# Patient Record
Sex: FEMALE | Race: WHITE | Employment: OTHER | URBAN - METROPOLITAN AREA
[De-identification: names, ages, dates, MRNs, and addresses within clinical notes are randomized per-mention and may not be internally consistent; named-entity substitution may affect disease eponyms.]

---

## 2018-10-04 ENCOUNTER — OFFICE VISIT (OUTPATIENT)
Dept: ORTHOPEDIC SURGERY | Age: 73
End: 2018-10-04
Payer: MEDICARE

## 2018-10-04 VITALS
SYSTOLIC BLOOD PRESSURE: 138 MMHG | HEART RATE: 88 BPM | WEIGHT: 118 LBS | HEIGHT: 61 IN | DIASTOLIC BLOOD PRESSURE: 80 MMHG | BODY MASS INDEX: 22.28 KG/M2

## 2018-10-04 DIAGNOSIS — M25.561 ACUTE PAIN OF RIGHT KNEE: Primary | ICD-10-CM

## 2018-10-04 DIAGNOSIS — M16.11 ARTHRITIS OF RIGHT HIP: ICD-10-CM

## 2018-10-04 PROCEDURE — 1123F ACP DISCUSS/DSCN MKR DOCD: CPT | Performed by: ORTHOPAEDIC SURGERY

## 2018-10-04 PROCEDURE — G8427 DOCREV CUR MEDS BY ELIG CLIN: HCPCS | Performed by: ORTHOPAEDIC SURGERY

## 2018-10-04 PROCEDURE — 4040F PNEUMOC VAC/ADMIN/RCVD: CPT | Performed by: ORTHOPAEDIC SURGERY

## 2018-10-04 PROCEDURE — 3017F COLORECTAL CA SCREEN DOC REV: CPT | Performed by: ORTHOPAEDIC SURGERY

## 2018-10-04 PROCEDURE — 1090F PRES/ABSN URINE INCON ASSESS: CPT | Performed by: ORTHOPAEDIC SURGERY

## 2018-10-04 PROCEDURE — 1036F TOBACCO NON-USER: CPT | Performed by: ORTHOPAEDIC SURGERY

## 2018-10-04 PROCEDURE — G8400 PT W/DXA NO RESULTS DOC: HCPCS | Performed by: ORTHOPAEDIC SURGERY

## 2018-10-04 PROCEDURE — G8420 CALC BMI NORM PARAMETERS: HCPCS | Performed by: ORTHOPAEDIC SURGERY

## 2018-10-04 PROCEDURE — G8484 FLU IMMUNIZE NO ADMIN: HCPCS | Performed by: ORTHOPAEDIC SURGERY

## 2018-10-04 PROCEDURE — 1101F PT FALLS ASSESS-DOCD LE1/YR: CPT | Performed by: ORTHOPAEDIC SURGERY

## 2018-10-04 PROCEDURE — 99203 OFFICE O/P NEW LOW 30 MIN: CPT | Performed by: ORTHOPAEDIC SURGERY

## 2018-10-04 RX ORDER — IBUPROFEN 200 MG
200 TABLET ORAL
COMMUNITY
End: 2018-12-19 | Stop reason: ALTCHOICE

## 2018-10-04 RX ORDER — SIMVASTATIN 20 MG
20 TABLET ORAL NIGHTLY
COMMUNITY
Start: 2018-07-03

## 2018-10-04 RX ORDER — LORATADINE 10 MG/1
10 TABLET ORAL DAILY
Status: ON HOLD | COMMUNITY
End: 2019-01-07

## 2018-10-04 NOTE — PROGRESS NOTES
with walking right shoulder and pelvis or lower limb left    Palpation:  No pain    Gait: Characterized by an antalgic gait with decreased stride length and decreased pelvic rotation with gait    Range of Motion:  diminished range with pain, range of motion of left hip is normal, range of motion right and left knees 0-135 without pain    Kena Test: diminished range with pain    Hip Provacative Maneuvers: Painful with pain felt in the proximal thigh and groin    Allis Test: Unremarkable    Strength:  Normal     Special Tests:  Negative    Neurologic Exam: Normal with intact Sensory and Motor Function     Vascular Exam: Normal with no Venous Stasis, and Intact Pulses Distally      Skin: There are no rashes, ulcerations or lesions. Additional Examinations:         Left Lower Extremity: Examination of the left lower extremity does not show any tenderness, deformity or injury. Range of motion is unremarkable. There is no gross instability. There are no rashes, ulcerations or lesions. Strength and tone are normal.    Radiology:     X-rays obtained and reviewed in office:  Views and AP pelvis x-ray taken elsewhere shows a bone-on-bone right hip anatomy looks fairly normal, x-rays of the knee consisted of a an AP and lateral skive the show no significant arthritic changes on the right knee           Impression: Arthritis right hip otherwise healthy patient, patient had cortisone injection 9/18/2018 it was explained to the patient that surgery will not be performed for at least 3 months after cortisone injection because of an increased risk of infection with subsequent surgery by close to the injection. No diagnosis found. Office Procedures:  No orders of the defined types were placed in this encounter.       Treatment Plan:  We'll plan on a right total hip replacement in January 2019 potassium the patient to come back to see us from 1 December 2018 at that time further plans can be made for getting surgery

## 2018-12-05 ENCOUNTER — OFFICE VISIT (OUTPATIENT)
Dept: ORTHOPEDIC SURGERY | Age: 73
End: 2018-12-05
Payer: MEDICARE

## 2018-12-05 VITALS — HEIGHT: 61 IN | WEIGHT: 117.95 LBS | BODY MASS INDEX: 22.27 KG/M2

## 2018-12-05 DIAGNOSIS — M16.11 ARTHRITIS OF RIGHT HIP: Primary | ICD-10-CM

## 2018-12-05 PROCEDURE — 1090F PRES/ABSN URINE INCON ASSESS: CPT | Performed by: ORTHOPAEDIC SURGERY

## 2018-12-05 PROCEDURE — 99214 OFFICE O/P EST MOD 30 MIN: CPT | Performed by: ORTHOPAEDIC SURGERY

## 2018-12-05 PROCEDURE — 3017F COLORECTAL CA SCREEN DOC REV: CPT | Performed by: ORTHOPAEDIC SURGERY

## 2018-12-05 PROCEDURE — G8400 PT W/DXA NO RESULTS DOC: HCPCS | Performed by: ORTHOPAEDIC SURGERY

## 2018-12-05 PROCEDURE — G8427 DOCREV CUR MEDS BY ELIG CLIN: HCPCS | Performed by: ORTHOPAEDIC SURGERY

## 2018-12-05 PROCEDURE — 1101F PT FALLS ASSESS-DOCD LE1/YR: CPT | Performed by: ORTHOPAEDIC SURGERY

## 2018-12-05 PROCEDURE — 1036F TOBACCO NON-USER: CPT | Performed by: ORTHOPAEDIC SURGERY

## 2018-12-05 PROCEDURE — 1123F ACP DISCUSS/DSCN MKR DOCD: CPT | Performed by: ORTHOPAEDIC SURGERY

## 2018-12-05 PROCEDURE — G8484 FLU IMMUNIZE NO ADMIN: HCPCS | Performed by: ORTHOPAEDIC SURGERY

## 2018-12-05 PROCEDURE — 4040F PNEUMOC VAC/ADMIN/RCVD: CPT | Performed by: ORTHOPAEDIC SURGERY

## 2018-12-05 PROCEDURE — G8420 CALC BMI NORM PARAMETERS: HCPCS | Performed by: ORTHOPAEDIC SURGERY

## 2018-12-05 NOTE — LETTER
[facility]  Surgery Precert & Billing Form:    DEMOGRAPHICS:                                                                                                       Patient Name:  Jessi Tarango  Patient :     Patient SS#:      Patient Phone:  [phone] (home)  Alt.  Patient Phone:    Patient Address:  [address] [place] [id number]    PCP:  [address]: Medicare    DIAGNOSIS & PROCEDURE:                                                                                      Diagnosis: Arthritis of right hip m16.11  Operation: right    SURGERY  INFORMATION  Date of Surgery:   2018  Location:  [address] [place] [address]   Type:    Inpatient  23 hour hold:  No  Surgeon:            Femi Rg MD.       18     BILLING INFORMATION:                                                                                                Physician Procedure                                            CPT Codes                      PA, or Fellow Procedure                                      CPT Codes                      Precert information:

## 2018-12-05 NOTE — LETTER
December 5, 2018      Batool Jacobs,    Breast cancer is the second most common cancer among women in the [place] and results in nearly 40,000 women deaths each year. Early detection, including mammograms, is the key to survival and [facility] would like to remind you to put your health first.     Did you know that breast cancer can hide early on, so by the time you feel a lump during a self-exam, it can be harder to treat? The best thing you can do is get a screening mammogram every 24 months. We are contacting you because our records show that you may need a mammogram.    Most women say the procedure is much easier than they expected, and are glad to have gotten it done. Mammograms are covered by most health insurance programs with little or no cost to you. To schedule an appointment for a mammogram, please click [email or web address] or call your primary care doctors office. If you have already had your mammogram in the past 24 months, great job! Please click [email or web address] to send a [employer] message or call your primary care physicians office with when (month and year) and where your mammogram was completed. This will allow us to obtain a copy of your results and update your medical records. Thanks! Please click <a href=\" [email or web address]">here</a> and contact your doctors office to set up an appointment to discuss your options.       Sincerely,      Your Healthcare Team

## 2018-12-13 DIAGNOSIS — Z01.818 PRE-OP EXAM: Primary | ICD-10-CM

## 2018-12-19 ENCOUNTER — HOSPITAL ENCOUNTER (OUTPATIENT)
Dept: PREADMISSION TESTING | Age: 73
Discharge: HOME | End: 2018-12-23
Payer: MEDICARE

## 2018-12-19 ENCOUNTER — HOSPITAL ENCOUNTER (OUTPATIENT)
Dept: GENERAL RADIOLOGY | Age: 73
Discharge: HOME | End: 2018-12-19
Payer: MEDICARE

## 2018-12-19 VITALS
DIASTOLIC BLOOD PRESSURE: 80 MMHG | HEART RATE: 74 BPM | RESPIRATION RATE: 16 BRPM | TEMPERATURE: 97 F | OXYGEN SATURATION: 100 % | SYSTOLIC BLOOD PRESSURE: 139 MMHG | BODY MASS INDEX: 22.28 KG/M2 | HEIGHT: 61 IN | WEIGHT: 118 LBS

## 2018-12-19 DIAGNOSIS — Z01.818 PREOP TESTING: ICD-10-CM

## 2018-12-19 DIAGNOSIS — Z01.818 PRE-OP EXAM: ICD-10-CM

## 2018-12-19 LAB
BILIRUBIN URINE: NEGATIVE
BLOOD, URINE: NEGATIVE
CLARITY: CLEAR
COLOR: YELLOW
GLUCOSE URINE: NEGATIVE MG/DL
KETONES, URINE: NEGATIVE MG/DL
LEUKOCYTE ESTERASE, URINE: NEGATIVE
MICROSCOPIC EXAMINATION: NORMAL
NITRITE, URINE: NEGATIVE
PH UA: 6.5
PROTEIN UA: NEGATIVE MG/DL
SPECIFIC GRAVITY UA: <=1.005
URINE TYPE: NORMAL
UROBILINOGEN, URINE: 0.2 E.U./DL

## 2018-12-19 PROCEDURE — 87086 URINE CULTURE/COLONY COUNT: CPT

## 2018-12-19 PROCEDURE — 81003 URINALYSIS AUTO W/O SCOPE: CPT

## 2018-12-19 PROCEDURE — 87641 MR-STAPH DNA AMP PROBE: CPT

## 2018-12-19 PROCEDURE — 71046 X-RAY EXAM CHEST 2 VIEWS: CPT

## 2018-12-19 RX ORDER — CALCIUM CARBONATE 500(1250)
500 TABLET ORAL DAILY
COMMUNITY
End: 2020-01-09

## 2018-12-19 RX ORDER — ASCORBIC ACID 500 MG
1000 TABLET ORAL DAILY
COMMUNITY

## 2018-12-19 RX ORDER — ACETAMINOPHEN 500 MG
500 TABLET ORAL EVERY 6 HOURS PRN
Status: ON HOLD | COMMUNITY
End: 2019-01-07

## 2018-12-19 RX ORDER — CHOLECALCIFEROL (VITAMIN D3) 1250 MCG
CAPSULE ORAL SEE ADMIN INSTRUCTIONS
Status: ON HOLD | COMMUNITY
End: 2019-01-07

## 2018-12-19 NOTE — PROGRESS NOTES
[address] [facility]                          Date of Procedure 1/7  Time of Procedure 1130    PRIOR TO PROCEDURE DATE:  1. Please follow any guidelines/instructions prior to your procedure as advised by your surgeon. 2. Arrange for someone to drive you home and be with you for the first 24 hours after discharge for your safety after your procedure for which you received sedation. Ensure it is someone we can share information with regarding your discharge. 3. You must contact your surgeon for instructions IF:   You are taking any blood thinners, aspirin, anti-inflammatory or vitamin E.   There is a change in your physical condition such as a cold, fever, rash, cuts, sores or any other infection, especially near your surgical site. 4. Do not drink alcohol the day before or day of your procedure. 5. A Pre-op History and Physical for surgery MUST be completed by your Physician or Urgent Care within 30 days of your procedure date. Please bring a copy with you on the day of your procedure and along with any other testing performed. THE DAY OF YOUR PROCEDURE:  1. Follow instructions for ARRIVAL TIME as DIRECTED BY YOUR SURGEON. If your surgeon does not give you a specific arrival time, please arrive at  [place] [address] . 2. Enter the MAIN entrance from [address] and follow the signs to the free [employer] or [employer] parking (offered free of charge 6am-5pm). 3. Enter the Main Entrance of the hospital (do NOT enter from the lower level of the parking garage). Upon entrance, check in with the  at the main desk on your left. If no one is available at the desk, proceed into the [facility] Waiting Room and go through the door directly into the [facility]. There is a Check-in desk ACROSS from Room 5 (marked with a sign hanging from the ceiling).  The phone number for the surgery center is [phone].    4. DO NOT EAT ANYTHING eight hours prior effects. 2. For comfort and safety, arrange to have someone at home with you for the first 24 hours after discharge. 3. You and your family will be given written instructions about your diet, activity, dressing care, medications, and return visits. 4. Once at home, should issues with nausea, pain, or bleeding occur, or should you notice any signs of infection, you should call your surgeon. 5. Always clean your hands before and after caring for your wound. Do not let your family touch your surgery site without cleaning their hands. 6. Narcotic pain medications can cause significant constipation. You may want to add a stool softener to your postoperative medication schedule or speak to your surgeon on how best to manage this SIDE EFFECT. SPECIAL INSTRUCTIONS     Thank you for allowing us to care for you. We strive to exceed your expectations in the overall delivery of care and service provided to you and your family. If you need to contact us for any reason, please call us at [phone]. Instructions reviewed and copy given to patient during preadmission testing visit. Pancho Galvan. 12/19/2018 .1:14 PM      ADDITIONAL EDUCATIONAL INFORMATION REVIEWED / PROVIDED TO YOU AND YOUR FAMILY:  Yes Taking Control of Your Pain   Yes FAQs about Surgical Site Infections        Yes Edilberto® Wipes Bathing Instructions (Obtained from:  [email or web address] pdf )  Yes Hibiclens® Bathing Instructions    Yes Incentive Spirometer given to patient- PLEASE BRING THIS SPIROMETER BACK WITH YOU ON THE DAY OF YOUR SURGERY    Yes CMS Comprehensive Care for Joint Replacement Model Notification Letter  Yes Your Guide to Hip Replacement Surgery. PLEASE BRING THIS BOOKLET BACK ON THE DAY OF YOUR SURGERY.     Yes  Reviewed/Given handout for TJ Video/Class

## 2018-12-20 LAB
MRSA SCREEN RT-PCR: NORMAL
URINE CULTURE, ROUTINE: NORMAL

## 2018-12-28 ENCOUNTER — TELEPHONE (OUTPATIENT)
Dept: ORTHOPEDIC SURGERY | Age: 73
End: 2018-12-28

## 2018-12-31 ENCOUNTER — TELEPHONE (OUTPATIENT)
Dept: ORTHOPEDIC SURGERY | Age: 73
End: 2018-12-31

## 2019-01-02 ENCOUNTER — OFFICE VISIT (OUTPATIENT)
Dept: ORTHOPEDIC SURGERY | Age: 74
End: 2019-01-02
Payer: MEDICARE

## 2019-01-02 VITALS — BODY MASS INDEX: 22.27 KG/M2 | WEIGHT: 117.95 LBS | HEIGHT: 61 IN

## 2019-01-02 DIAGNOSIS — M25.551 RIGHT HIP PAIN: Primary | ICD-10-CM

## 2019-01-02 PROCEDURE — G8420 CALC BMI NORM PARAMETERS: HCPCS | Performed by: ORTHOPAEDIC SURGERY

## 2019-01-02 PROCEDURE — 99214 OFFICE O/P EST MOD 30 MIN: CPT | Performed by: ORTHOPAEDIC SURGERY

## 2019-01-02 PROCEDURE — 4040F PNEUMOC VAC/ADMIN/RCVD: CPT | Performed by: ORTHOPAEDIC SURGERY

## 2019-01-02 PROCEDURE — 1036F TOBACCO NON-USER: CPT | Performed by: ORTHOPAEDIC SURGERY

## 2019-01-02 PROCEDURE — G8484 FLU IMMUNIZE NO ADMIN: HCPCS | Performed by: ORTHOPAEDIC SURGERY

## 2019-01-02 PROCEDURE — G8427 DOCREV CUR MEDS BY ELIG CLIN: HCPCS | Performed by: ORTHOPAEDIC SURGERY

## 2019-01-02 PROCEDURE — 1123F ACP DISCUSS/DSCN MKR DOCD: CPT | Performed by: ORTHOPAEDIC SURGERY

## 2019-01-02 PROCEDURE — G8400 PT W/DXA NO RESULTS DOC: HCPCS | Performed by: ORTHOPAEDIC SURGERY

## 2019-01-02 PROCEDURE — 3017F COLORECTAL CA SCREEN DOC REV: CPT | Performed by: ORTHOPAEDIC SURGERY

## 2019-01-02 PROCEDURE — 1101F PT FALLS ASSESS-DOCD LE1/YR: CPT | Performed by: ORTHOPAEDIC SURGERY

## 2019-01-02 PROCEDURE — 1090F PRES/ABSN URINE INCON ASSESS: CPT | Performed by: ORTHOPAEDIC SURGERY

## 2019-01-06 ENCOUNTER — ANESTHESIA EVENT (OUTPATIENT)
Dept: OPERATING ROOM | Age: 74
DRG: 470 | End: 2019-01-06
Payer: MEDICARE

## 2019-01-07 ENCOUNTER — ANESTHESIA (OUTPATIENT)
Dept: OPERATING ROOM | Age: 74
DRG: 470 | End: 2019-01-07
Payer: MEDICARE

## 2019-01-07 ENCOUNTER — APPOINTMENT (OUTPATIENT)
Dept: GENERAL RADIOLOGY | Age: 74
DRG: 470 | End: 2019-01-07
Attending: ORTHOPAEDIC SURGERY
Payer: MEDICARE

## 2019-01-07 ENCOUNTER — HOSPITAL ENCOUNTER (INPATIENT)
Age: 74
LOS: 2 days | Discharge: HOME HEALTH | DRG: 470 | End: 2019-01-09
Attending: ORTHOPAEDIC SURGERY | Admitting: ORTHOPAEDIC SURGERY
Payer: MEDICARE

## 2019-01-07 VITALS — OXYGEN SATURATION: 98 % | TEMPERATURE: 96.1 F | SYSTOLIC BLOOD PRESSURE: 142 MMHG | DIASTOLIC BLOOD PRESSURE: 65 MMHG

## 2019-01-07 DIAGNOSIS — M16.11 OSTEOARTHRITIS OF RIGHT HIP, UNSPECIFIED OSTEOARTHRITIS TYPE: Primary | ICD-10-CM

## 2019-01-07 PROCEDURE — 3600000013 HC SURGERY LEVEL 3 ADDTL 15MIN: Performed by: ORTHOPAEDIC SURGERY

## 2019-01-07 PROCEDURE — 2700000000 HC OXYGEN THERAPY PER DAY

## 2019-01-07 PROCEDURE — 7100000000 HC PACU RECOVERY - FIRST 15 MIN: Performed by: ORTHOPAEDIC SURGERY

## 2019-01-07 PROCEDURE — 2580000003 HC RX 258: Performed by: PHYSICIAN ASSISTANT

## 2019-01-07 PROCEDURE — 2720000010 HC SURG SUPPLY STERILE: Performed by: ORTHOPAEDIC SURGERY

## 2019-01-07 PROCEDURE — 3700000000 HC ANESTHESIA ATTENDED CARE: Performed by: ORTHOPAEDIC SURGERY

## 2019-01-07 PROCEDURE — 2780000010 HC IMPLANT OTHER: Performed by: ORTHOPAEDIC SURGERY

## 2019-01-07 PROCEDURE — 6360000002 HC RX W HCPCS: Performed by: ORTHOPAEDIC SURGERY

## 2019-01-07 PROCEDURE — 3600000003 HC SURGERY LEVEL 3 BASE: Performed by: ORTHOPAEDIC SURGERY

## 2019-01-07 PROCEDURE — 6370000000 HC RX 637 (ALT 250 FOR IP): Performed by: PHYSICIAN ASSISTANT

## 2019-01-07 PROCEDURE — 0SR904A REPLACEMENT OF RIGHT HIP JOINT WITH CERAMIC ON POLYETHYLENE SYNTHETIC SUBSTITUTE, UNCEMENTED, OPEN APPROACH: ICD-10-PCS | Performed by: ORTHOPAEDIC SURGERY

## 2019-01-07 PROCEDURE — 6360000002 HC RX W HCPCS: Performed by: PHYSICIAN ASSISTANT

## 2019-01-07 PROCEDURE — S0028 INJECTION, FAMOTIDINE, 20 MG: HCPCS | Performed by: ANESTHESIOLOGY

## 2019-01-07 PROCEDURE — 88311 DECALCIFY TISSUE: CPT

## 2019-01-07 PROCEDURE — 88304 TISSUE EXAM BY PATHOLOGIST: CPT

## 2019-01-07 PROCEDURE — C1776 JOINT DEVICE (IMPLANTABLE): HCPCS | Performed by: ORTHOPAEDIC SURGERY

## 2019-01-07 PROCEDURE — 3600000005 HC SURGERY LEVEL 5 BASE: Performed by: ORTHOPAEDIC SURGERY

## 2019-01-07 PROCEDURE — 3700000001 HC ADD 15 MINUTES (ANESTHESIA): Performed by: ORTHOPAEDIC SURGERY

## 2019-01-07 PROCEDURE — 2500000003 HC RX 250 WO HCPCS

## 2019-01-07 PROCEDURE — 94761 N-INVAS EAR/PLS OXIMETRY MLT: CPT

## 2019-01-07 PROCEDURE — C1713 ANCHOR/SCREW BN/BN,TIS/BN: HCPCS | Performed by: ORTHOPAEDIC SURGERY

## 2019-01-07 PROCEDURE — 2709999900 HC NON-CHARGEABLE SUPPLY: Performed by: ORTHOPAEDIC SURGERY

## 2019-01-07 PROCEDURE — 3600000015 HC SURGERY LEVEL 5 ADDTL 15MIN: Performed by: ORTHOPAEDIC SURGERY

## 2019-01-07 PROCEDURE — 6360000002 HC RX W HCPCS

## 2019-01-07 PROCEDURE — 6370000000 HC RX 637 (ALT 250 FOR IP): Performed by: ORTHOPAEDIC SURGERY

## 2019-01-07 PROCEDURE — 2580000003 HC RX 258: Performed by: ORTHOPAEDIC SURGERY

## 2019-01-07 PROCEDURE — 7100000001 HC PACU RECOVERY - ADDTL 15 MIN: Performed by: ORTHOPAEDIC SURGERY

## 2019-01-07 PROCEDURE — 2580000003 HC RX 258: Performed by: ANESTHESIOLOGY

## 2019-01-07 PROCEDURE — 6360000002 HC RX W HCPCS: Performed by: ANESTHESIOLOGY

## 2019-01-07 PROCEDURE — 73501 X-RAY EXAM HIP UNI 1 VIEW: CPT

## 2019-01-07 PROCEDURE — 1200000000 HC SEMI PRIVATE

## 2019-01-07 PROCEDURE — 2580000003 HC RX 258

## 2019-01-07 DEVICE — INSERT ACET OD48MM ID28MM X3 MOD 2 MOBILITY MDM: Type: IMPLANTABLE DEVICE | Site: HIP | Status: FUNCTIONAL

## 2019-01-07 DEVICE — SCREW BNE L25MM DIA6.5MM STD CANC HIP TI ST CANN NONLOCKING: Type: IMPLANTABLE DEVICE | Site: HIP | Status: FUNCTIONAL

## 2019-01-07 DEVICE — LINER ACET SZ E ID42MM HIP X3 CO CHROM CEMENTLESS MOD 2: Type: IMPLANTABLE DEVICE | Site: HIP | Status: FUNCTIONAL

## 2019-01-07 DEVICE — IMPLANTABLE DEVICE: Type: IMPLANTABLE DEVICE | Site: HIP | Status: FUNCTIONAL

## 2019-01-07 RX ORDER — ASPIRIN 81 MG/1
162 TABLET ORAL DAILY
Status: DISCONTINUED | OUTPATIENT
Start: 2019-01-07 | End: 2019-01-09 | Stop reason: HOSPADM

## 2019-01-07 RX ORDER — MORPHINE SULFATE 2 MG/ML
2 INJECTION, SOLUTION INTRAMUSCULAR; INTRAVENOUS
Status: DISCONTINUED | OUTPATIENT
Start: 2019-01-07 | End: 2019-01-09 | Stop reason: HOSPADM

## 2019-01-07 RX ORDER — SODIUM CHLORIDE 0.9 % (FLUSH) 0.9 %
10 SYRINGE (ML) INJECTION EVERY 12 HOURS SCHEDULED
Status: DISCONTINUED | OUTPATIENT
Start: 2019-01-07 | End: 2019-01-09 | Stop reason: HOSPADM

## 2019-01-07 RX ORDER — DEXTROSE, SODIUM CHLORIDE, SODIUM LACTATE, POTASSIUM CHLORIDE, AND CALCIUM CHLORIDE 5; .6; .31; .03; .02 G/100ML; G/100ML; G/100ML; G/100ML; G/100ML
INJECTION, SOLUTION INTRAVENOUS CONTINUOUS
Status: DISCONTINUED | OUTPATIENT
Start: 2019-01-07 | End: 2019-01-07

## 2019-01-07 RX ORDER — ONDANSETRON 2 MG/ML
INJECTION INTRAMUSCULAR; INTRAVENOUS PRN
Status: DISCONTINUED | OUTPATIENT
Start: 2019-01-07 | End: 2019-01-07 | Stop reason: SDUPTHER

## 2019-01-07 RX ORDER — DEXTROSE, SODIUM CHLORIDE, SODIUM LACTATE, POTASSIUM CHLORIDE, AND CALCIUM CHLORIDE 5; .6; .31; .03; .02 G/100ML; G/100ML; G/100ML; G/100ML; G/100ML
INJECTION, SOLUTION INTRAVENOUS CONTINUOUS
Status: DISPENSED | OUTPATIENT
Start: 2019-01-07 | End: 2019-01-08

## 2019-01-07 RX ORDER — GABAPENTIN 300 MG/1
600 CAPSULE ORAL ONCE
Status: COMPLETED | OUTPATIENT
Start: 2019-01-07 | End: 2019-01-07

## 2019-01-07 RX ORDER — METOCLOPRAMIDE HYDROCHLORIDE 5 MG/ML
10 INJECTION INTRAMUSCULAR; INTRAVENOUS ONCE
Status: COMPLETED | OUTPATIENT
Start: 2019-01-07 | End: 2019-01-07

## 2019-01-07 RX ORDER — SODIUM CHLORIDE 0.9 % (FLUSH) 0.9 %
10 SYRINGE (ML) INJECTION PRN
Status: DISCONTINUED | OUTPATIENT
Start: 2019-01-07 | End: 2019-01-09 | Stop reason: HOSPADM

## 2019-01-07 RX ORDER — EPHEDRINE SULFATE 50 MG/ML
INJECTION INTRAVENOUS PRN
Status: DISCONTINUED | OUTPATIENT
Start: 2019-01-07 | End: 2019-01-07 | Stop reason: SDUPTHER

## 2019-01-07 RX ORDER — GLYCOPYRROLATE 0.2 MG/ML
INJECTION INTRAMUSCULAR; INTRAVENOUS PRN
Status: DISCONTINUED | OUTPATIENT
Start: 2019-01-07 | End: 2019-01-07 | Stop reason: SDUPTHER

## 2019-01-07 RX ORDER — LIDOCAINE HYDROCHLORIDE 20 MG/ML
INJECTION, SOLUTION EPIDURAL; INFILTRATION; INTRACAUDAL; PERINEURAL PRN
Status: DISCONTINUED | OUTPATIENT
Start: 2019-01-07 | End: 2019-01-07 | Stop reason: SDUPTHER

## 2019-01-07 RX ORDER — SODIUM CHLORIDE 0.9 % (FLUSH) 0.9 %
10 SYRINGE (ML) INJECTION EVERY 12 HOURS SCHEDULED
Status: DISCONTINUED | OUTPATIENT
Start: 2019-01-07 | End: 2019-01-07 | Stop reason: HOSPADM

## 2019-01-07 RX ORDER — ASCORBIC ACID 500 MG
1000 TABLET ORAL DAILY
Status: DISCONTINUED | OUTPATIENT
Start: 2019-01-08 | End: 2019-01-09 | Stop reason: HOSPADM

## 2019-01-07 RX ORDER — HYDROCODONE BITARTRATE AND ACETAMINOPHEN 5; 325 MG/1; MG/1
1 TABLET ORAL EVERY 4 HOURS PRN
Status: DISCONTINUED | OUTPATIENT
Start: 2019-01-07 | End: 2019-01-09 | Stop reason: HOSPADM

## 2019-01-07 RX ORDER — SODIUM CHLORIDE 0.9 % (FLUSH) 0.9 %
10 SYRINGE (ML) INJECTION PRN
Status: DISCONTINUED | OUTPATIENT
Start: 2019-01-07 | End: 2019-01-07 | Stop reason: HOSPADM

## 2019-01-07 RX ORDER — MORPHINE SULFATE 2 MG/ML
1 INJECTION, SOLUTION INTRAMUSCULAR; INTRAVENOUS
Status: DISCONTINUED | OUTPATIENT
Start: 2019-01-07 | End: 2019-01-09 | Stop reason: HOSPADM

## 2019-01-07 RX ORDER — MIDAZOLAM HYDROCHLORIDE 1 MG/ML
INJECTION INTRAMUSCULAR; INTRAVENOUS PRN
Status: DISCONTINUED | OUTPATIENT
Start: 2019-01-07 | End: 2019-01-07 | Stop reason: SDUPTHER

## 2019-01-07 RX ORDER — CELECOXIB 100 MG/1
400 CAPSULE ORAL ONCE
Status: COMPLETED | OUTPATIENT
Start: 2019-01-07 | End: 2019-01-07

## 2019-01-07 RX ORDER — 0.9 % SODIUM CHLORIDE 0.9 %
500 INTRAVENOUS SOLUTION INTRAVENOUS ONCE
Status: COMPLETED | OUTPATIENT
Start: 2019-01-07 | End: 2019-01-07

## 2019-01-07 RX ORDER — ACETAMINOPHEN 325 MG/1
650 TABLET ORAL EVERY 4 HOURS PRN
Status: DISCONTINUED | OUTPATIENT
Start: 2019-01-07 | End: 2019-01-09 | Stop reason: HOSPADM

## 2019-01-07 RX ORDER — ONDANSETRON 2 MG/ML
4 INJECTION INTRAMUSCULAR; INTRAVENOUS EVERY 6 HOURS PRN
Status: DISCONTINUED | OUTPATIENT
Start: 2019-01-07 | End: 2019-01-09 | Stop reason: HOSPADM

## 2019-01-07 RX ORDER — SIMVASTATIN 20 MG
20 TABLET ORAL NIGHTLY
Status: DISCONTINUED | OUTPATIENT
Start: 2019-01-07 | End: 2019-01-09 | Stop reason: HOSPADM

## 2019-01-07 RX ORDER — CALCIUM CARBONATE 500(1250)
500 TABLET ORAL DAILY
Status: DISCONTINUED | OUTPATIENT
Start: 2019-01-08 | End: 2019-01-09 | Stop reason: HOSPADM

## 2019-01-07 RX ORDER — DEXAMETHASONE SODIUM PHOSPHATE 4 MG/ML
INJECTION, SOLUTION INTRA-ARTICULAR; INTRALESIONAL; INTRAMUSCULAR; INTRAVENOUS; SOFT TISSUE PRN
Status: DISCONTINUED | OUTPATIENT
Start: 2019-01-07 | End: 2019-01-07 | Stop reason: SDUPTHER

## 2019-01-07 RX ORDER — LABETALOL HYDROCHLORIDE 5 MG/ML
5 INJECTION, SOLUTION INTRAVENOUS EVERY 10 MIN PRN
Status: DISCONTINUED | OUTPATIENT
Start: 2019-01-07 | End: 2019-01-07 | Stop reason: HOSPADM

## 2019-01-07 RX ORDER — CEFAZOLIN SODIUM 2 G/50ML
2 SOLUTION INTRAVENOUS
Status: COMPLETED | OUTPATIENT
Start: 2019-01-07 | End: 2019-01-07

## 2019-01-07 RX ORDER — SODIUM CHLORIDE, SODIUM LACTATE, POTASSIUM CHLORIDE, CALCIUM CHLORIDE 600; 310; 30; 20 MG/100ML; MG/100ML; MG/100ML; MG/100ML
INJECTION, SOLUTION INTRAVENOUS CONTINUOUS
Status: DISCONTINUED | OUTPATIENT
Start: 2019-01-07 | End: 2019-01-07

## 2019-01-07 RX ORDER — ROCURONIUM BROMIDE 10 MG/ML
INJECTION, SOLUTION INTRAVENOUS PRN
Status: DISCONTINUED | OUTPATIENT
Start: 2019-01-07 | End: 2019-01-07 | Stop reason: SDUPTHER

## 2019-01-07 RX ORDER — SODIUM CHLORIDE, SODIUM LACTATE, POTASSIUM CHLORIDE, CALCIUM CHLORIDE 600; 310; 30; 20 MG/100ML; MG/100ML; MG/100ML; MG/100ML
INJECTION, SOLUTION INTRAVENOUS CONTINUOUS PRN
Status: DISCONTINUED | OUTPATIENT
Start: 2019-01-07 | End: 2019-01-07 | Stop reason: SDUPTHER

## 2019-01-07 RX ORDER — HYDRALAZINE HYDROCHLORIDE 20 MG/ML
5 INJECTION INTRAMUSCULAR; INTRAVENOUS EVERY 10 MIN PRN
Status: DISCONTINUED | OUTPATIENT
Start: 2019-01-07 | End: 2019-01-07 | Stop reason: HOSPADM

## 2019-01-07 RX ORDER — ONDANSETRON 2 MG/ML
4 INJECTION INTRAMUSCULAR; INTRAVENOUS
Status: DISCONTINUED | OUTPATIENT
Start: 2019-01-07 | End: 2019-01-07 | Stop reason: HOSPADM

## 2019-01-07 RX ORDER — LIDOCAINE HYDROCHLORIDE 10 MG/ML
1 INJECTION, SOLUTION EPIDURAL; INFILTRATION; INTRACAUDAL; PERINEURAL
Status: DISCONTINUED | OUTPATIENT
Start: 2019-01-07 | End: 2019-01-07 | Stop reason: HOSPADM

## 2019-01-07 RX ORDER — FENTANYL CITRATE 50 UG/ML
INJECTION, SOLUTION INTRAMUSCULAR; INTRAVENOUS PRN
Status: DISCONTINUED | OUTPATIENT
Start: 2019-01-07 | End: 2019-01-07 | Stop reason: SDUPTHER

## 2019-01-07 RX ORDER — DOCUSATE SODIUM 100 MG/1
100 CAPSULE, LIQUID FILLED ORAL 2 TIMES DAILY
Status: DISCONTINUED | OUTPATIENT
Start: 2019-01-07 | End: 2019-01-09 | Stop reason: HOSPADM

## 2019-01-07 RX ORDER — PROPOFOL 10 MG/ML
INJECTION, EMULSION INTRAVENOUS PRN
Status: DISCONTINUED | OUTPATIENT
Start: 2019-01-07 | End: 2019-01-07 | Stop reason: SDUPTHER

## 2019-01-07 RX ADMIN — HYDROMORPHONE HYDROCHLORIDE 0.5 MG: 1 INJECTION, SOLUTION INTRAMUSCULAR; INTRAVENOUS; SUBCUTANEOUS at 13:23

## 2019-01-07 RX ADMIN — CEFAZOLIN SODIUM 1 G: 1 INJECTION, POWDER, FOR SOLUTION INTRAMUSCULAR; INTRAVENOUS at 18:20

## 2019-01-07 RX ADMIN — SODIUM CHLORIDE, SODIUM LACTATE, POTASSIUM CHLORIDE, AND CALCIUM CHLORIDE: 600; 310; 30; 20 INJECTION, SOLUTION INTRAVENOUS at 09:50

## 2019-01-07 RX ADMIN — CELECOXIB 400 MG: 100 CAPSULE ORAL at 09:25

## 2019-01-07 RX ADMIN — ROCURONIUM BROMIDE 30 MG: 10 INJECTION, SOLUTION INTRAVENOUS at 11:51

## 2019-01-07 RX ADMIN — ROCURONIUM BROMIDE 10 MG: 10 INJECTION, SOLUTION INTRAVENOUS at 11:00

## 2019-01-07 RX ADMIN — SODIUM CHLORIDE, SODIUM LACTATE, POTASSIUM CHLORIDE, AND CALCIUM CHLORIDE: 600; 310; 30; 20 INJECTION, SOLUTION INTRAVENOUS at 10:25

## 2019-01-07 RX ADMIN — LIDOCAINE HYDROCHLORIDE 100 MG: 20 INJECTION, SOLUTION EPIDURAL; INFILTRATION; INTRACAUDAL; PERINEURAL at 10:30

## 2019-01-07 RX ADMIN — EPHEDRINE SULFATE 5 MG: 50 INJECTION INTRAVENOUS at 12:00

## 2019-01-07 RX ADMIN — FENTANYL CITRATE 50 MCG: 50 INJECTION INTRAMUSCULAR; INTRAVENOUS at 10:35

## 2019-01-07 RX ADMIN — SODIUM CHLORIDE, SODIUM LACTATE, POTASSIUM CHLORIDE, AND CALCIUM CHLORIDE: 600; 310; 30; 20 INJECTION, SOLUTION INTRAVENOUS at 12:00

## 2019-01-07 RX ADMIN — ROCURONIUM BROMIDE 40 MG: 10 INJECTION, SOLUTION INTRAVENOUS at 10:30

## 2019-01-07 RX ADMIN — PROPOFOL 160 MG: 10 INJECTION, EMULSION INTRAVENOUS at 10:30

## 2019-01-07 RX ADMIN — CEFAZOLIN SODIUM 2 G: 2 SOLUTION INTRAVENOUS at 10:05

## 2019-01-07 RX ADMIN — GLYCOPYRROLATE 0.2 MG: 0.2 INJECTION INTRAMUSCULAR; INTRAVENOUS at 10:45

## 2019-01-07 RX ADMIN — ONDANSETRON 4 MG: 2 INJECTION INTRAMUSCULAR; INTRAVENOUS at 18:19

## 2019-01-07 RX ADMIN — HYDROMORPHONE HYDROCHLORIDE 0.5 MG: 1 INJECTION, SOLUTION INTRAMUSCULAR; INTRAVENOUS; SUBCUTANEOUS at 16:08

## 2019-01-07 RX ADMIN — EPHEDRINE SULFATE 10 MG: 50 INJECTION INTRAVENOUS at 12:15

## 2019-01-07 RX ADMIN — DEXAMETHASONE SODIUM PHOSPHATE 4 MG: 4 INJECTION, SOLUTION INTRAMUSCULAR; INTRAVENOUS at 10:45

## 2019-01-07 RX ADMIN — HYDROMORPHONE HYDROCHLORIDE 0.5 MG: 1 INJECTION, SOLUTION INTRAMUSCULAR; INTRAVENOUS; SUBCUTANEOUS at 13:18

## 2019-01-07 RX ADMIN — FENTANYL CITRATE 50 MCG: 50 INJECTION INTRAMUSCULAR; INTRAVENOUS at 10:10

## 2019-01-07 RX ADMIN — SODIUM CHLORIDE, SODIUM LACTATE, POTASSIUM CHLORIDE, CALCIUM CHLORIDE AND DEXTROSE MONOHYDRATE: 5; 600; 310; 30; 20 INJECTION, SOLUTION INTRAVENOUS at 20:47

## 2019-01-07 RX ADMIN — DOCUSATE SODIUM 100 MG: 100 CAPSULE, LIQUID FILLED ORAL at 20:09

## 2019-01-07 RX ADMIN — MIDAZOLAM HYDROCHLORIDE 2 MG: 1 INJECTION INTRAMUSCULAR; INTRAVENOUS at 10:05

## 2019-01-07 RX ADMIN — ASPIRIN 162 MG: 81 TABLET, COATED ORAL at 18:20

## 2019-01-07 RX ADMIN — HYDROMORPHONE HYDROCHLORIDE 0.5 MG: 1 INJECTION, SOLUTION INTRAMUSCULAR; INTRAVENOUS; SUBCUTANEOUS at 16:18

## 2019-01-07 RX ADMIN — METOCLOPRAMIDE 10 MG: 5 INJECTION, SOLUTION INTRAMUSCULAR; INTRAVENOUS at 09:35

## 2019-01-07 RX ADMIN — SODIUM CHLORIDE 500 ML: 9 INJECTION, SOLUTION INTRAVENOUS at 09:10

## 2019-01-07 RX ADMIN — SUGAMMADEX 200 MG: 100 INJECTION, SOLUTION INTRAVENOUS at 12:20

## 2019-01-07 RX ADMIN — SIMVASTATIN 20 MG: 20 TABLET, FILM COATED ORAL at 20:09

## 2019-01-07 RX ADMIN — EPHEDRINE SULFATE 5 MG: 50 INJECTION INTRAVENOUS at 12:02

## 2019-01-07 RX ADMIN — FAMOTIDINE 20 MG: 10 INJECTION, SOLUTION INTRAVENOUS at 09:35

## 2019-01-07 RX ADMIN — ONDANSETRON 4 MG: 2 INJECTION INTRAMUSCULAR; INTRAVENOUS at 10:45

## 2019-01-07 RX ADMIN — HYDROCODONE BITARTRATE AND ACETAMINOPHEN 1 TABLET: 5; 325 TABLET ORAL at 20:09

## 2019-01-07 RX ADMIN — GABAPENTIN 600 MG: 300 CAPSULE ORAL at 09:35

## 2019-01-07 ASSESSMENT — PULMONARY FUNCTION TESTS
PIF_VALUE: 18
PIF_VALUE: 19
PIF_VALUE: 1
PIF_VALUE: 19
PIF_VALUE: 1
PIF_VALUE: 1
PIF_VALUE: 19
PIF_VALUE: 18
PIF_VALUE: 23
PIF_VALUE: 1
PIF_VALUE: 18
PIF_VALUE: 19
PIF_VALUE: 19
PIF_VALUE: 15
PIF_VALUE: 1
PIF_VALUE: 15
PIF_VALUE: 18
PIF_VALUE: 19
PIF_VALUE: 19
PIF_VALUE: 18
PIF_VALUE: 17
PIF_VALUE: 19
PIF_VALUE: 18
PIF_VALUE: 20
PIF_VALUE: 1
PIF_VALUE: 18
PIF_VALUE: 17
PIF_VALUE: 19
PIF_VALUE: 19
PIF_VALUE: 16
PIF_VALUE: 18
PIF_VALUE: 17
PIF_VALUE: 1
PIF_VALUE: 18
PIF_VALUE: 19
PIF_VALUE: 1
PIF_VALUE: 19
PIF_VALUE: 17
PIF_VALUE: 15
PIF_VALUE: 20
PIF_VALUE: 19
PIF_VALUE: 18
PIF_VALUE: 20
PIF_VALUE: 17
PIF_VALUE: 19
PIF_VALUE: 1
PIF_VALUE: 18
PIF_VALUE: 2
PIF_VALUE: 18
PIF_VALUE: 2
PIF_VALUE: 19
PIF_VALUE: 18
PIF_VALUE: 19
PIF_VALUE: 18
PIF_VALUE: 1
PIF_VALUE: 18
PIF_VALUE: 19
PIF_VALUE: 18
PIF_VALUE: 15
PIF_VALUE: 19
PIF_VALUE: 18
PIF_VALUE: 19
PIF_VALUE: 19
PIF_VALUE: 17
PIF_VALUE: 19
PIF_VALUE: 18
PIF_VALUE: 15
PIF_VALUE: 19
PIF_VALUE: 17
PIF_VALUE: 6
PIF_VALUE: 1
PIF_VALUE: 20
PIF_VALUE: 19
PIF_VALUE: 4
PIF_VALUE: 19
PIF_VALUE: 1
PIF_VALUE: 19
PIF_VALUE: 18
PIF_VALUE: 19
PIF_VALUE: 18
PIF_VALUE: 1
PIF_VALUE: 15
PIF_VALUE: 1
PIF_VALUE: 19
PIF_VALUE: 1
PIF_VALUE: 19
PIF_VALUE: 2
PIF_VALUE: 19
PIF_VALUE: 16
PIF_VALUE: 20
PIF_VALUE: 19
PIF_VALUE: 19
PIF_VALUE: 18
PIF_VALUE: 19
PIF_VALUE: 19
PIF_VALUE: 18
PIF_VALUE: 15
PIF_VALUE: 20
PIF_VALUE: 19
PIF_VALUE: 1
PIF_VALUE: 15
PIF_VALUE: 20
PIF_VALUE: 1
PIF_VALUE: 1
PIF_VALUE: 18
PIF_VALUE: 19
PIF_VALUE: 19
PIF_VALUE: 20
PIF_VALUE: 18
PIF_VALUE: 1
PIF_VALUE: 17
PIF_VALUE: 1
PIF_VALUE: 15
PIF_VALUE: 19
PIF_VALUE: 1
PIF_VALUE: 15
PIF_VALUE: 20
PIF_VALUE: 18
PIF_VALUE: 18
PIF_VALUE: 1
PIF_VALUE: 1
PIF_VALUE: 19
PIF_VALUE: 1
PIF_VALUE: 18
PIF_VALUE: 18
PIF_VALUE: 1
PIF_VALUE: 2
PIF_VALUE: 19
PIF_VALUE: 18
PIF_VALUE: 18
PIF_VALUE: 4
PIF_VALUE: 19
PIF_VALUE: 15
PIF_VALUE: 1
PIF_VALUE: 15
PIF_VALUE: 18
PIF_VALUE: 19
PIF_VALUE: 1
PIF_VALUE: 18
PIF_VALUE: 19

## 2019-01-07 ASSESSMENT — PAIN SCALES - GENERAL
PAINLEVEL_OUTOF10: 7
PAINLEVEL_OUTOF10: 4
PAINLEVEL_OUTOF10: 7
PAINLEVEL_OUTOF10: 7
PAINLEVEL_OUTOF10: 4
PAINLEVEL_OUTOF10: 7
PAINLEVEL_OUTOF10: 5
PAINLEVEL_OUTOF10: 7

## 2019-01-07 ASSESSMENT — PAIN DESCRIPTION - DESCRIPTORS
DESCRIPTORS: DISCOMFORT
DESCRIPTORS: ACHING
DESCRIPTORS: DISCOMFORT

## 2019-01-07 ASSESSMENT — PAIN DESCRIPTION - LOCATION
LOCATION: HIP

## 2019-01-07 ASSESSMENT — PAIN DESCRIPTION - PROGRESSION
CLINICAL_PROGRESSION: GRADUALLY WORSENING
CLINICAL_PROGRESSION: GRADUALLY WORSENING

## 2019-01-07 ASSESSMENT — PAIN DESCRIPTION - FREQUENCY
FREQUENCY: CONTINUOUS

## 2019-01-07 ASSESSMENT — PAIN DESCRIPTION - ONSET
ONSET: ON-GOING
ONSET: GRADUAL

## 2019-01-07 ASSESSMENT — PAIN DESCRIPTION - ORIENTATION
ORIENTATION: RIGHT

## 2019-01-07 ASSESSMENT — PAIN DESCRIPTION - PAIN TYPE
TYPE: SURGICAL PAIN

## 2019-01-07 ASSESSMENT — PAIN - FUNCTIONAL ASSESSMENT: PAIN_FUNCTIONAL_ASSESSMENT: 0-10

## 2019-01-08 LAB
ANION GAP SERPL CALCULATED.3IONS-SCNC: 7 MMOL/L (ref 3–16)
BUN BLDV-MCNC: 10 MG/DL (ref 7–20)
CALCIUM SERPL-MCNC: 9 MG/DL (ref 8.3–10.6)
CHLORIDE BLD-SCNC: 101 MMOL/L (ref 99–110)
CO2: 28 MMOL/L (ref 21–32)
CREAT SERPL-MCNC: <0.5 MG/DL (ref 0.6–1.2)
GFR AFRICAN AMERICAN: >60
GFR NON-AFRICAN AMERICAN: >60
GLUCOSE BLD-MCNC: 135 MG/DL (ref 70–99)
HCT VFR BLD CALC: 31 % (ref 36–48)
HEMOGLOBIN: 10.2 G/DL (ref 12–16)
POTASSIUM SERPL-SCNC: 4.9 MMOL/L (ref 3.5–5.1)
SODIUM BLD-SCNC: 136 MMOL/L (ref 136–145)

## 2019-01-08 PROCEDURE — 2580000003 HC RX 258: Performed by: PHYSICIAN ASSISTANT

## 2019-01-08 PROCEDURE — 1200000000 HC SEMI PRIVATE

## 2019-01-08 PROCEDURE — 97110 THERAPEUTIC EXERCISES: CPT

## 2019-01-08 PROCEDURE — 97161 PT EVAL LOW COMPLEX 20 MIN: CPT

## 2019-01-08 PROCEDURE — 97166 OT EVAL MOD COMPLEX 45 MIN: CPT

## 2019-01-08 PROCEDURE — 85014 HEMATOCRIT: CPT

## 2019-01-08 PROCEDURE — 6360000002 HC RX W HCPCS: Performed by: PHYSICIAN ASSISTANT

## 2019-01-08 PROCEDURE — 36415 COLL VENOUS BLD VENIPUNCTURE: CPT

## 2019-01-08 PROCEDURE — G8978 MOBILITY CURRENT STATUS: HCPCS

## 2019-01-08 PROCEDURE — 97535 SELF CARE MNGMENT TRAINING: CPT

## 2019-01-08 PROCEDURE — 85018 HEMOGLOBIN: CPT

## 2019-01-08 PROCEDURE — 97116 GAIT TRAINING THERAPY: CPT

## 2019-01-08 PROCEDURE — 97530 THERAPEUTIC ACTIVITIES: CPT

## 2019-01-08 PROCEDURE — 80048 BASIC METABOLIC PNL TOTAL CA: CPT

## 2019-01-08 PROCEDURE — 6370000000 HC RX 637 (ALT 250 FOR IP): Performed by: PHYSICIAN ASSISTANT

## 2019-01-08 PROCEDURE — G8987 SELF CARE CURRENT STATUS: HCPCS

## 2019-01-08 PROCEDURE — G8988 SELF CARE GOAL STATUS: HCPCS

## 2019-01-08 PROCEDURE — G8979 MOBILITY GOAL STATUS: HCPCS

## 2019-01-08 RX ORDER — CELECOXIB 100 MG/1
200 CAPSULE ORAL 2 TIMES DAILY
Status: DISCONTINUED | OUTPATIENT
Start: 2019-01-08 | End: 2019-01-09 | Stop reason: HOSPADM

## 2019-01-08 RX ORDER — GABAPENTIN 100 MG/1
100 CAPSULE ORAL 3 TIMES DAILY
Status: DISCONTINUED | OUTPATIENT
Start: 2019-01-08 | End: 2019-01-09 | Stop reason: HOSPADM

## 2019-01-08 RX ADMIN — GABAPENTIN 100 MG: 100 CAPSULE ORAL at 15:02

## 2019-01-08 RX ADMIN — MORPHINE SULFATE 2 MG: 2 INJECTION, SOLUTION INTRAMUSCULAR; INTRAVENOUS at 00:24

## 2019-01-08 RX ADMIN — CALCIUM 500 MG: 500 TABLET ORAL at 09:00

## 2019-01-08 RX ADMIN — ONDANSETRON 4 MG: 2 INJECTION INTRAMUSCULAR; INTRAVENOUS at 10:22

## 2019-01-08 RX ADMIN — HYDROCODONE BITARTRATE AND ACETAMINOPHEN 1 TABLET: 5; 325 TABLET ORAL at 15:03

## 2019-01-08 RX ADMIN — ASPIRIN 162 MG: 81 TABLET, COATED ORAL at 09:00

## 2019-01-08 RX ADMIN — CELECOXIB 200 MG: 100 CAPSULE ORAL at 10:23

## 2019-01-08 RX ADMIN — DOCUSATE SODIUM 100 MG: 100 CAPSULE, LIQUID FILLED ORAL at 20:35

## 2019-01-08 RX ADMIN — MORPHINE SULFATE 2 MG: 2 INJECTION, SOLUTION INTRAMUSCULAR; INTRAVENOUS at 04:01

## 2019-01-08 RX ADMIN — OXYCODONE HYDROCHLORIDE AND ACETAMINOPHEN 1000 MG: 500 TABLET ORAL at 09:00

## 2019-01-08 RX ADMIN — GABAPENTIN 100 MG: 100 CAPSULE ORAL at 11:06

## 2019-01-08 RX ADMIN — GABAPENTIN 100 MG: 100 CAPSULE ORAL at 20:35

## 2019-01-08 RX ADMIN — Medication 10 ML: at 20:37

## 2019-01-08 RX ADMIN — HYDROCODONE BITARTRATE AND ACETAMINOPHEN 1 TABLET: 5; 325 TABLET ORAL at 19:33

## 2019-01-08 RX ADMIN — HYDROCODONE BITARTRATE AND ACETAMINOPHEN 1 TABLET: 5; 325 TABLET ORAL at 02:59

## 2019-01-08 RX ADMIN — CELECOXIB 200 MG: 100 CAPSULE ORAL at 20:35

## 2019-01-08 RX ADMIN — CEFAZOLIN SODIUM 1 G: 1 INJECTION, POWDER, FOR SOLUTION INTRAMUSCULAR; INTRAVENOUS at 02:54

## 2019-01-08 RX ADMIN — HYDROCODONE BITARTRATE AND ACETAMINOPHEN 1 TABLET: 5; 325 TABLET ORAL at 11:07

## 2019-01-08 RX ADMIN — DOCUSATE SODIUM 100 MG: 100 CAPSULE, LIQUID FILLED ORAL at 09:00

## 2019-01-08 RX ADMIN — HYDROCODONE BITARTRATE AND ACETAMINOPHEN 1 TABLET: 5; 325 TABLET ORAL at 07:06

## 2019-01-08 RX ADMIN — SIMVASTATIN 20 MG: 20 TABLET, FILM COATED ORAL at 20:35

## 2019-01-08 ASSESSMENT — PAIN SCALES - GENERAL
PAINLEVEL_OUTOF10: 7
PAINLEVEL_OUTOF10: 8
PAINLEVEL_OUTOF10: 4
PAINLEVEL_OUTOF10: 7
PAINLEVEL_OUTOF10: 4
PAINLEVEL_OUTOF10: 9
PAINLEVEL_OUTOF10: 8
PAINLEVEL_OUTOF10: 7
PAINLEVEL_OUTOF10: 6

## 2019-01-08 ASSESSMENT — PAIN DESCRIPTION - LOCATION: LOCATION: HIP

## 2019-01-08 ASSESSMENT — PAIN DESCRIPTION - ORIENTATION: ORIENTATION: RIGHT

## 2019-01-08 ASSESSMENT — PAIN DESCRIPTION - PAIN TYPE: TYPE: SURGICAL PAIN

## 2019-01-08 ASSESSMENT — PAIN DESCRIPTION - FREQUENCY: FREQUENCY: CONTINUOUS

## 2019-01-08 ASSESSMENT — PAIN DESCRIPTION - DESCRIPTORS: DESCRIPTORS: ACHING

## 2019-01-09 ENCOUNTER — TELEPHONE (OUTPATIENT)
Dept: ORTHOPEDIC SURGERY | Age: 74
End: 2019-01-09

## 2019-01-09 VITALS
SYSTOLIC BLOOD PRESSURE: 113 MMHG | DIASTOLIC BLOOD PRESSURE: 65 MMHG | TEMPERATURE: 98.6 F | OXYGEN SATURATION: 97 % | HEIGHT: 61 IN | HEART RATE: 97 BPM | RESPIRATION RATE: 16 BRPM | BODY MASS INDEX: 21.34 KG/M2 | WEIGHT: 113 LBS

## 2019-01-09 LAB
ANION GAP SERPL CALCULATED.3IONS-SCNC: 8 MMOL/L (ref 3–16)
BUN BLDV-MCNC: 10 MG/DL (ref 7–20)
CALCIUM SERPL-MCNC: 9 MG/DL (ref 8.3–10.6)
CHLORIDE BLD-SCNC: 100 MMOL/L (ref 99–110)
CO2: 28 MMOL/L (ref 21–32)
CREAT SERPL-MCNC: <0.5 MG/DL (ref 0.6–1.2)
GFR AFRICAN AMERICAN: >60
GFR NON-AFRICAN AMERICAN: >60
GLUCOSE BLD-MCNC: 96 MG/DL (ref 70–99)
HCT VFR BLD CALC: 30.3 % (ref 36–48)
HEMOGLOBIN: 10.2 G/DL (ref 12–16)
POTASSIUM SERPL-SCNC: 4.1 MMOL/L (ref 3.5–5.1)
SODIUM BLD-SCNC: 136 MMOL/L (ref 136–145)

## 2019-01-09 PROCEDURE — 97110 THERAPEUTIC EXERCISES: CPT

## 2019-01-09 PROCEDURE — 85014 HEMATOCRIT: CPT

## 2019-01-09 PROCEDURE — 6370000000 HC RX 637 (ALT 250 FOR IP): Performed by: PHYSICIAN ASSISTANT

## 2019-01-09 PROCEDURE — 97116 GAIT TRAINING THERAPY: CPT

## 2019-01-09 PROCEDURE — 80048 BASIC METABOLIC PNL TOTAL CA: CPT

## 2019-01-09 PROCEDURE — 36415 COLL VENOUS BLD VENIPUNCTURE: CPT

## 2019-01-09 PROCEDURE — 85018 HEMOGLOBIN: CPT

## 2019-01-09 RX ORDER — HYDROCODONE BITARTRATE AND ACETAMINOPHEN 5; 325 MG/1; MG/1
1 TABLET ORAL EVERY 6 HOURS PRN
Qty: 28 TABLET | Refills: 0 | Status: SHIPPED | OUTPATIENT
Start: 2019-01-09 | End: 2019-01-14 | Stop reason: SDUPTHER

## 2019-01-09 RX ADMIN — ASPIRIN 162 MG: 81 TABLET, COATED ORAL at 08:08

## 2019-01-09 RX ADMIN — HYDROCODONE BITARTRATE AND ACETAMINOPHEN 1 TABLET: 5; 325 TABLET ORAL at 06:41

## 2019-01-09 RX ADMIN — HYDROCODONE BITARTRATE AND ACETAMINOPHEN 1 TABLET: 5; 325 TABLET ORAL at 10:33

## 2019-01-09 RX ADMIN — CELECOXIB 200 MG: 100 CAPSULE ORAL at 08:07

## 2019-01-09 RX ADMIN — GABAPENTIN 100 MG: 100 CAPSULE ORAL at 08:08

## 2019-01-09 RX ADMIN — DOCUSATE SODIUM 100 MG: 100 CAPSULE, LIQUID FILLED ORAL at 08:07

## 2019-01-09 RX ADMIN — HYDROCODONE BITARTRATE AND ACETAMINOPHEN 1 TABLET: 5; 325 TABLET ORAL at 02:01

## 2019-01-09 RX ADMIN — OXYCODONE HYDROCHLORIDE AND ACETAMINOPHEN 1000 MG: 500 TABLET ORAL at 08:07

## 2019-01-09 RX ADMIN — CALCIUM 500 MG: 500 TABLET ORAL at 08:07

## 2019-01-09 ASSESSMENT — PAIN SCALES - GENERAL
PAINLEVEL_OUTOF10: 5
PAINLEVEL_OUTOF10: 5
PAINLEVEL_OUTOF10: 3
PAINLEVEL_OUTOF10: 4

## 2019-01-10 ENCOUNTER — CARE COORDINATION (OUTPATIENT)
Dept: CASE MANAGEMENT | Age: 74
End: 2019-01-10

## 2019-01-11 ENCOUNTER — TELEPHONE (OUTPATIENT)
Dept: ORTHOPEDIC SURGERY | Age: 74
End: 2019-01-11

## 2019-01-11 DIAGNOSIS — M16.11 OSTEOARTHRITIS OF RIGHT HIP, UNSPECIFIED OSTEOARTHRITIS TYPE: ICD-10-CM

## 2019-01-14 ENCOUNTER — TELEPHONE (OUTPATIENT)
Dept: ORTHOPEDIC SURGERY | Age: 74
End: 2019-01-14

## 2019-01-14 DIAGNOSIS — M16.11 OSTEOARTHRITIS OF RIGHT HIP, UNSPECIFIED OSTEOARTHRITIS TYPE: Primary | ICD-10-CM

## 2019-01-14 RX ORDER — HYDROCODONE BITARTRATE AND ACETAMINOPHEN 5; 325 MG/1; MG/1
1 TABLET ORAL EVERY 6 HOURS PRN
Qty: 28 TABLET | Refills: 0 | Status: SHIPPED | OUTPATIENT
Start: 2019-01-14 | End: 2019-01-21

## 2019-01-17 ENCOUNTER — OFFICE VISIT (OUTPATIENT)
Dept: ORTHOPEDIC SURGERY | Age: 74
End: 2019-01-17

## 2019-01-17 VITALS — WEIGHT: 113.1 LBS | BODY MASS INDEX: 21.35 KG/M2 | HEIGHT: 61 IN

## 2019-01-17 DIAGNOSIS — Z96.641 STATUS POST TOTAL HIP REPLACEMENT, RIGHT: Primary | ICD-10-CM

## 2019-01-17 PROCEDURE — 99024 POSTOP FOLLOW-UP VISIT: CPT | Performed by: ORTHOPAEDIC SURGERY

## 2019-01-21 ENCOUNTER — HOSPITAL ENCOUNTER (OUTPATIENT)
Dept: PHYSICAL THERAPY | Age: 74
Setting detail: THERAPIES SERIES
Discharge: HOME | End: 2019-01-21
Payer: MEDICARE

## 2019-01-21 PROCEDURE — 97161 PT EVAL LOW COMPLEX 20 MIN: CPT | Performed by: PHYSICAL THERAPIST

## 2019-01-21 PROCEDURE — G8978 MOBILITY CURRENT STATUS: HCPCS | Performed by: PHYSICAL THERAPIST

## 2019-01-21 PROCEDURE — G8979 MOBILITY GOAL STATUS: HCPCS | Performed by: PHYSICAL THERAPIST

## 2019-01-21 PROCEDURE — 97110 THERAPEUTIC EXERCISES: CPT | Performed by: PHYSICAL THERAPIST

## 2019-01-22 ENCOUNTER — TELEPHONE (OUTPATIENT)
Dept: ORTHOPEDIC SURGERY | Age: 74
End: 2019-01-22

## 2019-01-22 ENCOUNTER — CARE COORDINATION (OUTPATIENT)
Dept: CASE MANAGEMENT | Age: 74
End: 2019-01-22

## 2019-01-22 DIAGNOSIS — Z96.641 S/P HIP REPLACEMENT, RIGHT: ICD-10-CM

## 2019-01-22 DIAGNOSIS — M16.11 OSTEOARTHRITIS OF RIGHT HIP, UNSPECIFIED OSTEOARTHRITIS TYPE: Primary | ICD-10-CM

## 2019-01-23 ENCOUNTER — HOSPITAL ENCOUNTER (OUTPATIENT)
Dept: PHYSICAL THERAPY | Age: 74
Setting detail: THERAPIES SERIES
Discharge: HOME | End: 2019-01-23
Payer: MEDICARE

## 2019-01-23 PROCEDURE — 97112 NEUROMUSCULAR REEDUCATION: CPT | Performed by: PHYSICAL THERAPIST

## 2019-01-23 PROCEDURE — 97110 THERAPEUTIC EXERCISES: CPT | Performed by: PHYSICAL THERAPIST

## 2019-01-24 RX ORDER — HYDROCODONE BITARTRATE AND ACETAMINOPHEN 5; 325 MG/1; MG/1
1 TABLET ORAL EVERY 6 HOURS PRN
Qty: 28 TABLET | Refills: 0 | OUTPATIENT
Start: 2019-01-24 | End: 2019-01-31

## 2019-01-25 ENCOUNTER — TELEPHONE (OUTPATIENT)
Dept: ORTHOPEDIC SURGERY | Age: 74
End: 2019-01-25

## 2019-01-29 ENCOUNTER — HOSPITAL ENCOUNTER (OUTPATIENT)
Dept: PHYSICAL THERAPY | Age: 74
Setting detail: THERAPIES SERIES
Discharge: HOME | End: 2019-01-29
Payer: MEDICARE

## 2019-01-29 PROCEDURE — 97112 NEUROMUSCULAR REEDUCATION: CPT | Performed by: PHYSICAL THERAPIST

## 2019-01-29 PROCEDURE — 97110 THERAPEUTIC EXERCISES: CPT | Performed by: PHYSICAL THERAPIST

## 2019-01-31 ENCOUNTER — HOSPITAL ENCOUNTER (OUTPATIENT)
Dept: PHYSICAL THERAPY | Age: 74
Setting detail: THERAPIES SERIES
Discharge: HOME | End: 2019-01-31
Payer: MEDICARE

## 2019-01-31 PROCEDURE — 97112 NEUROMUSCULAR REEDUCATION: CPT | Performed by: PHYSICAL THERAPIST

## 2019-01-31 PROCEDURE — 97110 THERAPEUTIC EXERCISES: CPT | Performed by: PHYSICAL THERAPIST

## 2019-02-04 ENCOUNTER — HOSPITAL ENCOUNTER (OUTPATIENT)
Dept: PHYSICAL THERAPY | Age: 74
Setting detail: THERAPIES SERIES
Discharge: HOME | End: 2019-02-04
Payer: MEDICARE

## 2019-02-04 PROCEDURE — 97110 THERAPEUTIC EXERCISES: CPT | Performed by: PHYSICAL THERAPIST

## 2019-02-04 PROCEDURE — 97112 NEUROMUSCULAR REEDUCATION: CPT | Performed by: PHYSICAL THERAPIST

## 2019-02-06 ENCOUNTER — HOSPITAL ENCOUNTER (OUTPATIENT)
Dept: PHYSICAL THERAPY | Age: 74
Setting detail: THERAPIES SERIES
Discharge: HOME | End: 2019-02-06
Payer: MEDICARE

## 2019-02-06 PROCEDURE — 97110 THERAPEUTIC EXERCISES: CPT | Performed by: PHYSICAL THERAPIST

## 2019-02-06 PROCEDURE — 97112 NEUROMUSCULAR REEDUCATION: CPT | Performed by: PHYSICAL THERAPIST

## 2019-02-11 ENCOUNTER — APPOINTMENT (OUTPATIENT)
Dept: PHYSICAL THERAPY | Age: 74
End: 2019-02-11
Payer: MEDICARE

## 2019-02-13 ENCOUNTER — HOSPITAL ENCOUNTER (OUTPATIENT)
Dept: PHYSICAL THERAPY | Age: 74
Setting detail: THERAPIES SERIES
Discharge: HOME | End: 2019-02-13
Payer: MEDICARE

## 2019-02-13 PROCEDURE — 97112 NEUROMUSCULAR REEDUCATION: CPT | Performed by: PHYSICAL THERAPIST

## 2019-02-13 PROCEDURE — 97110 THERAPEUTIC EXERCISES: CPT | Performed by: PHYSICAL THERAPIST

## 2019-02-15 ENCOUNTER — CARE COORDINATION (OUTPATIENT)
Dept: CASE MANAGEMENT | Age: 74
End: 2019-02-15

## 2019-02-15 ENCOUNTER — HOSPITAL ENCOUNTER (OUTPATIENT)
Dept: PHYSICAL THERAPY | Age: 74
Setting detail: THERAPIES SERIES
Discharge: HOME | End: 2019-02-15
Payer: MEDICARE

## 2019-02-15 PROCEDURE — 97110 THERAPEUTIC EXERCISES: CPT | Performed by: PHYSICAL THERAPIST

## 2019-02-15 PROCEDURE — 97112 NEUROMUSCULAR REEDUCATION: CPT | Performed by: PHYSICAL THERAPIST

## 2019-02-19 ENCOUNTER — HOSPITAL ENCOUNTER (OUTPATIENT)
Dept: PHYSICAL THERAPY | Age: 74
Setting detail: THERAPIES SERIES
Discharge: HOME | End: 2019-02-19
Payer: MEDICARE

## 2019-02-19 PROCEDURE — 97112 NEUROMUSCULAR REEDUCATION: CPT | Performed by: PHYSICAL THERAPIST

## 2019-02-19 PROCEDURE — 97110 THERAPEUTIC EXERCISES: CPT | Performed by: PHYSICAL THERAPIST

## 2019-02-20 ENCOUNTER — OFFICE VISIT (OUTPATIENT)
Dept: ORTHOPEDIC SURGERY | Age: 74
End: 2019-02-20

## 2019-02-20 VITALS
DIASTOLIC BLOOD PRESSURE: 74 MMHG | HEART RATE: 80 BPM | SYSTOLIC BLOOD PRESSURE: 120 MMHG | WEIGHT: 113.1 LBS | HEIGHT: 61 IN | BODY MASS INDEX: 21.35 KG/M2

## 2019-02-20 DIAGNOSIS — Z96.641 S/P HIP REPLACEMENT, RIGHT: ICD-10-CM

## 2019-02-20 DIAGNOSIS — M25.551 RIGHT HIP PAIN: Primary | ICD-10-CM

## 2019-02-20 PROCEDURE — 99024 POSTOP FOLLOW-UP VISIT: CPT | Performed by: ORTHOPAEDIC SURGERY

## 2019-02-20 RX ORDER — CLINDAMYCIN HYDROCHLORIDE 150 MG/1
150 CAPSULE ORAL SEE ADMIN INSTRUCTIONS
Qty: 40 CAPSULE | Refills: 1 | Status: SHIPPED | OUTPATIENT
Start: 2019-02-20

## 2019-02-21 ENCOUNTER — HOSPITAL ENCOUNTER (OUTPATIENT)
Dept: PHYSICAL THERAPY | Age: 74
Setting detail: THERAPIES SERIES
Discharge: HOME | End: 2019-02-21
Payer: MEDICARE

## 2019-02-21 PROCEDURE — 97110 THERAPEUTIC EXERCISES: CPT | Performed by: PHYSICAL THERAPIST

## 2019-02-21 PROCEDURE — 97112 NEUROMUSCULAR REEDUCATION: CPT | Performed by: PHYSICAL THERAPIST

## 2019-02-25 ENCOUNTER — HOSPITAL ENCOUNTER (OUTPATIENT)
Dept: PHYSICAL THERAPY | Age: 74
Setting detail: THERAPIES SERIES
Discharge: HOME | End: 2019-02-25
Payer: MEDICARE

## 2019-02-25 PROCEDURE — 97112 NEUROMUSCULAR REEDUCATION: CPT | Performed by: PHYSICAL THERAPIST

## 2019-02-25 PROCEDURE — 97110 THERAPEUTIC EXERCISES: CPT | Performed by: PHYSICAL THERAPIST

## 2019-02-27 ENCOUNTER — HOSPITAL ENCOUNTER (OUTPATIENT)
Dept: PHYSICAL THERAPY | Age: 74
Setting detail: THERAPIES SERIES
Discharge: HOME | End: 2019-02-27
Payer: MEDICARE

## 2019-02-27 PROCEDURE — 97112 NEUROMUSCULAR REEDUCATION: CPT | Performed by: PHYSICAL THERAPIST

## 2019-02-27 PROCEDURE — 97110 THERAPEUTIC EXERCISES: CPT | Performed by: PHYSICAL THERAPIST

## 2019-03-04 ENCOUNTER — HOSPITAL ENCOUNTER (OUTPATIENT)
Dept: PHYSICAL THERAPY | Age: 74
Setting detail: THERAPIES SERIES
Discharge: HOME | End: 2019-03-04
Payer: MEDICARE

## 2019-03-04 PROCEDURE — 97110 THERAPEUTIC EXERCISES: CPT | Performed by: PHYSICAL THERAPIST

## 2019-03-04 PROCEDURE — 97112 NEUROMUSCULAR REEDUCATION: CPT | Performed by: PHYSICAL THERAPIST

## 2019-03-06 ENCOUNTER — HOSPITAL ENCOUNTER (OUTPATIENT)
Dept: PHYSICAL THERAPY | Age: 74
Setting detail: THERAPIES SERIES
Discharge: HOME | End: 2019-03-06
Payer: MEDICARE

## 2019-03-06 PROCEDURE — 97110 THERAPEUTIC EXERCISES: CPT | Performed by: PHYSICAL THERAPIST

## 2019-03-06 PROCEDURE — 97112 NEUROMUSCULAR REEDUCATION: CPT | Performed by: PHYSICAL THERAPIST

## 2019-03-11 ENCOUNTER — CARE COORDINATION (OUTPATIENT)
Dept: CASE MANAGEMENT | Age: 74
End: 2019-03-11

## 2019-03-12 ENCOUNTER — APPOINTMENT (OUTPATIENT)
Dept: PHYSICAL THERAPY | Age: 74
End: 2019-03-12
Payer: MEDICARE

## 2019-03-13 ENCOUNTER — HOSPITAL ENCOUNTER (OUTPATIENT)
Dept: PHYSICAL THERAPY | Age: 74
Setting detail: THERAPIES SERIES
Discharge: HOME | End: 2019-03-13
Payer: MEDICARE

## 2019-03-13 PROCEDURE — 97112 NEUROMUSCULAR REEDUCATION: CPT | Performed by: PHYSICAL THERAPIST

## 2019-03-13 PROCEDURE — 97110 THERAPEUTIC EXERCISES: CPT | Performed by: PHYSICAL THERAPIST

## 2019-03-15 ENCOUNTER — HOSPITAL ENCOUNTER (OUTPATIENT)
Dept: PHYSICAL THERAPY | Age: 74
Setting detail: THERAPIES SERIES
Discharge: HOME | End: 2019-03-15
Payer: MEDICARE

## 2019-03-15 PROCEDURE — 97112 NEUROMUSCULAR REEDUCATION: CPT | Performed by: PHYSICAL THERAPIST

## 2019-03-15 PROCEDURE — 97110 THERAPEUTIC EXERCISES: CPT | Performed by: PHYSICAL THERAPIST

## 2019-03-18 ENCOUNTER — CARE COORDINATION (OUTPATIENT)
Dept: CASE MANAGEMENT | Age: 74
End: 2019-03-18

## 2019-03-20 ENCOUNTER — HOSPITAL ENCOUNTER (OUTPATIENT)
Dept: PHYSICAL THERAPY | Age: 74
Setting detail: THERAPIES SERIES
Discharge: HOME | End: 2019-03-20
Payer: MEDICARE

## 2019-03-20 PROCEDURE — 97112 NEUROMUSCULAR REEDUCATION: CPT | Performed by: PHYSICAL THERAPIST

## 2019-03-20 PROCEDURE — 97110 THERAPEUTIC EXERCISES: CPT | Performed by: PHYSICAL THERAPIST

## 2019-03-21 ENCOUNTER — APPOINTMENT (RX ONLY)
Dept: URBAN - METROPOLITAN AREA CLINIC 170 | Facility: CLINIC | Age: 74
Setting detail: DERMATOLOGY
End: 2019-03-21

## 2019-03-21 DIAGNOSIS — L30.0 NUMMULAR DERMATITIS: ICD-10-CM

## 2019-03-21 PROCEDURE — 99213 OFFICE O/P EST LOW 20 MIN: CPT

## 2019-03-21 PROCEDURE — ? COUNSELING

## 2019-03-21 PROCEDURE — ? PRESCRIPTION

## 2019-03-21 RX ORDER — FLUOCINONIDE 0.5 MG/ML
OINTMENT TOPICAL
Qty: 1 | Refills: 2 | Status: ERX

## 2019-03-21 ASSESSMENT — LOCATION SIMPLE DESCRIPTION DERM: LOCATION SIMPLE: LEFT UPPER BACK

## 2019-03-21 ASSESSMENT — LOCATION ZONE DERM: LOCATION ZONE: TRUNK

## 2019-03-21 ASSESSMENT — LOCATION DETAILED DESCRIPTION DERM: LOCATION DETAILED: LEFT SUPERIOR UPPER BACK

## 2019-03-21 NOTE — HPI: RASH
What Type Of Note Output Would You Prefer (Optional)?: Standard Output
How Severe Is Your Rash?: mild
Is This A New Presentation, Or A Follow-Up?: Follow Up Rash
Additional History: Patient had joint replacement in January 2019.

## 2019-03-22 ENCOUNTER — HOSPITAL ENCOUNTER (OUTPATIENT)
Dept: PHYSICAL THERAPY | Age: 74
Setting detail: THERAPIES SERIES
Discharge: HOME | End: 2019-03-22
Payer: MEDICARE

## 2019-03-22 PROCEDURE — 97112 NEUROMUSCULAR REEDUCATION: CPT | Performed by: PHYSICAL THERAPIST

## 2019-03-22 PROCEDURE — 97110 THERAPEUTIC EXERCISES: CPT | Performed by: PHYSICAL THERAPIST

## 2019-03-25 ENCOUNTER — CARE COORDINATION (OUTPATIENT)
Dept: CASE MANAGEMENT | Age: 74
End: 2019-03-25

## 2019-03-27 ENCOUNTER — HOSPITAL ENCOUNTER (OUTPATIENT)
Dept: PHYSICAL THERAPY | Age: 74
Setting detail: THERAPIES SERIES
Discharge: HOME | End: 2019-03-27
Payer: MEDICARE

## 2019-03-27 PROCEDURE — 97112 NEUROMUSCULAR REEDUCATION: CPT | Performed by: PHYSICAL THERAPIST

## 2019-03-27 PROCEDURE — 97110 THERAPEUTIC EXERCISES: CPT | Performed by: PHYSICAL THERAPIST

## 2019-03-29 ENCOUNTER — HOSPITAL ENCOUNTER (OUTPATIENT)
Dept: PHYSICAL THERAPY | Age: 74
Setting detail: THERAPIES SERIES
Discharge: HOME | End: 2019-03-29
Payer: MEDICARE

## 2019-03-29 PROCEDURE — 97110 THERAPEUTIC EXERCISES: CPT | Performed by: PHYSICAL THERAPIST

## 2019-03-29 PROCEDURE — 97112 NEUROMUSCULAR REEDUCATION: CPT | Performed by: PHYSICAL THERAPIST

## 2019-04-02 ENCOUNTER — APPOINTMENT (OUTPATIENT)
Dept: PHYSICAL THERAPY | Age: 74
End: 2019-04-02
Payer: MEDICARE

## 2019-04-02 ENCOUNTER — HOSPITAL ENCOUNTER (OUTPATIENT)
Dept: PHYSICAL THERAPY | Age: 74
Setting detail: THERAPIES SERIES
Discharge: HOME | End: 2019-04-02
Payer: MEDICARE

## 2019-04-02 PROCEDURE — 97112 NEUROMUSCULAR REEDUCATION: CPT | Performed by: PHYSICAL THERAPIST

## 2019-04-02 PROCEDURE — 97110 THERAPEUTIC EXERCISES: CPT | Performed by: PHYSICAL THERAPIST

## 2019-04-02 NOTE — FLOWSHEET NOTE
The [facility] and [facility], [address] [place] [address] Drive [address], [address] [place] [address]  Phone: [phone]   Fax:     [phone]             Physical Therapy Daily Treatment Note  Date:  2019    Patient Name:  Matty Bocanegra    :  [birth year]  MRN: [id number]  Restrictions/Precautions:    Medical/Treatment Diagnosis Information:  Diagnosis: M16.11 (ICD-10-CM) - Arthritis of right hip,   Treatment Diagnosis: M25.551 Pain in right hip, M62.81 Muscle weakness (generalized)   Insurance/Certification information:  PT Insurance Information: Medicare  Physician Information:  Referring Practitioner: Michael Cyr MD  Plan of care signed (Y/N):     Date of Patient follow up with Physician:     G-Code (if applicable):      Date G-Code Applied:  19  PT G-Codes  Functional Assessment Tool Used: LEFI  Score: 59% LOF  Functional Limitation: Mobility: Walking and moving around  Mobility: Walking and Moving Around Current Status (): At least 40 percent but less than 60 percent impaired, limited or restricted  Mobility: Walking and Moving Around Goal Status (): At least 1 percent but less than 20 percent impaired, limited or restricted    Progress Note: []  Yes  [x]  No  Next due by: Visit #10 or week of 3/22/19      Latex Allergy:  [x]NO      []YES  Preferred Language for Healthcare:   [x]English       []other:    Visit # Insurance Allowable   20 (3/27/19)    Eval (19) Medicare     Pain level:  3/10     SUBJECTIVE:  4/2- Patient reports that her hip is doing well overall. Notes that her chief complaint is difficulty donning/doffing her sock.       OBJECTIVE: 3/22/19    ROM PROM AROM Comments    Left Right Left Right    Flexion   110      Extension        Abduction  23      Adduction        ER  20      IR  25              Knee Ext    +3    Knee flex    138      Not assessed due to recent surgery on 19 (19)  Flexibility Left Right Comments Hamstrings      ITB (Obers test)      Hip flexor(Ender test)      gastroc      Rectus femoris(Elys test)              Not assessed due to recent surgery on 1/7/19 (1/21/19)  Special  Test Left Right Comments   FABERS      Scour test      Impingement test      Trendelenburg test              Hip Strength not assessed due to recent surgery on 1/7/19 (1/21/19)  Strength Left Right Comments   Hip flexors  4    Hip extension      Hip abduction  4    Hip adduction      Hip ER  4    Hip IR  4    Quads  4+    Hamstrings  4+      Joint mobility: AFJ   []Normal    [x]Hypo   []Hyper    Palpation:     Functional Mobility/Transfers: independent without AD    Posture: WFL, slight forward head, knee valgus    Bandages/Dressings/Incisions: fully granulated    Gait: (include devices/WB status) normal gait without AD    RESTRICTIONS/PRECAUTIONS: Right YASEMIN 1/7/19- STANDARD HIP PRECAUTIONS    Exercises/Interventions:     ROM/STRETCHES     Seated hamstring  5x30\" Added 2/25   Supine hip flexion 5x30\" Added 3/4   Supine ER stretch 10x10\" Added 3/4   Supine piriformis     Supine figure 4     MANISH 3' Added 2/21   Quad       ITB     [place]     bike 8' Added 1/23   Hip flexor stretch kneeling     PREs     SLR add 2lb 3x10 Increased 4/2   SLR abd 2lb 3x10 Increased 4/2   D/c 2/13   D/c 2/25   Standing Hip Abd 5lb/3x10 Increased 2/13   Standing Hip Ext 5lb/3x10 Increased 2/13   Supine abduction with tband     Seated hip flexion limited 90 5lbs/3x10 Increased 3/4   SL clams Dark blue 3x10 Increased 2/6   D/c 3/22   SL dead lift     Biodex Balance RC 4' L11 Increased 3/4   Wall sit 5x30\"    Bridges w/ dark blue loop 3x10 Progressed 2/19   Step ups L2 3x10 Progressed 3/22   D/c 3/4   ecc leg press 80-10  70lbs/3x10 Increased 3/27   ecc Hamstring curls 0-90 20lb/3x10 Progressed 3/22   ecc Knee extensions 90-30 20lbs/3x10 Increased 4/2   Heel slides 10x10\" Added 1/21   Manual interventions     PROM flex 90/abd/ER/prone IR/ER 15' allowing for proper ROM for normal function with self care, mobility, lifting and ambulation. Modalities:  Ice 15' (1/31)    Charges:  Timed Code Treatment Minutes: 50'   Total Treatment Minutes: 10:12-11:40  88'       [] EVAL (LOW) 69539 (typically 20 minutes face-to-face)  [] EVAL (MOD) 93763 (typically 30 minutes face-to-face)  [] EVAL (HIGH) 03548 (typically 45 minutes face-to-face)  [] RE-EVAL     [x] MZ(25554) x  2   [] IONTO  [x] NMR (38600) x  1   [] VASO   [] Manual (10885) x       [] Other:  [] TA x       [] Mech Traction (08727)  [] ES(attended) (72300)      [] ES (un) (24566):     GOALS: 3/22/19  Short Term Goals: To be achieved in: 2 weeks   1. Independent in HEP and progression per patient tolerance, in order to prevent re-injury. MET  2. Patient will have a decrease in pain to facilitate improvement in movement, function, and ADLs as indicated by Functional Deficits. MET     Long Term Goals: To be achieved in: 12 weeks   1. Disability index score of 10% or less for the LEFS to assist with reaching prior level of function. ONGOING  2. Patient will demonstrate increased AROM to [facility] to allow for proper joint functioning as indicated by patients Functional Deficits. ONGOING  3. Patient will demonstrate an increase in Strength to good proximal hip strength and control in LE to allow for proper functional mobility as indicated by patients Functional Deficits. ONGOING  4. Patient will return to all functional activities without increased symptoms or restriction. ONGOING  5. Patient will be able to walk for 20 minutes without the use of an assistive device demonstrating WFL, pain free gait  MET        Progression Towards Functional goals:  [x] Patient is progressing as expected towards functional goals listed. [] Progression is slowed due to complexities listed. [] Progression has been slowed due to co-morbidities.   [] Plan just implemented, too soon to assess goals progression  [] Other: ASSESSMENT:      Treatment/Activity Tolerance:  [x] Patient tolerated treatment well [] Patient limited by fatique  [] Patient limited by pain  [] Patient limited by other medical complications  [x] Other:  4/2- No complaints with today's program. ROM restrictions are still present. Tight end-feel noted with PROM. Strength is progressing well. Able to progress resistance with PREs without problem. Patient education: Updated HEP provided and reviewed with patient (1/29/19)      Prognosis: [x] Good [] Fair  [] Poor    Patient Requires Follow-up: [x] Yes  [] No    PLAN:   [x] Continue per plan of care [] Alter current plan (see comments)  [] Plan of care initiated [] Hold pending MD visit [] Discharge    2x/week for 6 weeks. Electronically signed by: Neptali Officer      *If patient does not return for further follow ups after this date. Please consider this as the patients discharge from physical therapy.

## 2019-04-03 ENCOUNTER — CARE COORDINATION (OUTPATIENT)
Dept: CASE MANAGEMENT | Age: 74
End: 2019-04-03

## 2019-04-03 ENCOUNTER — OFFICE VISIT (OUTPATIENT)
Dept: ORTHOPEDIC SURGERY | Age: 74
End: 2019-04-03

## 2019-04-03 VITALS — BODY MASS INDEX: 21.35 KG/M2 | HEIGHT: 61 IN | WEIGHT: 113.1 LBS

## 2019-04-03 DIAGNOSIS — Z96.641 S/P HIP REPLACEMENT, RIGHT: Primary | ICD-10-CM

## 2019-04-03 PROCEDURE — 99024 POSTOP FOLLOW-UP VISIT: CPT | Performed by: ORTHOPAEDIC SURGERY

## 2019-04-03 NOTE — PROGRESS NOTES
Chief Complaint: Right total hip replacement    Patient returns to clinic for follow up of right total hip replacement done January 17, 2019    To date patient has had the following treatment: Physical therapy following hip replacement surgery    Patient states they are doing very well no complaints very happy with her outcome at this point, now 3 months out    Medical History:  Patient's medications, allergies, past medical, surgical, social and family histories were reviewed and updated as appropriate. ROS: Constitutional: No fever, no weight gain no chills  Skin: No rashes or ulceration regarding upper and lower extremities    Physical Exam: Normal gait leg lengths are equal range of motion is very good    Imaging Results: AP right hip shows good position the prosthesis no subsidence no heterotopic bone    Impression: Doing very well status post right total hip replacement    Plan:I informed the patient of my plans to leave [place] in late June to return to [place], [place] where I will be available for any questions they may have. With regards to follow-up on her right hip and recommend she see Dr. Jin Anglin in January 2020 for one year check. In the meantime she will take antibiotics for any dental or surgical procedures. With regards to physical therapy recommend she do exercises every other day, avoid supine straight leg raise is. She may walk bike or swim. Do not recommend leg press in the gym exercises for the gym were reviewed with the patient. There are no diagnoses linked to this encounter.

## 2019-04-03 NOTE — CARE COORDINATION
Name: Archie Burton  : [birth year]  MRN: <[id number]>    Procedure: Right YASEMIN 2019  SURGEON:  Richa Voss MD  Facility: [facility], [facility] with Archie Burton    Incision status: healed well no concerns    Edema/Swelling: none    Pain level and status: No pain    Use of pain medications: None    Bowels: active, no concerns    Home Care Agency active: N/A    Outpatient therapy: Completed    Do you have all of your medications: yes    Changes in medications: none    Has completed therapy, Will just work on flexibility with walking and swimming and doing her normal ADL. She will follow up with Dr. Nehemiah Nicolas in one year. Care transition complete.        Follow up appointments:    Future Appointments   Date Time Provider Alcon Roper   2019  1:30 PM Juan Antonio Feliciano, [address] [facility] PT None   2019 10:30 AM Juan Antonio Feliciano, PT [facility] PT None   2019  1:30 PM Juan Antonio Feliciano, [address] [place] [address] PT None     Seema Lester MSN, MA, RN  Care Transition Coordinator  [phone]

## 2019-04-04 ENCOUNTER — HOSPITAL ENCOUNTER (OUTPATIENT)
Dept: PHYSICAL THERAPY | Age: 74
Setting detail: THERAPIES SERIES
Discharge: HOME | End: 2019-04-04
Payer: MEDICARE

## 2019-04-04 PROCEDURE — 97112 NEUROMUSCULAR REEDUCATION: CPT | Performed by: PHYSICAL THERAPIST

## 2019-04-04 PROCEDURE — 97110 THERAPEUTIC EXERCISES: CPT | Performed by: PHYSICAL THERAPIST

## 2019-04-04 NOTE — DISCHARGE SUMMARY
The [facility] and [facility], [address] [place] [address] 3360 [facility], [address] [place] [address]  Phone: [phone]   Fax:     [phone]       Physical Therapy Discharge Summary    Dear  Dr Vanessa Santoro,    We had the pleasure of treating the following patient for physical therapy services at [address] [place] [address]. A summary of our findings can be found in the discharge summary below. If you have any questions or concerns regarding these findings, please do not hesitate to contact me at the office phone number above. Thank you for the referral.     Physician Signature:________________________________Date:__________________  By signing above (or electronic signature), therapists plan is approved by physician      Overall Response to Treatment:   [x]Patient is responding well to treatment and improvement is noted with regards  to goals   []Patient should continue to improve in reasonable time if they continue HEP   []Patient has plateaued and is no longer responding to skilled PT intervention    []Patient is getting worse and would benefit from return to referring MD   []Patient unable to adhere to initial POC   [x]Other: D/c to HEP.   F/u with PT prn    Date range of Visits: 19-19  Total Visits: 21           Physical Therapy Daily Treatment Note  Date:  2019    Patient Name:  Valentin Ding    :    MRN: [id number]  Restrictions/Precautions:    Medical/Treatment Diagnosis Information:  Diagnosis: M16.11 (ICD-10-CM) - Arthritis of right hip,   Treatment Diagnosis: M25.551 Pain in right hip, M62.81 Muscle weakness (generalized)   Insurance/Certification information:  PT Insurance Information: Medicare  Physician Information:  Referring Practitioner: Damien Donald MD  Plan of care signed (Y/N):     Date of Patient follow up with Physician:     G-Code (if applicable):      Date G-Code Applied:  19  Functional Assessment Tool Used: LEFI  Score: 20% LOF      Progress Note: []  Yes  [x]  No  Next due by:      Latex Allergy:  [x]NO      []YES    Preferred Language for Healthcare:   [x]English       []other:    Visit # Insurance Allowable   21 (4/4/19)    Eval (1/21/19) Medicare     Pain level:  3/10     SUBJECTIVE:  4/4- Patient reports that her hip is doing well overall. States that she saw the referring physician yesterday and he was pleased with her progress. MD advised pt to discontinue PT at this point.     OBJECTIVE: 4/4/19    ROM PROM AROM Comments    Left Right Left Right    Flexion   110      Extension        Abduction  21      Adduction        ER  19      IR  35              Knee Ext    +3    Knee flex    138        Flexibility Left Right Comments   Hamstrings      ITB (Obers test)      Hip flexor(Ender test)      gastroc      Rectus femoris(Elys test)                Special  Test Left Right Comments   FABERS      Scour test      Impingement test      Trendelenburg test                Strength Left Right Comments   Hip flexors  4+    Hip extension      Hip abduction  4+    Hip adduction      Hip ER  4+    Hip IR  4    Quads  4+    Hamstrings  4+      Joint mobility: AFJ   []Normal    [x]Hypo   []Hyper    Palpation:     Functional Mobility/Transfers: independent without AD    Posture: WFL, slight forward head, knee valgus    Bandages/Dressings/Incisions: fully granulated    Gait: (include devices/WB status) normal gait without AD    RESTRICTIONS/PRECAUTIONS: Right YASEMIN 1/7/19- STANDARD HIP PRECAUTIONS    Exercises/Interventions:     ROM/STRETCHES     Seated hamstring  5x30\" Added 2/25   Supine hip flexion 5x30\" Added 3/4   Supine ER stretch 10x10\" Added 3/4   Supine piriformis     Supine figure 4     MANISH 3' Added 2/21   Quad       ITB     Butterfly     bike 8' Added 1/23   Hip flexor stretch kneeling     PREs     SLR add 2lb 3x10 Increased 4/2   SLR abd 2lb 3x10 Increased 4/2   D/c 2/13   D/c 2/25   Standing Hip Abd 5lb/3x10 Increased 2/13   Standing Hip Ext 5lb/3x10 Increased 2/13   Supine abduction with tband     Seated hip flexion limited 90 5lbs/3x10 Increased 3/4   SL clams Dark blue 3x10 Increased 2/6   D/c 3/22   SL dead lift     Biodex Balance RC 4' L11 Increased 3/4   Wall sit 5x30\"    Bridges w/ dark blue loop 3x10 Progressed 2/19   Step ups L2 3x10 Progressed 3/22   D/c 3/4   ecc leg press 80-10  70lbs/3x10 Increased 3/27   ecc Hamstring curls 0-90 20lb/3x10 Progressed 3/22   ecc Knee extensions 90-30 20lbs/3x10 Increased 4/2   Heel slides 10x10\" Added 1/21   Manual interventions     PROM flex 90/abd/ER/prone IR/ER 15'        Therapeutic Exercise and NMR EXR  [x] (32199) Provided verbal/tactile cueing for activities related to strengthening, flexibility, endurance, ROM for improvements in LE, proximal hip, and core control with self care, mobility, lifting, ambulation.  [] (08204) Provided verbal/tactile cueing for activities related to improving balance, coordination, kinesthetic sense, posture, motor skill, proprioception  to assist with LE, proximal hip, and core control in self care, mobility, lifting, ambulation and eccentric single leg control.      NMR and Therapeutic Activities:    [x] (70925 or 40103) Provided verbal/tactile cueing for activities related to improving balance, coordination, kinesthetic sense, posture, motor skill, proprioception and motor activation to allow for proper function of core, proximal hip and LE with self care and ADLs  [] (57343) Gait Re-education- Provided training and instruction to the patient for proper LE, core and proximal hip recruitment and positioning and eccentric body weight control with ambulation re-education including up and down stairs     Home Exercise Program:    [x] (99120) Reviewed/Progressed HEP activities related to strengthening, flexibility, endurance, ROM of core, proximal hip and LE for functional self-care, mobility, lifting and ambulation/stair navigation   [] (05101)Reviewed/Progressed HEP activities related to improving balance, coordination, kinesthetic sense, posture, motor skill, proprioception of core, proximal hip and LE for self care, mobility, lifting, and ambulation/stair navigation      Manual Treatments:  PROM / STM / Oscillations-Mobs:  G-I, II, III, IV (PA's, Inf., Post.)  [] (46879) Provided manual therapy to mobilize LE, proximal hip and/or LS spine soft tissue/joints for the purpose of modulating pain, promoting relaxation,  increasing ROM, reducing/eliminating soft tissue swelling/inflammation/restriction, improving soft tissue extensibility and allowing for proper ROM for normal function with self care, mobility, lifting and ambulation. Modalities:  Ice 15' (1/31)    Charges:  Timed Code Treatment Minutes: 41'   Total Treatment Minutes: 1:31-2:45  74'       [] EVAL (LOW) 61920 (typically 20 minutes face-to-face)  [] EVAL (MOD) 56696 (typically 30 minutes face-to-face)  [] EVAL (HIGH) 40264 (typically 45 minutes face-to-face)  [] RE-EVAL     [x] AJ(45086) x  2   [] IONTO  [x] NMR (86071) x  1   [] VASO   [] Manual (06012) x       [] Other:  [] TA x       [] Mech Traction (20185)  [] ES(attended) (10613)      [] ES (un) (81885):     GOALS: 4/4/19  Short Term Goals: To be achieved in: 2 weeks   1. Independent in HEP and progression per patient tolerance, in order to prevent re-injury. MET  2. Patient will have a decrease in pain to facilitate improvement in movement, function, and ADLs as indicated by Functional Deficits. MET     Long Term Goals: To be achieved in: 12 weeks   1. Disability index score of 10% or less for the LEFS to assist with reaching prior level of function. NOT MET  2. Patient will demonstrate increased AROM to [facility] to allow for proper joint functioning as indicated by patients Functional Deficits. PARTIALLY MET  3.  Patient will demonstrate an increase in Strength to good proximal hip strength and control in LE to allow for proper functional mobility as indicated by patients Functional Deficits. MET  4. Patient will return to all functional activities without increased symptoms or restriction. PARTIALLY MET  5. Patient will be able to walk for 20 minutes without the use of an assistive device demonstrating WFL, pain free gait  MET        Progression Towards Functional goals:  [x] Patient is progressing as expected towards functional goals listed. [] Progression is slowed due to complexities listed. [] Progression has been slowed due to co-morbidities. [] Plan just implemented, too soon to assess goals progression  [] Other:     ASSESSMENT:      Treatment/Activity Tolerance:  [x] Patient tolerated treatment well [] Patient limited by fatique  [] Patient limited by pain  [] Patient limited by other medical complications  [x] Other:  4/4- No complaints with today's program. ROM restrictions are still present. Tight end-feel noted with PROM. Strength has progressed well. PT discussed appropriate HEP with pt. Pt verbally acknowledged good understanding of this. Pt no longer requires skilled PT. Recommend d/c to HEP. Patient education: Updated HEP provided and reviewed with patient (1/29/19)      Prognosis: [x] Good [] Fair  [] Poor    Patient Requires Follow-up: [] Yes  [x] No    PLAN:   [] Continue per plan of care [] Alter current plan (see comments)  [] Plan of care initiated [] Hold pending MD visit [x] Discharge    D/c to HEP. F/u with PT prn. Electronically signed by: Taylor Clause      *If patient does not return for further follow ups after this date. Please consider this as the patients discharge from physical therapy.

## 2019-04-09 ENCOUNTER — APPOINTMENT (OUTPATIENT)
Dept: PHYSICAL THERAPY | Age: 74
End: 2019-04-09
Payer: MEDICARE

## 2019-04-11 ENCOUNTER — HOSPITAL ENCOUNTER (OUTPATIENT)
Dept: PHYSICAL THERAPY | Age: 74
Setting detail: THERAPIES SERIES
End: 2019-04-11
Payer: MEDICARE

## 2019-04-24 ENCOUNTER — RX ONLY (OUTPATIENT)
Age: 74
Setting detail: RX ONLY
End: 2019-04-24

## 2019-04-24 RX ORDER — CLOBETASOL PROPIONATE 0.5 MG/G
OINTMENT TOPICAL
Qty: 1 | Refills: 3 | Status: ERX | COMMUNITY
Start: 2019-04-24

## 2019-08-01 ENCOUNTER — APPOINTMENT (RX ONLY)
Dept: URBAN - METROPOLITAN AREA CLINIC 170 | Facility: CLINIC | Age: 74
Setting detail: DERMATOLOGY
End: 2019-08-01

## 2019-08-01 DIAGNOSIS — D18.0 HEMANGIOMA: ICD-10-CM

## 2019-08-01 DIAGNOSIS — D22 MELANOCYTIC NEVI: ICD-10-CM

## 2019-08-01 DIAGNOSIS — L82.1 OTHER SEBORRHEIC KERATOSIS: ICD-10-CM

## 2019-08-01 DIAGNOSIS — L81.4 OTHER MELANIN HYPERPIGMENTATION: ICD-10-CM

## 2019-08-01 DIAGNOSIS — Z85.828 PERSONAL HISTORY OF OTHER MALIGNANT NEOPLASM OF SKIN: ICD-10-CM

## 2019-08-01 PROBLEM — D22.5 MELANOCYTIC NEVI OF TRUNK: Status: ACTIVE | Noted: 2019-08-01

## 2019-08-01 PROBLEM — D18.01 HEMANGIOMA OF SKIN AND SUBCUTANEOUS TISSUE: Status: ACTIVE | Noted: 2019-08-01

## 2019-08-01 PROCEDURE — 99213 OFFICE O/P EST LOW 20 MIN: CPT

## 2019-08-01 PROCEDURE — ? COUNSELING

## 2019-08-01 PROCEDURE — ? INVENTORY

## 2019-08-01 ASSESSMENT — LOCATION DETAILED DESCRIPTION DERM
LOCATION DETAILED: LEFT MEDIAL BREAST 10-11:00 REGION
LOCATION DETAILED: LEFT PROXIMAL LATERAL CALF
LOCATION DETAILED: MIDDLE STERNUM
LOCATION DETAILED: STERNAL NOTCH
LOCATION DETAILED: UPPER STERNUM
LOCATION DETAILED: LEFT MID-UPPER BACK

## 2019-08-01 ASSESSMENT — LOCATION SIMPLE DESCRIPTION DERM
LOCATION SIMPLE: LEFT UPPER BACK
LOCATION SIMPLE: LEFT BREAST
LOCATION SIMPLE: CHEST
LOCATION SIMPLE: LEFT CALF

## 2019-08-01 ASSESSMENT — LOCATION ZONE DERM
LOCATION ZONE: LEG
LOCATION ZONE: TRUNK

## 2019-08-01 NOTE — HPI: EVALUATION OF SKIN LESION(S)
What Type Of Note Output Would You Prefer (Optional)?: Bullet Format
Hpi Title: Evaluation of a Skin Lesion
How Severe Are Your Spot(S)?: mild
Have Your Spot(S) Been Treated In The Past?: has not been treated
Family Member: Mother
Additional History: Left claf irregular brown patch, unsure duration, asymptomatic.

## 2020-01-09 ENCOUNTER — OFFICE VISIT (OUTPATIENT)
Dept: ORTHOPEDIC SURGERY | Age: 75
End: 2020-01-09
Payer: MEDICARE

## 2020-01-09 VITALS
SYSTOLIC BLOOD PRESSURE: 135 MMHG | WEIGHT: 112 LBS | HEART RATE: 72 BPM | HEIGHT: 61 IN | BODY MASS INDEX: 21.14 KG/M2 | DIASTOLIC BLOOD PRESSURE: 74 MMHG

## 2020-01-09 PROBLEM — Z96.641 S/P HIP REPLACEMENT, RIGHT: Status: ACTIVE | Noted: 2020-01-09

## 2020-01-09 PROCEDURE — G8420 CALC BMI NORM PARAMETERS: HCPCS | Performed by: PHYSICIAN ASSISTANT

## 2020-01-09 PROCEDURE — 1123F ACP DISCUSS/DSCN MKR DOCD: CPT | Performed by: PHYSICIAN ASSISTANT

## 2020-01-09 PROCEDURE — 4040F PNEUMOC VAC/ADMIN/RCVD: CPT | Performed by: PHYSICIAN ASSISTANT

## 2020-01-09 PROCEDURE — 99213 OFFICE O/P EST LOW 20 MIN: CPT | Performed by: PHYSICIAN ASSISTANT

## 2020-01-09 PROCEDURE — 3017F COLORECTAL CA SCREEN DOC REV: CPT | Performed by: PHYSICIAN ASSISTANT

## 2020-01-09 PROCEDURE — G8427 DOCREV CUR MEDS BY ELIG CLIN: HCPCS | Performed by: PHYSICIAN ASSISTANT

## 2020-01-09 PROCEDURE — 1090F PRES/ABSN URINE INCON ASSESS: CPT | Performed by: PHYSICIAN ASSISTANT

## 2020-01-09 PROCEDURE — 1036F TOBACCO NON-USER: CPT | Performed by: PHYSICIAN ASSISTANT

## 2020-01-09 PROCEDURE — G8484 FLU IMMUNIZE NO ADMIN: HCPCS | Performed by: PHYSICIAN ASSISTANT

## 2020-01-09 PROCEDURE — G8400 PT W/DXA NO RESULTS DOC: HCPCS | Performed by: PHYSICIAN ASSISTANT

## 2020-01-09 ASSESSMENT — ENCOUNTER SYMPTOMS
COUGH: 0
ALLERGIC/IMMUNOLOGIC NEGATIVE: 1
ABDOMINAL PAIN: 0
SORE THROAT: 0
NAUSEA: 0
SHORTNESS OF BREATH: 0
WHEEZING: 0
VOMITING: 0
EYES NEGATIVE: 1
DIARRHEA: 0
CONSTIPATION: 0
BACK PAIN: 1

## 2020-01-09 NOTE — PROGRESS NOTES
is: Place total hip prosthetic no evidence of loosening or fracture. IMPRESSION   1 year status post total hip replacement    PLAN   The patient is doing well 1 year status post right total hip replacement. ICD-10-CM    1. S/P hip replacement, right Z96.641 XR HIP 2-3 VW W PELVIS RIGHT     -Patient is doing very well status post 1 year total right hip replacement.  -Advised patient that she could follow-up on a once year basis for evaluation of the total hip prosthetic.  -Discussed with the patient that she should continue to resume her exercises for her low back that she had done in the past and did discuss with the patient that if progression of her pain or worsening of her pain in her low back we could further evaluate her for this condition. Also recommend chiropractic therapy.

## 2020-08-03 ENCOUNTER — APPOINTMENT (RX ONLY)
Dept: URBAN - METROPOLITAN AREA CLINIC 170 | Facility: CLINIC | Age: 75
Setting detail: DERMATOLOGY
End: 2020-08-03

## 2020-08-03 DIAGNOSIS — L30.0 NUMMULAR DERMATITIS: ICD-10-CM

## 2020-08-03 DIAGNOSIS — Z85.828 PERSONAL HISTORY OF OTHER MALIGNANT NEOPLASM OF SKIN: ICD-10-CM

## 2020-08-03 DIAGNOSIS — L81.4 OTHER MELANIN HYPERPIGMENTATION: ICD-10-CM

## 2020-08-03 DIAGNOSIS — D18.0 HEMANGIOMA: ICD-10-CM

## 2020-08-03 DIAGNOSIS — L82.1 OTHER SEBORRHEIC KERATOSIS: ICD-10-CM

## 2020-08-03 DIAGNOSIS — D22 MELANOCYTIC NEVI: ICD-10-CM

## 2020-08-03 PROBLEM — D18.01 HEMANGIOMA OF SKIN AND SUBCUTANEOUS TISSUE: Status: ACTIVE | Noted: 2020-08-03

## 2020-08-03 PROBLEM — D22.5 MELANOCYTIC NEVI OF TRUNK: Status: ACTIVE | Noted: 2020-08-03

## 2020-08-03 PROCEDURE — ? PRESCRIPTION

## 2020-08-03 PROCEDURE — ? FULL BODY SKIN EXAM

## 2020-08-03 PROCEDURE — 99213 OFFICE O/P EST LOW 20 MIN: CPT

## 2020-08-03 PROCEDURE — ? ADDITIONAL NOTES

## 2020-08-03 PROCEDURE — ? COUNSELING

## 2020-08-03 RX ORDER — CLOBETASOL PROPIONATE 0.5 MG/G
OINTMENT TOPICAL BID
Qty: 1 | Refills: 0 | Status: ERX

## 2020-08-03 ASSESSMENT — LOCATION DETAILED DESCRIPTION DERM
LOCATION DETAILED: STERNAL NOTCH
LOCATION DETAILED: LEFT SUPERIOR UPPER BACK
LOCATION DETAILED: MIDDLE STERNUM
LOCATION DETAILED: LEFT MEDIAL BREAST 10-11:00 REGION
LOCATION DETAILED: RIGHT PROXIMAL CALF
LOCATION DETAILED: UPPER STERNUM

## 2020-08-03 ASSESSMENT — SEVERITY ASSESSMENT: SEVERITY: CLEAR

## 2020-08-03 ASSESSMENT — LOCATION SIMPLE DESCRIPTION DERM
LOCATION SIMPLE: RIGHT CALF
LOCATION SIMPLE: CHEST
LOCATION SIMPLE: LEFT UPPER BACK
LOCATION SIMPLE: LEFT BREAST

## 2020-08-03 ASSESSMENT — LOCATION ZONE DERM
LOCATION ZONE: TRUNK
LOCATION ZONE: LEG

## 2020-08-03 NOTE — PROCEDURE: MIPS QUALITY
Quality 431: Preventive Care And Screening: Unhealthy Alcohol Use - Screening: Patient screened for unhealthy alcohol use using a single question and scores less than 2 times per year
Quality 47: Advance Care Plan: Advance Care Planning discussed and documented; advance care plan or surrogate decision maker documented in the medical record.
Name And Contact Information For Health Care Proxy:  Trev Negroner
Quality 226: Preventive Care And Screening: Tobacco Use: Screening And Cessation Intervention: Patient screened for tobacco use and is an ex/non-smoker
Quality 111:Pneumonia Vaccination Status For Older Adults: Pneumococcal Vaccination Previously Received
Quality 130: Documentation Of Current Medications In The Medical Record: Current Medications Documented
Detail Level: Detailed

## 2020-08-03 NOTE — HPI: EVALUATION OF SKIN LESION(S)
What Type Of Note Output Would You Prefer (Optional)?: Bullet Format
Hpi Title: Evaluation of Skin Lesions
How Severe Are Your Spot(S)?: mild
Have Your Spot(S) Been Treated In The Past?: has been treated
Family Member: mother
Additional History: Her  had seen something on the back of her right calf that  he mentioned.

## 2020-09-21 ENCOUNTER — OFFICE VISIT (OUTPATIENT)
Dept: ORTHOPEDIC SURGERY | Age: 75
End: 2020-09-21
Payer: MEDICARE

## 2020-09-21 VITALS — BODY MASS INDEX: 21.14 KG/M2 | WEIGHT: 111.99 LBS | RESPIRATION RATE: 14 BRPM | TEMPERATURE: 98.4 F | HEIGHT: 61 IN

## 2020-09-21 PROCEDURE — 4040F PNEUMOC VAC/ADMIN/RCVD: CPT | Performed by: PHYSICAL MEDICINE & REHABILITATION

## 2020-09-21 PROCEDURE — G8420 CALC BMI NORM PARAMETERS: HCPCS | Performed by: PHYSICAL MEDICINE & REHABILITATION

## 2020-09-21 PROCEDURE — 99203 OFFICE O/P NEW LOW 30 MIN: CPT | Performed by: PHYSICAL MEDICINE & REHABILITATION

## 2020-09-21 PROCEDURE — 1090F PRES/ABSN URINE INCON ASSESS: CPT | Performed by: PHYSICAL MEDICINE & REHABILITATION

## 2020-09-21 PROCEDURE — G8427 DOCREV CUR MEDS BY ELIG CLIN: HCPCS | Performed by: PHYSICAL MEDICINE & REHABILITATION

## 2020-09-21 PROCEDURE — 1123F ACP DISCUSS/DSCN MKR DOCD: CPT | Performed by: PHYSICAL MEDICINE & REHABILITATION

## 2020-09-21 PROCEDURE — G8400 PT W/DXA NO RESULTS DOC: HCPCS | Performed by: PHYSICAL MEDICINE & REHABILITATION

## 2020-09-21 PROCEDURE — 3017F COLORECTAL CA SCREEN DOC REV: CPT | Performed by: PHYSICAL MEDICINE & REHABILITATION

## 2020-09-21 PROCEDURE — 1036F TOBACCO NON-USER: CPT | Performed by: PHYSICAL MEDICINE & REHABILITATION

## 2020-09-21 NOTE — PROGRESS NOTES
New Patient: SPINE    Referring Provider:  No ref. provider found    Chief Complaint   Patient presents with    Lower Back Pain     NP LSP - x20 yrs; (-) JOSELYN. c/o constant pain.  Hip Pain     NP R HIP - increased pain lately. (-) JOSELYN. c/o grabbing pain. HISTORY OF PRESENT ILLNESS:      · The patient is being sent at the request of No ref. provider found in consultation as a new spine patient for low back pain The patient is a 70 y.o. female whom reports low back pain for 20 years that has worsened over the past couple of months. Denies and injury or event that may have caused the symptoms. She does report she used to take Celebrex and Ibuprofen which she believes was masking her back pain. She no longer takes OTC anti inflammatory medications due to a hiatal hernia. She states her low back pain is constant and rates it 2/10. She describes her pain as dull and aching with an occasional sharp pain in the right low back. Bending, lifting, putting on socks and shoes and standing for a long period of time aggravates her pain. She reports pain is worse in the morning. Alleviating factors include heat, rest and sleeping with a pillow between her legs. She does not present with assistive devices for ambulation. Denies neurogenic bowel or bladder. · Patient reports hip replacement January of 2019 and a history of sciatica for which she has done extensive physical therapy.       Pain Assessment  Location of Pain: Back  Severity of Pain: 2  Quality of Pain: Dull, Aching  Duration of Pain: Persistent  Frequency of Pain: Constant  Date Pain First Started: (20+ YRS)  Aggravating Factors: Bending(SITTING)  Limiting Behavior: Yes  Result of Injury: No  Work-Related Injury: No  Are there other pain locations you wish to document?: No      Associated signs and symptoms:   Neurogenic bowel or bladder symptoms:  no   Perceived weakness:  no   Difficulty walking:  no    Recent Imaging (within past one year)   Xrays: yes   MRI or CT of spine: no    Current/Past Treatment:   · Physical Therapy:  yes  · Chiropractic:  none  · Injection:  none  · Medications:   NSAIDS:  yes   Muscle relaxer:  none   Steriods:  none   Neuropathic medications:  none   Opioids:  none  · Previous surgery:  no  · Previous surgical consult:  no  · Other:  · Infection control  · Tested positive for MRSA in past 12 months:  no  · Tested positive for MSSA \"staph infection\" in past 12 months: no  · Tested positive for VRE (Vancomycin Resistant Enterococci) in past 12 months:   no  · Currently on any antibiotics for an infection: no  · Anticoagulants:  · On a blood thinner:  no   · Any history of bleeding disorder: yes, hiatal hernia   · MRI Contraindication: no   · Previous Pain Management: no   · Goal for treatment Pain reduction  · How long can you stand? Sit? Walk? -       Past medical, surgical, social and family history reviewed with the patient.  No pertinent relevant history            Past Medical History:   Past Medical History:   Diagnosis Date    Arthritis     Environmental allergies     Histoplasmosis     right eye     Hyperlipidemia     Medical history reviewed with no changes     PONV (postoperative nausea and vomiting)       Past Surgical History:     Past Surgical History:   Procedure Laterality Date    CATARACT REMOVAL      MOHS SURGERY      nose- basal cell     TOTAL HIP ARTHROPLASTY Right 1/7/2019    RIGHT TOTAL HIP ARTHROPLASTY; PRP INJECTION OF RIGHT HIP performed by Barbra Sellers MD at [facility]       Current Medications:     Current Outpatient Medications:     Calcium Carb-Cholecalciferol (CALCIUM 600+D3 PO), Take by mouth, Disp: , Rfl:     Cholecalciferol (VITAMIN D-3 PO), Take 1,000 Units by mouth, Disp: , Rfl:     Probiotic Product (ALIGN PO), Take by mouth, Disp: , Rfl:     Glycerin-Polysorbate 80 (REFRESH DRY EYE THERAPY OP), Apply to eye, Disp: , Rfl:     clindamycin (CLEOCIN) 150 MG capsule, Take 1 capsule by mouth See Admin Instructions for 10 doses Take 4 capsules 1 hour prior to any surgical procedure, Disp: 40 capsule, Rfl: 1    vitamin C (ASCORBIC ACID) 500 MG tablet, Take 1,000 mg by mouth daily, Disp: , Rfl:     simvastatin (ZOCOR) 20 MG tablet, Take 20 mg by mouth nightly , Disp: , Rfl:   Allergies:  Patient has no known allergies. Social History:    reports that she has never smoked. She has never used smokeless tobacco. She reports that she does not drink alcohol or use drugs. Family History:   Family History   Family history unknown: Yes       REVIEW OF SYSTEMS: ROS - 14 point    Constitutional: No fevers, chills, night sweats, unexplained weight loss  Eye: No vision changes or diplopia  ENT: No nasal congestion, postnasal drip or sore throat. No tinnitus  Respiratory: No cough or SOB  CV: No chest pain or palpitations  GI: No nausea, abdominal pain, stool changes  : No dysuria or hematuria  Skin: No new or changing skin lesions, no rashes  MSK: No joint swelling, morning stiffness, unusual joint pain  Neurological: No headache, confusion, syncope  Psychiatric: No excessive anxiety or depression  Endocrine: No polyuria or polydipsia  Hematologic: No lymph node enlargement or excessive bleeding  Immunologic:No history of immune deficiency or immunomodulating drugs           PHYSICAL EXAM:    Vitals: Temperature 98.4 °F (36.9 °C), resp. rate 14, height 5' 0.98\" (1.549 m), weight 111 lb 15.9 oz (50.8 kg). GENERAL EXAM:  · General Apparence: Patient is adequately groomed with no evidence of malnutrition. · Psychiatric: Orientation: The patient is oriented to time, place and person. The patient's mood and affect are appropriate   · Vascular: Examination reveals no swelling and palpation reveals no tenderness in upper or lower extremities. Good capillary refill.    · The lymphatic examination of the neck, axillae and groin reveals all areas to be without enlargement or induration   Sensation is intact without deficit in the upper and lower extremities to light touch and pinprick  · Coordination of the upper and lower extremities are normal.    CERVICAL EXAMINATION:  · Inspection: Local inspection shows no step-off or bruising. Cervical alignment is normal. No instability is noted. · Palpation and Percussion: No evidence of tenderness at the midline. Paraspinal tenderness is not present. There is no paraspinal spasm. · Range of Motion:  limited by 25% in all planes due to pain   · Strength: 5/5 bilateral upper extremities  · Special Tests:   Spurling's and Nicole's are negative bilaterally. Rodriguez and Impingement tests are negative bilaterally. · Skin:There are no rashes, ulcerations or lesions. · Reflexes: Bilaterally triceps, biceps and brachioradialis are 1+. Clonus absent bilaterally at the feet. No pathological reflexes are noted. · Gait & station:  normal, patient ambulates without assistance and no ataxia  · Additional Examinations:  · RIGHT UPPER EXTREMITY:  Inspection/examination of the right upper extremity does not show any tenderness, deformity or injury. Range of motion is normal and pain-free. There is no gross instability. There are no rashes, ulcerations or lesions. Strength and tone are normal. No atrophy or abnormal movements are noted. · LEFT UPPER EXTREMITY: Inspection/examination of the left upper extremity does not show any tenderness, deformity or injury. Range of motion is normal and pain-free. There is no gross instability. There are no rashes, ulcerations or lesions. Strength and tone are normal. No atrophy or abnormal movements are noted. LUMBAR/SACRAL EXAMINATION:  · Inspection: Local inspection shows no step-off or bruising. Lumbar alignment is normal. No instability is noted. · Palpation:   No evidence of tenderness at the midline.  Lumbar paraspinal tenderness Mild L4/5 and L5/S1 tenderness  Bursal tenderness No tenderness bilaterally  There is no paraspinal spasm. · Range of Motion: painful with facet loading with extension and rotation to the right  · Strength:   Strength testing is 5/5 in all muscle groups tested. · Special Tests:   Straight leg raise and crossed SLR negative. Harry's testing is negative bilaterally. FADIR's testing is negative bilaterally. Slump test negative. Bowstring test negative  · Skin: There are no rashes, ulcerations or lesions. · Reflexes: Reflexes are symmetrically 1+ at the patellar and ankle tendons. Clonus absent bilaterally at the feet. · Gait & station: normal, patient ambulates without assistance and no ataxia  · Additional Examinations:  · RIGHT LOWER EXTREMITY: Inspection/examination of the right lower extremity does not show any tenderness, deformity or injury. Range of motion is unremarkable. There is no gross instability. There are no rashes, ulcerations or lesions. Strength and tone are normal. No atrophy or abnormal movements are noted. · LEFT LOWER EXTREMITY:  Inspection/examination of the left lower extremity does not show any tenderness, deformity or injury. Range of motion is unremarkable. There is no gross instability. There are no rashes, ulcerations or lesions. Strength and tone are normal. No atrophy or abnormal movements are noted. Diagnostic Testing:    Xrays:   AP and lateral of the lumbar spine taken today in the office show 5 lumbar type vertebrae, S shaped scoiliosis, severe DDD L1-L3, moderate DDD L3-L4, L5-S1. Status post right hip replacement.    MRI or CT:  None  EMG:  None  Results for orders placed or performed during the hospital encounter of 17/65/20   Basic Metabolic Panel   Result Value Ref Range    Sodium 136 136 - 145 mmol/L    Potassium 4.9 3.5 - 5.1 mmol/L    Chloride 101 99 - 110 mmol/L    CO2 28 21 - 32 mmol/L    Anion Gap 7 3 - 16    Glucose 135 (H) 70 - 99 mg/dL    BUN 10 7 - 20 mg/dL    CREATININE <0.5 (L) 0.6 - 1.2 mg/dL    GFR therapy within the last 9 months. 4. Interventional:  After further imaging is obtained, interventional options will be reviewed and recommended. 5. Healthy Lifestyle Measures:  Patient education material reviewing the following was distributed to Raphael Mejia  Anatomic drawings  Healthy lifestyle education  Osteoporosis prevention,   Back and neck pain educational information   Advanced imaging preparedness    Posture education   Proper lifting and carrying techniques,   Weight management  Quitting smoking and   Minor ways to treat back pain  For further information regarding the spine conditions and to review interventional treatments the patient was directed to [employer].    6.  Follow up:  1-2 weeks    Raphael Mejia was instructed to call the office if her symptoms worsen or if new symptoms appear prior to the next scheduled visit. She is specifically instructed to contact the office between now & her scheduled appointment if she has concerns related to her condition or if she needs assistance in scheduling the above tests. She is welcome to call for an appointment sooner if she has any additional concerns or questions. Greyson Ceron. La Nena Valera MD, [place], [facility]  Board Certified in [address] [place] [address] Dr Certified and Fellowship Trained in [facility] of [employer]     This dictation was performed with a verbal recognition program [facility]) and it was checked for errors. It is possible that there are still dictated errors within this office note. If so, please bring any errors to my attention for an addendum. All efforts were made to ensure that this office note is accurate.

## 2020-10-08 ENCOUNTER — OFFICE VISIT (OUTPATIENT)
Dept: ORTHOPEDIC SURGERY | Age: 75
End: 2020-10-08
Payer: MEDICARE

## 2020-10-08 VITALS — RESPIRATION RATE: 12 BRPM | BODY MASS INDEX: 21.14 KG/M2 | HEIGHT: 61 IN | TEMPERATURE: 97.5 F | WEIGHT: 111.99 LBS

## 2020-10-08 PROCEDURE — 99213 OFFICE O/P EST LOW 20 MIN: CPT | Performed by: PHYSICAL MEDICINE & REHABILITATION

## 2020-10-08 PROCEDURE — 4040F PNEUMOC VAC/ADMIN/RCVD: CPT | Performed by: PHYSICAL MEDICINE & REHABILITATION

## 2020-10-08 PROCEDURE — G8427 DOCREV CUR MEDS BY ELIG CLIN: HCPCS | Performed by: PHYSICAL MEDICINE & REHABILITATION

## 2020-10-08 PROCEDURE — G8484 FLU IMMUNIZE NO ADMIN: HCPCS | Performed by: PHYSICAL MEDICINE & REHABILITATION

## 2020-10-08 PROCEDURE — G8420 CALC BMI NORM PARAMETERS: HCPCS | Performed by: PHYSICAL MEDICINE & REHABILITATION

## 2020-10-08 PROCEDURE — 3017F COLORECTAL CA SCREEN DOC REV: CPT | Performed by: PHYSICAL MEDICINE & REHABILITATION

## 2020-10-08 PROCEDURE — 1090F PRES/ABSN URINE INCON ASSESS: CPT | Performed by: PHYSICAL MEDICINE & REHABILITATION

## 2020-10-08 PROCEDURE — 1123F ACP DISCUSS/DSCN MKR DOCD: CPT | Performed by: PHYSICAL MEDICINE & REHABILITATION

## 2020-10-08 PROCEDURE — 1036F TOBACCO NON-USER: CPT | Performed by: PHYSICAL MEDICINE & REHABILITATION

## 2020-10-08 PROCEDURE — G8400 PT W/DXA NO RESULTS DOC: HCPCS | Performed by: PHYSICAL MEDICINE & REHABILITATION

## 2020-10-08 NOTE — PROGRESS NOTES
Follow up: Michelle Ralph  1945  [id number]         Chief Complaint   Patient presents with    Lower Back Pain     TR MRI [facility] - 9/29/2020 @ [facility]          HISTORY OF PRESENT ILLNESS:  Ms. Francisco Mims is a 70 y.o. female returns for a follow up visit for multiple medical problems. Her current presenting problems are   1. Spondylolisthesis, lumbar region    2. Lumbar radiculopathy    3. DDD (degenerative disc disease), lumbar    . As per information/history obtained from the PADT(patient assessment and documentation tool) - She complains of pain in the lower back with radiation to the hips Bilateral She rates the pain 4/10 and describes it as aching. Pain is made worse by: movement. She denies side effects from the current pain regimen. Patient reports that since the last follow up visit the physical functioning is unchanged, family/social relationships are unchanged, mood is unchanged and sleep patterns are unchanged, and that the overall functioning is unchanged. Patient denies neurological bowel or bladder. Returns after undergoing an MRI of the lumbar spine. She reports she continues to have some intermittent right-sided low back pain but mainly has some left buttock pain when she walks for about 45 minutes        Associated signs and symptoms:   Neurogenic bowel or bladder symptoms:  no   Perceived weakness:  no   Difficulty walking:  no            Past medical, surgical, social and family history reviewed with the patient.  No pertinent relevant history  Past Medical History:   Past Medical History:   Diagnosis Date    Arthritis     Environmental allergies     Histoplasmosis     right eye     Hyperlipidemia     Medical history reviewed with no changes     PONV (postoperative nausea and vomiting)       Past Surgical History:     Past Surgical History:   Procedure Laterality Date    CATARACT REMOVAL      MOHS SURGERY      nose- basal cell     TOTAL HIP ARTHROPLASTY Right 1/7/2019 Good capillary refill. · Sensation is intact without deficit in the upper and lower extremities to light touch and pinprick  · Coordination of the upper and lower extremities are normal.      LUMBAR/SACRAL EXAMINATION:  · Inspection: Local inspection shows no step-off or bruising. Lumbar alignment is normal. No instability is noted. · Palpation:   No evidence of tenderness at the midline. Lumbar paraspinal tenderness: Mild L4/5 and L5/S1 tenderness  Bursal tenderness No tenderness bilaterally  There is no paraspinal spasm. · Range of Motion: limited by 25% in all planes due to pain  · Strength:   Strength testing is 5/5 in all muscle groups tested. · Special Tests:   Straight leg raise and crossed SLR negative. · Skin: There are no rashes, ulcerations or lesions. · Reflexes: Reflexes are symmetrically 1+ at the patellar and ankle tendons. Clonus absent bilaterally at the feet. · Gait & station: normal, patient ambulates without assistance and no ataxia  · Additional Examinations:  · RIGHT LOWER EXTREMITY: Inspection/examination of the right lower extremity does not show any tenderness, deformity or injury. Range of motion is normal and pain-free. There is no gross instability. There are no rashes, ulcerations or lesions. Strength and tone are normal. No atrophy or abnormal movements are noted. · LEFT LOWER EXTREMITY:  Inspection/examination of the left lower extremity does not show any tenderness, deformity or injury. Range of motion is normal and pain-free. There is no gross instability. There are no rashes, ulcerations or lesions. Strength and tone are normal. No atrophy or abnormal movements are noted. Diagnostic Testing:    MR Lumbar spine shows    T11-T12: Mild broad-based disc bulging causes mild central canal stenosis. No significant    foraminal narrowing. T12-L1:  Broad-based disc bulging and mild facet arthropathy. Mild central canal stenosis.     Moderate left and no significant right foraminal compromise.      L1-2:      1 to 2 mm retrolisthesis. Broad-based disc bulging and mild facet arthropathy. Mild    central canal stenosis. Mild to moderate left and right foraminal compromise.      L2-3:      2 mm retrolisthesis. Broad-based disc bulging and mild facet arthropathy. Mild    central canal stenosis. Mild left and moderate right foraminal compromise.        L3-4:      2 to 3 mm retrolisthesis. Broad-based disc bulging mild facet arthropathy. Mild    central canal stenosis. Moderate to severe right and mild left foraminal narrowing. Correlation     right L3 radiculopathy.      L4-5:      2 mm anterolisthesis. No spondylolysis. Broad-based disc bulging and moderate to    severe facet arthropathy. Mild central canal stenosis. Moderate to severe left and moderate    right foraminal narrowing. Correlation left L4 radiculopathy.      L5-S1:    Broad-based disc bulging and mild facet arthropathy. Small central focal protrusion. Mild central canal stenosis. Mild to moderate right and severe left foraminal narrowing. Correlation left L5 radiculopathy.        Results for orders placed or performed during the hospital encounter of 76/03/42   Basic Metabolic Panel   Result Value Ref Range    Sodium 136 136 - 145 mmol/L    Potassium 4.9 3.5 - 5.1 mmol/L    Chloride 101 99 - 110 mmol/L    CO2 28 21 - 32 mmol/L    Anion Gap 7 3 - 16    Glucose 135 (H) 70 - 99 mg/dL    BUN 10 7 - 20 mg/dL    CREATININE <0.5 (L) 0.6 - 1.2 mg/dL    GFR Non-African American >60 >60    GFR African American >60 >60    Calcium 9.0 8.3 - 10.6 mg/dL   Hemoglobin and hematocrit, blood   Result Value Ref Range    Hemoglobin 10.2 (L) 12.0 - 16.0 g/dL    Hematocrit 31.0 (L) 36.0 - 48.0 %   Basic Metabolic Panel   Result Value Ref Range    Sodium 136 136 - 145 mmol/L    Potassium 4.1 3.5 - 5.1 mmol/L    Chloride 100 99 - 110 mmol/L    CO2 28 21 - 32 mmol/L    Anion Gap 8 3 - 16    Glucose 96 70 - 99 mg/dL    BUN 10 7 - 20 mg/dL CREATININE <0.5 (L) 0.6 - 1.2 mg/dL    GFR Non-African American >60 >60    GFR African American >60 >60    Calcium 9.0 8.3 - 10.6 mg/dL   Hemoglobin and hematocrit, blood   Result Value Ref Range    Hemoglobin 10.2 (L) 12.0 - 16.0 g/dL    Hematocrit 30.3 (L) 36.0 - 48.0 %     Impression:       1. Spondylolisthesis, lumbar region    2. Lumbar radiculopathy    3. DDD (degenerative disc disease), lumbar        Plan:  Clinical Course: shows no change    I discussed the diagnosis and the treatment options with Angus Khalillynn today. In Summary:  The various treatment options were outlined and discussed with Giovanirey Joe including:  Conservative care options: physical therapy, ice, medications, bracing, and activity modification. The indications for therapeutic injections. The indications for additional imaging/laboratory studies. The indications for (possible future) interventions. After considering the various options discussed, Angus Diaz elected to pursue a course of treatment that includes the followin. Medications:  No further recommendations for new medications. 2. PT: We will set her up for a lumbar stabilization program.    3. Further studies: No further studies. 4. Interventional:  Consider L4/5 IL after PT    5. Follow up:  2-3 weeks      Angus Diaz was instructed to call the office if her symptoms worsen or if new symptoms appear prior to the next scheduled visit. She is specifically instructed to contact the office between now & her scheduled appointment if she has concerns related to her condition or if she needs assistance in scheduling the above tests. She is welcome to call for an appointment sooner if she has any additional concerns or questions. Irving Acosta.  Bonnie Coronel MD, SHE[place], [facility]  Board Certified in [address] [place] [address]  Certified and Fellowship Trained in [facility]             This dictation was performed with a verbal recognition program [facility]) and it was checked for errors. It is possible that there are still dictated errors within this office note. If so, please bring any errors to my attention for an addendum. All efforts were made to ensure that this office note is accurate.

## 2020-10-19 ENCOUNTER — HOSPITAL ENCOUNTER (OUTPATIENT)
Dept: PHYSICAL THERAPY | Age: 75
Setting detail: THERAPIES SERIES
Discharge: HOME | End: 2020-10-19
Payer: MEDICARE

## 2020-10-19 PROCEDURE — 97110 THERAPEUTIC EXERCISES: CPT

## 2020-10-19 PROCEDURE — 97161 PT EVAL LOW COMPLEX 20 MIN: CPT

## 2020-10-19 NOTE — FLOWSHEET NOTE
[facility]    Physical Therapy Daily Treatment Note  Date:  10/19/2020    Patient Name:  Shirley Parker   \"\"  :    MRN: [id number]  Restrictions/Precautions:    Medical/Treatment Diagnosis Information:  · Diagnosis: M43.16 (ICD-10-CM) - Spondylolisthesis, lumbar region  · Treatment Diagnosis: M54.5 Lumbar Pain; M51.35 DDD  Insurance/Certification information:  PT Insurance Information: Medicare  Physician Information:  Referring Practitioner: Delma Mcwilliams  Has the plan of care been signed (Y/N):        []  Yes  [x]  No     Date of Patient follow up with Physician: 20      Is this a Progress Report:     []  Yes  [x]  No        If Yes:  Date Range for reporting period:  Beginning  Ending    Progress report will be due (10 Rx or 30 days whichever is less):        Recertification will be due (POC Duration  / 90 days whichever is less): 20         Visit # Insurance Allowable Auth Required   1 Medicare []  Yes [x]  No        OUTCOME MEASURE DATE DEFICIT   QUEBEC 10/19/20 IE 39% Deficit        Patient given satisfaction survey?  [] Yes   [] No    Latex Allergy:  [x]NO      []YES  Preferred Language for Healthcare:   [x]English       []other:    Pain level:  2-4/10     SUBJECTIVE:  See eval    OBJECTIVE:     10/19/20  ROM   Comments   Trunk flexion Full - no pain Rib hump on R   Trunk extension 25% limited - painful General Lumbar pain    Trunk R sidebend 2\" above knee Deviates into flexion   Trunk L sidebend 2\" above knee     Trunk R rotation       Trunk L rotation       HS flexibility -30 degrees bilaterally 90/90   Quad flexibilty 90 degrees bilaterally Elys              10/19/20  Strength Left Right Comments   Hip flexion(L2) 4 4     Knee extension(L3) 5 5     Knee flexion(S1-2) 5 5     Ankle dorsiflexion(L4) 5 5     Toe extension(L5) NT NT     Ankle eversion/plantar flexion(S1) 5 5     Hip abd  3+ 3+         10/19/20  Special tests   Comments   SLR Neg Just HS tightness   Slump test Pos - mild sciatic nerve hypomobility bilaterally     Pelvic symmetry  Neg     Segmental Spinal mobility Mild hypomobility L4 and L5 PAs     Heel walk NT     Toe walk NT     Sensation Intact LE dermatomes                10/19/20 Deferred  DTRs Left Right Comments   Patellar(L3-L4)         Achilles(S1-S2)                      Joint mobility:               []? Normal               [x]? Hypo: decreased PAs L4 and L5                []? Hyper     Palpation: increased muscle tension throughout R and L Lumbar paraspinals; TTP along right QL     Functional Mobility/Transfers: ind     Posture: anteriorly rotated pelvis ; mild scoliosis with right rib hump     Bandages/Dressings/Incisions: n/a     Gait: (include devices/WB status) [facility];        RESTRICTIONS/PRECAUTIONS: HLD (medicated); Histoplasmosis (difficulty seeing out of R eye); has hearing aids both sides    Exercises/Interventions:   ROM/stretches     SKTC 3 x 30\" each    Prone quad stretch 3 x 30\" each Pillow under pelvis        Supine HS 3 x 30\" each With strap   Pelvic tilt 1 x 10 x 5\" holds    Hook lying rotation 10 x 10\"              Strengthening     TA Iso NPV    Alt LE Marches NPV    Bent Knee Fallouts NPV              SLR     Quadruped alternate UE reaches     Quadruped alternate LE reaches     Quadruped alternate UE/LE reaches     Amerityre heel raises     Sit ups      planks     Tband lat pulls     Tband rows         Manual Intervention             Prone PA      GISTM/STM 5'  IASTM to right Paraspinals and QL   Lumbar Manip      SI Manip      Hip belt mobs      Hip LA distraction              Patient Education:   10/19/20: Patient educated about PT diagnosis, prognosis, and plan of care; educated on role of physical therapy; educated on HEP; educated on anatomy of lumbar spine / lumbopelvic positioning.     HEP:  Patient instructed on HEP on this date with handout provided and all questions answered. Patient was instructed to contact PT with any complications or questions about HEP moving forward. Patient verbally stated she/he understood. Updated 10/19/20  [employer] CODE: [id number]    Therapeutic Exercise and NMR EXR  [x] (22971) Provided verbal/tactile cueing for activities related to strengthening, flexibility, endurance, ROM  for improvements in proximal hip and core control with self care, mobility, lifting and ambulation. [x] (41378) Provided verbal/tactile cueing for activities related to improving balance, coordination, kinesthetic sense, posture, motor skill, proprioception  to assist with core control in self care, mobility, lifting, and ambulation.      Therapeutic Activities:    [x] (67312 or 15397) Provided verbal/tactile cueing for activities related to improving balance, coordination, kinesthetic sense, posture, motor skill, proprioception and motor activation to allow for proper function  with self care and ADLs  [] (28801) Provided training and instruction to the patient for proper core and proximal hip recruitment and positioning with ambulation re-education     Home Exercise Program:    [x] (50084) Reviewed/Progressed HEP activities related to strengthening, flexibility, endurance, ROM of core, proximal hip and LE for functional self-care, mobility, lifting and ambulation   [] (61824) Reviewed/Progressed HEP activities related to improving balance, coordination, kinesthetic sense, posture, motor skill, proprioception of core, proximal hip and LE for self care, mobility, lifting, and ambulation      Manual Treatments:  PROM / STM / Oscillations-Mobs:  G-I, II, III, IV (PA's, Inf., Post.)  [x] (57351) Provided manual therapy to mobilize proximal hip and LS spine soft tissue/joints for the purpose of modulating pain, promoting relaxation,  increasing ROM, reducing/eliminating soft tissue swelling/inflammation/restriction, improving soft tissue extensibility and allowing for proper ROM for normal function with self care, mobility, lifting and ambulation. Modalities:   Declined    Charges:  Timed Code Treatment Minutes: 30   Total Treatment Minutes: 50   [id number]    [x] EVAL (LOW) 36535 (typically 20 minutes face-to-face)  [] EVAL (MOD) 66505 (typically 30 minutes face-to-face)  [] EVAL (HIGH) 51381 (typically 45 minutes face-to-face)  [] RE-EVAL     [x] DH(23329) x  2   [] IONTO  [] NMR (19724) x     [] VASO  [] Manual (32277) x      [] Other:  [] TA x      [] Mech Traction (19806)  [] ES(attended) (42994)      [] ES (un) (95063):     GOALS:   Patient stated goal: Return to walking without pain    [] Progressing: [] Met: [] Not Met: [] Adjusted    Therapist goals for Patient:   Short Term Goals: To be achieved in: 2 weeks  1. Independent in HEP and progression per patient tolerance, in order to prevent re-injury. [] Progressing: [] Met: [] Not Met: [] Adjusted   2. Patient will have a decrease in pain to facilitate improvement in movement, function, and ADLs as indicated by Functional Deficits. [] Progressing: [] Met: [] Not Met: [] Adjusted    Long Term Goals: To be achieved in: 8 weeks  1. Disability index score of 19.5% or less for the [place] to assist with reaching prior level of function. [] Progressing: [] Met: [] Not Met: [] Adjusted  2. Patient will demonstrate increased AROM to WNL, good LS mobility, good hip ROM to allow for proper joint functioning as indicated by patients Functional Deficits. [] Progressing: [] Met: [] Not Met: [] Adjusted  3. Patient will demonstrate an increase in Strength to good proximal hip and core activation to allow for proper functional mobility as indicated by patients Functional Deficits. [] Progressing: [] Met: [] Not Met: [] Adjusted  4. Patient will return to ADLs and other functional activities without increased symptoms or restriction. [] Progressing: [] Met: [] Not Met: [] Adjusted  5.  Patient will return to walking for >45 minutes without pain in lumbar spine / left gluteal region (patient specific functional goal)    [] Progressing: [] Met: [] Not Met: [] Adjusted     Overall Progression Towards Functional goals/ Treatment Progress Update:  [] Patient is progressing as expected towards functional goals listed. [] Progression is slowed due to complexities/Impairments listed. [] Progression has been slowed due to co-morbidities. [x] Plan just implemented, too soon to assess goals progression <30days   [] Goals require adjustment due to lack of progress  [] Patient is not progressing as expected and requires additional follow up with physician  [] Other    Prognosis for POC: [x] Good [] Fair  [] Poor      Patient requires continued skilled intervention: [x] Yes  [] No    Treatment/Activity Tolerance:  [x] Patient able to complete treatment  [] Patient limited by fatigue  [] Patient limited by pain     [] Patient limited by other medical complications  [] Other:     Prognosis: [] Good [x] Fair  [] Poor    Patient Requires Follow-up: [x] Yes  [] No    PLAN: See eval  [] Continue per plan of care [] Alter current plan (see comments)  [x] Plan of care initiated [] Hold pending MD visit [] Discharge    Electronically signed by: Vince Jaquez PT, DPT, OCS    *If patient does not return for further follow ups after this date. Please consider this as the patients discharge from physical therapy.

## 2020-10-19 NOTE — PLAN OF CARE
The [address] [place] [address] and Mayda Gillespie      Physical Therapy Certification    Dear Referring Practitioner: Sammy Navarro,    We had the pleasure of evaluating the following patient for physical therapy services at [address] [place] [address]. A summary of our findings can be found in the initial assessment below. This includes our plan of care. If you have any questions or concerns regarding these findings, please do not hesitate to contact me at the office phone number checked above. Thank you for the referral.       Physician Signature:_______________________________Date:__________________  By signing above (or electronic signature), therapists plan is approved by physician      Patient: Hector Douglas   :    MRN: [id number]  Referring Physician: Referring Practitioner: Sammy Navarro      Evaluation Date: 10/19/2020      Medical Diagnosis Information:  Diagnosis: M43.16 (ICD-10-CM) - Spondylolisthesis, lumbar region   Treatment Diagnosis: M54.5 Lumbar Pain; M51.35 DDD                                         Insurance information: PT Insurance Information: Medicare     Precautions/ Contra-indications: HLD (medicated); 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   [] Yes, Patient intake triggered further evaluation      [] C-SSRS Screening completed  [] PCP notified via Plan of Care  [] Emergency services notified     Latex Allergy:  [x]NO      []YES  Preferred Language for Healthcare:   [x]English       []other:    SUBJECTIVE: Patient stated complaint:70year old female presents to PT with complaint of low back pain. States this is a chronic issue on-going for 20 years. Pain is general across lumbar spine described as \"aching\". She will also get occasional \"grabbing\" pain on right side.  Left side pain can radiate to buttocks but only after 45 minutes of walking. No pain down her legs below buttocks. Denies any numbness/tingling. Denies any night pain. No significant weight loss. No balance disturbances.          Relevant Medical History:Right hip replacement in Januay 2019  Functional Disability Index/G-Codes:   OUTCOME MEASURE DATE DEFICIT   QUEBEC 10/19/20 IE 39% Deficit            Pain Scale: 1-2/10 at rest; 4/10 at worst  Easing factors: Heat; Ibuprofen PRN but tries not to use this  Provocative factors: prolonged positions (sitting, standing, walking); prolonged flexion; heavy house work     Type: [x]Constant           []Intermittent      [x]Radiating (left buttock)         [x]Localized         []other:                Numbness/Tingling: Denies     Occupation/School:  Retired;      Living Status/Prior Level of Function: Independent with ADLs and IADLs; enjoys walking for exercise     OBJECTIVE:     10/19/20  ROM   Comments   Trunk flexion Full - no pain Rib hump on R   Trunk extension 25% limited - painful General Lumbar pain    Trunk R sidebend 2\" above knee Deviates into flexion   Trunk L sidebend 2\" above knee    Trunk R rotation     Trunk L rotation     HS flexibility -30 degrees bilaterally 90/90   Quad flexibilty 90 degrees bilaterally Elys              10/19/20  Strength Left Right Comments   Hip flexion(L2) 4 4    Knee extension(L3) 5 5    Knee flexion(S1-2) 5 5    Ankle dorsiflexion(L4) 5 5    Toe extension(L5) NT NT    Ankle eversion/plantar flexion(S1) 5 5    Hip abd  3+ 3+         10/19/20  Special tests   Comments   SLR Neg Just HS tightness   Slump test Pos - mild sciatic nerve hypomobility bilaterally     Pelvic symmetry  Neg     Segmental Spinal mobility Mild hypomobility L4 and L5 PAs     Heel walk NT     Toe walk NT     Sensation Intact LE dermatomes                10/19/20 Deferred  DTRs Left Right Comments   Patellar(L3-L4)       Achilles(S1-S2)                    Joint mobility:               []Normal [x]Hypo: decreased PAs L4 and L5               []Hyper     Palpation: increased muscle tension throughout R and L Lumbar paraspinals; TTP along right QL     Functional Mobility/Transfers: ind     Posture: anteriorly rotated pelvis ; mild scoliosis with right rib hump     Bandages/Dressings/Incisions: n/a     Gait: (include devices/WB status) [facility];                           [x] Patient history, allergies, meds reviewed. Medical chart reviewed. See intake form. Review Of Systems (ROS):  [x]Performed Review of systems (Integumentary, CardioPulmonary, Neurological) by intake and observation. Intake form has been scanned into medical record. Patient has been instructed to contact their primary care physician regarding ROS issues if not already being addressed at this time.        Co-morbidities/Complexities (which will affect course of rehabilitation):   []None              Arthritic conditions   []Rheumatoid arthritis (M05.9)  [x]Osteoarthritis (M19.91)    Cardiovascular conditions   []Hypertension (I10)  [x]Hyperlipidemia (E78.5)  []Angina pectoris (I20)  []Atherosclerosis (I70)    Musculoskeletal conditions   []Disc pathology   []Congenital spine pathologies   [x]Prior surgical intervention  []Osteoporosis (M81.8)  []Osteopenia (M85.8)   Endocrine conditions   []Hypothyroid (E03.9)  []Hyperthyroid Gastrointestinal conditions   []Constipation (W83.76)    Metabolic conditions   []Morbid obesity (E66.01)  []Diabetes type 1(E10.65) or 2 (E11.65)   []Neuropathy (G60.9)      Pulmonary conditions   []Asthma (J45)  []Coughing   []COPD (J44.9)    Psychological Disorders  []Anxiety (F41.9)  []Depression (F32.9)   []Other:    []Other:            Barriers to/and or personal factors that will affect rehab potential:              [x]Age  [x]Sex              [x]Motivation/Lack of Motivation                        []Co-Morbidities              []Cognitive Function, education/learning barriers []Environmental, home barriers              []profession/work barriers  [x]past PT/medical experience  []other:  Justification:      Falls Risk Assessment (30 days):   [x] Falls Risk assessed and no intervention required.   [] Falls Risk assessed and Patient requires intervention due to being higher risk   TUG score (>12s at risk):     [] Falls education provided, including       G-Codes:   OUTCOME MEASURE DATE DEFICIT   QUEBEC 10/19/20 IE 39% Deficit               ASSESSMENT:   Functional Impairments:                [x]Noted lumbar/proximal hip hypomobility              []Noted lumbosacral and/or generalized hypermobility              [x]Decreased Lumbosacral/hip/LE functional ROM              [x]Decreased core/proximal hip strength and neuromuscular control               [x]Decreased LE functional strength               []Abnormal reflexes/sensation/myotomal/dermatomal deficits  []Reduced balance/proprioceptive control               []other:       Functional Activity Limitations (from functional questionnaire and intake)              [x]Reduced ability to tolerate prolonged functional positions              [x]Reduced ability or difficulty with changes of positions or transfers between positions              [x]Reduced ability to maintain good posture and demonstrate good body mechanics with sitting, bending, and lifting              []Reduced ability to sleep              [x] Reduced ability or tolerance with driving and/or computer work              [x]Reduced ability to perform lifting, reaching, carrying tasks              [x]Reduced ability to squat              [x]Reduced ability to forward bend              [x]Reduced ability to ambulate prolonged functional periods/distances/surfaces              [x]Reduced ability to ascend/descend stairs              []other:       Participation Restrictions              [x]Reduced participation in self care activities              [x]Reduced participation in home management activities              []Reduced participation in work activities              [x]Reduced participation in social activities. []Reduced participation in sport/recreational activities. Classification:              []Signs/symptoms consistent with Lumbar instability/stabilization subgroup. []Signs/symptoms consistent with Lumbar mobilization/manipulation subgroup, myotomes and dermatomes intact. Meets manipulation criteria. []Signs/symptoms consistent with Lumbar direction specific/centralization subgroup              []Signs/symptoms consistent with Lumbar traction subgroup                            []Signs/symptoms consistent with lumbar facet dysfunction              [x]Signs/symptoms consistent with lumbar stenosis type dysfunction              []Signs/symptoms consistent with nerve root involvement including myotome & dermatome dysfunction              []Signs/symptoms consistent with post-surgical status including: decreased ROM, strength and function.               [x]signs/symptoms consistent with pathology which may benefit from Dry needling                []other:       Prognosis/Rehab Potential:                                       []Excellent              []Good                 [x]Fair              []Poor     Tolerance of evaluation/treatment:               []Excellent              []Good                 [x]Fair              []Poor     Physical Therapy Evaluation Complexity Justification  [x] A history of present problem with:  [] no personal factors and/or comorbidities that impact the plan of care;  []1-2 personal factors and/or comorbidities that impact the plan of care  [x]3 personal factors and/or comorbidities that impact the plan of care  [x] An examination of body systems using standardized tests and measures addressing any of the following: body structures and functions (impairments), activity limitations, and/or participation restrictions;:  [] a total of 1-2 or more elements   [] a total of 3 or more elements   [x] a total of 4 or more elements   [x] A clinical presentation with:  [x] stable and/or uncomplicated characteristics   [] evolving clinical presentation with changing characteristics  [] unstable and unpredictable characteristics;   [x] Clinical decision making of [x] low, [] moderate, [] high complexity using standardized patient assessment instrument and/or measurable assessment of functional outcome. [x] EVAL (LOW) 35746 (typically 20 minutes face-to-face)  [] EVAL (MOD) 97101 (typically 30 minutes face-to-face)  [] EVAL (HIGH) 22328 (typically 45 minutes face-to-face)  [] RE-EVAL            PLAN:      Frequency/Duration:  2 days per week for 8 Weeks:  Interventions:  [x]  Therapeutic exercise including: strength training, ROM, for LE, Glutes and core   [x]  NMR activation and proprioception for glutes , LE and Core   [x]  Manual therapy as indicated for Hip complex, LE and spine to include: Dry Needling/IASTM, STM, PROM, Gr I-IV mobilizations, manipulation. [x]  Modalities as needed that may include: thermal agents, E-stim, Biofeedback, US, iontophoresis as indicated  [x]  Patient education on joint protection, postural re-education, activity modification, progression of HEP. HEP instruction[de-identified]  Patient instructed on HEP on this date with handout provided and all questions answered. Patient was instructed to contact PT with any complications or questions about HEP moving forward. Patient verbally stated she understood. [facility] CODE: [id number]     GOALS:   Patient stated goal: Return to walking without pain    [] Progressing: [] Met: [] Not Met: [] Adjusted    Therapist goals for Patient:   Short Term Goals: To be achieved in: 2 weeks  1. Independent in HEP and progression per patient tolerance, in order to prevent re-injury. [] Progressing: [] Met: [] Not Met: [] Adjusted   2.  Patient will have a decrease in pain to facilitate improvement in movement, function, and ADLs as indicated by Functional Deficits. [] Progressing: [] Met: [] Not Met: [] Adjusted    Long Term Goals: To be achieved in: 8 weeks  1. Disability index score of 19.5% or less for the [place] to assist with reaching prior level of function. [] Progressing: [] Met: [] Not Met: [] Adjusted  2. Patient will demonstrate increased AROM to WNL, good LS mobility, good hip ROM to allow for proper joint functioning as indicated by patients Functional Deficits. [] Progressing: [] Met: [] Not Met: [] Adjusted  3. Patient will demonstrate an increase in Strength to good proximal hip and core activation to allow for proper functional mobility as indicated by patients Functional Deficits. [] Progressing: [] Met: [] Not Met: [] Adjusted  4. Patient will return to ADLs and other functional activities without increased symptoms or restriction. [] Progressing: [] Met: [] Not Met: [] Adjusted  5. Patient will return to walking for >45 minutes without pain in lumbar spine / left gluteal region (patient specific functional goal)    [] Progressing: [] Met: [] Not Met: [] Adjusted       Electronically signed by: Real Meredith, PT, DPT, OCS    *Note: If patient does not return for scheduled / recommended follow up visit, this note will serve as their discharge note from physical therapy.

## 2020-10-26 ENCOUNTER — HOSPITAL ENCOUNTER (OUTPATIENT)
Dept: PHYSICAL THERAPY | Age: 75
Setting detail: THERAPIES SERIES
Discharge: HOME | End: 2020-10-26
Payer: MEDICARE

## 2020-10-26 PROCEDURE — 97112 NEUROMUSCULAR REEDUCATION: CPT

## 2020-10-26 PROCEDURE — 97110 THERAPEUTIC EXERCISES: CPT

## 2020-10-26 NOTE — FLOWSHEET NOTE
The [address] [facility]    Physical Therapy Daily Treatment Note  Date:  10/26/2020    Patient Name:  Shirley Parker   \""  :    MRN: [id number]  Restrictions/Precautions:    Medical/Treatment Diagnosis Information:  · Diagnosis: M43.16 (ICD-10-CM) - Spondylolisthesis, lumbar region  · Treatment Diagnosis: M54.5 Lumbar Pain; M51.35 DDD  Insurance/Certification information:  PT Insurance Information: Medicare  Physician Information:  Referring Practitioner: Delma Mcwilliams  Has the plan of care been signed (Y/N):        [x]  Yes  []  No     Date of Patient follow up with Physician: 20      Is this a Progress Report:     []  Yes  [x]  No        If Yes:  Date Range for reporting period:  Beginning  Ending    Progress report will be due (10 Rx or 30 days whichever is less):        Recertification will be due (POC Duration  / 90 days whichever is less): 20         Visit # Insurance Allowable Auth Required   2 Medicare []  Yes [x]  No        OUTCOME MEASURE DATE DEFICIT   QUEBEC 10/19/20 IE 39% Deficit        Patient given satisfaction survey? [] Yes   [] No    Latex Allergy:  [x]NO      []YES  Preferred Language for Healthcare:   [x]English       []other:    Pain level:  2/10     SUBJECTIVE:  Doing well this morning. No changes since IE last week. HEP going well but gets some back and neck pain at times with prone quad stretch.      OBJECTIVE:     10/19/20  ROM   Comments   Trunk flexion Full - no pain Rib hump on R   Trunk extension 25% limited - painful General Lumbar pain    Trunk R sidebend 2\" above knee Deviates into flexion   Trunk L sidebend 2\" above knee     Trunk R rotation       Trunk L rotation       HS flexibility -30 degrees bilaterally 90/90   Quad flexibilty 90 degrees bilaterally Elys              10/19/20  Strength Left Right Comments   Hip flexion(L2) 4 4     Knee extension(L3) 5 5     Knee flexion(S1-2) 5 5     Ankle dorsiflexion(L4) 5 5     Toe extension(L5) NT NT     Ankle eversion/plantar flexion(S1) 5 5     Hip abd  3+ 3+         10/19/20  Special tests   Comments   SLR Neg Just HS tightness   Slump test Pos - mild sciatic nerve hypomobility bilaterally     Pelvic symmetry  Neg     Segmental Spinal mobility Mild hypomobility L4 and L5 PAs     Heel walk NT     Toe walk NT     Sensation Intact LE dermatomes                10/19/20 Deferred  DTRs Left Right Comments   Patellar(L3-L4)         Achilles(S1-S2)                      Joint mobility:               []? Normal               [x]? Hypo: decreased PAs L4 and L5                []? 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); has hearing aids both sides    Exercises/Interventions:   ROM/stretches     SKTC 3 x 30\" each    Prone quad stretch 3 x 30\" each Pillow under pelvis        Supine HS 3 x 30\" each With strap   Pelvic tilt 1 x 10 x 5\" holds    Hook lying rotation 10 x 10\"              Strengthening     TA Iso 10 x 5\" In PPT   Alt LE Marches 2 x 10 In PPT   Bent Knee Fallouts 2 x 10 In PPT        Side-lying clamshells 3 x 10 each No Res   SLR     Quadruped alternate UE reaches     Quadruped alternate LE reaches     Quadruped alternate UE/LE reaches     Ball squats     Ball heel raises     Sit ups      planks     Tband lat pulls     Tband rows         Manual Intervention             Prone PA      GISTM/STM 5'  IASTM to left and right Paraspinals and QL   Lumbar Manip      SI Manip      Hip belt mobs      Hip LA distraction              Patient Education:   10/19/20: Patient educated about PT diagnosis, prognosis, and plan of care; the purpose of modulating pain, promoting relaxation,  increasing ROM, reducing/eliminating soft tissue swelling/inflammation/restriction, improving soft tissue extensibility and allowing for proper ROM for normal function with self care, mobility, lifting and ambulation. Modalities:   Declined    Charges:  Timed Code Treatment Minutes: 40   Total Treatment Minutes: 40   8219-1012    [] EVAL (LOW) 15719 (typically 20 minutes face-to-face)  [] EVAL (MOD) 32557 (typically 30 minutes face-to-face)  [] EVAL (HIGH) 71557 (typically 45 minutes face-to-face)  [] RE-EVAL     [x] WB(98275) x  2   [] IONTO  [x] NMR (33997) x  1   [] VASO  [] Manual (25421) x      [] Other:  [] TA x      [] Mech Traction (28087)  [] ES(attended) (16679)      [] ES (un) (77310):     GOALS:   Patient stated goal: Return to walking without pain    [] Progressing: [] Met: [] Not Met: [] Adjusted    Therapist goals for Patient:   Short Term Goals: To be achieved in: 2 weeks  1. Independent in HEP and progression per patient tolerance, in order to prevent re-injury. [] Progressing: [] Met: [] Not Met: [] Adjusted   2. Patient will have a decrease in pain to facilitate improvement in movement, function, and ADLs as indicated by Functional Deficits. [] Progressing: [] Met: [] Not Met: [] Adjusted    Long Term Goals: To be achieved in: 8 weeks  1. Disability index score of 19.5% or less for the [place] to assist with reaching prior level of function. [] Progressing: [] Met: [] Not Met: [] Adjusted  2. Patient will demonstrate increased AROM to WNL, good LS mobility, good hip ROM to allow for proper joint functioning as indicated by patients Functional Deficits. [] Progressing: [] Met: [] Not Met: [] Adjusted  3. Patient will demonstrate an increase in Strength to good proximal hip and core activation to allow for proper functional mobility as indicated by patients Functional Deficits. [] Progressing: [] Met: [] Not Met: [] Adjusted  4. Patient will return to ADLs and other functional activities without increased symptoms or restriction. [] Progressing: [] Met: [] Not Met: [] Adjusted  5. Patient will return to walking for >45 minutes without pain in lumbar spine / left gluteal region (patient specific functional goal)    [] Progressing: [] Met: [] Not Met: [] Adjusted     Overall Progression Towards Functional goals/ Treatment Progress Update:  [] Patient is progressing as expected towards functional goals listed. [] Progression is slowed due to complexities/Impairments listed. [] Progression has been slowed due to co-morbidities. [x] Plan just implemented, too soon to assess goals progression <30days   [] Goals require adjustment due to lack of progress  [] Patient is not progressing as expected and requires additional follow up with physician  [] Other    Prognosis for POC: [x] Good [] Fair  [] Poor      Patient requires continued skilled intervention: [x] Yes  [] No    Treatment/Activity Tolerance:  [x] Patient able to complete treatment  [] Patient limited by fatigue  [] Patient limited by pain     [] Patient limited by other medical complications  [x] Other: Tolerated program well today. Gets intermittent cramping in hamstrings with quad stretching and needs cues to relax hamstrings during stretch to prevent this. Mild fatigue with TA progressions today but able to show improvement in lumbopelvic control as she went through both sets of exercises. HEP updated. Continue PT to improve patient's BLE flexibility, strength, and core strength in order to reduce pain in back and return her to PLOF.      Prognosis: [] Good [x] Fair  [] Poor    Patient Requires Follow-up: [x] Yes  [] No    PLAN: See eval  [] Continue per plan of care [] Alter current plan (see comments)  [x] Plan of care initiated [] Hold pending MD visit [] Discharge    Electronically signed by: Oliva Cortes PT, DPT, OCS    *If patient does not return for further follow ups after this date. Please consider this as the patients discharge from physical therapy.

## 2020-10-28 ENCOUNTER — HOSPITAL ENCOUNTER (OUTPATIENT)
Dept: PHYSICAL THERAPY | Age: 75
Setting detail: THERAPIES SERIES
Discharge: HOME | End: 2020-10-28
Payer: MEDICARE

## 2020-10-28 PROCEDURE — 97110 THERAPEUTIC EXERCISES: CPT

## 2020-10-28 PROCEDURE — 97112 NEUROMUSCULAR REEDUCATION: CPT

## 2020-10-28 NOTE — FLOWSHEET NOTE
[facility]    Physical Therapy Daily Treatment Note  Date:  10/28/2020    Patient Name:  Saskia Landry   \""  :    MRN: [id number]  Restrictions/Precautions:    Medical/Treatment Diagnosis Information:  · Diagnosis: M43.16 (ICD-10-CM) - Spondylolisthesis, lumbar region  · Treatment Diagnosis: M54.5 Lumbar Pain; M51.35 DDD  Insurance/Certification information:  PT Insurance Information: Medicare  Physician Information:  Referring Practitioner: Jairo Bui  Has the plan of care been signed (Y/N):        [x]  Yes  []  No     Date of Patient follow up with Physician: 20      Is this a Progress Report:     []  Yes  [x]  No        If Yes:  Date Range for reporting period:  Beginning  Ending    Progress report will be due (10 Rx or 30 days whichever is less):        Recertification will be due (POC Duration  / 90 days whichever is less): 20         Visit # Insurance Allowable Auth Required   3 Medicare []  Yes [x]  No        OUTCOME MEASURE DATE DEFICIT   QUEBEC 10/19/20 IE 39% Deficit        Patient given satisfaction survey? [] Yes   [] No    Latex Allergy:  [x]NO      []YES  Preferred Language for Healthcare:   [x]English       []other:    Pain level:  2/10     SUBJECTIVE:  Continues to do well. No new issues to report. [place] fine after PT on Monday.     OBJECTIVE:     10/19/20  ROM   Comments   Trunk flexion Full - no pain Rib hump on R   Trunk extension 25% limited - painful General Lumbar pain    Trunk R sidebend 2\" above knee Deviates into flexion   Trunk L sidebend 2\" above knee     Trunk R rotation       Trunk L rotation       HS flexibility -30 degrees bilaterally 90/90   Quad flexibilty 90 degrees bilaterally Elys              10/19/20  Strength Left Right Comments   Hip flexion(L2) 4 4     Knee extension(L3) 5 5     Knee flexion(S1-2) 5 5     Ankle dorsiflexion(L4) 5 5     Toe extension(L5) NT NT     Ankle eversion/plantar flexion(S1) 5 5     Hip abd  3+ 3+         10/19/20  Special tests   Comments   SLR Neg Just HS tightness   Slump test Pos - mild sciatic nerve hypomobility bilaterally     Pelvic symmetry  Neg     Segmental Spinal mobility Mild hypomobility L4 and L5 PAs     Heel walk NT     Toe walk NT     Sensation Intact LE dermatomes                10/19/20 Deferred  DTRs Left Right Comments   Patellar(L3-L4)         Achilles(S1-S2)                      Joint mobility:               []? Normal               [x]? Hypo: decreased PAs L4 and L5                []? Hyper     Palpation: increased muscle tension throughout R and L Lumbar paraspinals; TTP along right QL     Functional Mobility/Transfers: ind     Posture: anteriorly rotated pelvis ; mild scoliosis with right rib hump     Bandages/Dressings/Incisions: n/a     Gait: (include devices/WB status) 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); has hearing aids both sides    Exercises/Interventions:   ROM/stretches     SKTC 3 x 30\" each    Prone quad stretch 3 x 30\" each Pillow under pelvis        Supine HS 3 x 30\" each With strap   Pelvic tilt 1 x 10 x 5\" holds    Hook lying rotation 10 x 10\"              Strengthening     TA Iso 10 x 5\" In PPT   Alt LE Marches 3 x 10 In PPT   Bent Knee Fallouts 3 x 10 In PPT   Alt Heel Slides 2 x 10 In PPT             Supine Clamshells 3 x 5 each with Blue Band One side moves at a time   Side-lying clamshells 3 x 10 each Blue Band   SLR Abduction 2 x 10 each    Quadruped alternate UE reaches     Quadruped alternate LE reaches     Quadruped alternate UE/LE reaches     Ball squats     Ball heel raises     Sit ups      planks     Tband lat pulls     Tband rows         Manual Intervention             Prone PA      Lumbar Manip      SI Manip      Hip belt mobs      Hip LA distraction              Patient Education:   10/19/20: Patient educated about PT diagnosis, prognosis, and plan of care; educated on role of physical therapy; educated on HEP; educated on anatomy of lumbar spine / lumbopelvic positioning. HEP:  Patient instructed on HEP on this date with handout provided and all questions answered. Patient was instructed to contact PT with any complications or questions about HEP moving forward. Patient verbally stated she/he understood. Updated 10/28/20  SCVNGR CODE: [id number]    Therapeutic Exercise and NMR EXR  [x] (26398) Provided verbal/tactile cueing for activities related to strengthening, flexibility, endurance, ROM  for improvements in proximal hip and core control with self care, mobility, lifting and ambulation. [x] (20983) Provided verbal/tactile cueing for activities related to improving balance, coordination, kinesthetic sense, posture, motor skill, proprioception  to assist with core control in self care, mobility, lifting, and ambulation.      Therapeutic Activities:    [x] (10060 or 24773) Provided verbal/tactile cueing for activities related to improving balance, coordination, kinesthetic sense, posture, motor skill, proprioception and motor activation to allow for proper function  with self care and ADLs  [] (36631) Provided training and instruction to the patient for proper core and proximal hip recruitment and positioning with ambulation re-education     Home Exercise Program:    [x] (22515) Reviewed/Progressed HEP activities related to strengthening, flexibility, endurance, ROM of core, proximal hip and LE for functional self-care, mobility, lifting and ambulation   [] (97498) Reviewed/Progressed HEP activities related to improving balance, coordination, kinesthetic sense, posture, motor skill, proprioception of core, proximal hip and LE for self care, mobility, lifting, and ambulation      Manual Treatments:  PROM / STM / Oscillations-Mobs:  G-I, II, III, IV (PA's, Inf., Post.)  [x] (16776) Provided manual therapy to mobilize proximal hip and LS spine soft tissue/joints for the purpose of modulating pain, promoting relaxation,  increasing ROM, reducing/eliminating soft tissue swelling/inflammation/restriction, improving soft tissue extensibility and allowing for proper ROM for normal function with self care, mobility, lifting and ambulation. Modalities:   Declined    Charges:  Timed Code Treatment Minutes: 45   Total Treatment Minutes: 45   [id number]    [] EVAL (LOW) 00728 (typically 20 minutes face-to-face)  [] EVAL (MOD) 84198 (typically 30 minutes face-to-face)  [] EVAL (HIGH) 77306 (typically 45 minutes face-to-face)  [] RE-EVAL     [x] OX(98282) x  2   [] IONTO  [x] NMR (70619) x  1   [] VASO  [] Manual (75563) x      [] Other:  [] TA x      [] Mech Traction (31453)  [] ES(attended) (50810)      [] ES (un) (75291):     GOALS:   Patient stated goal: Return to walking without pain    [] Progressing: [] Met: [] Not Met: [] Adjusted    Therapist goals for Patient:   Short Term Goals: To be achieved in: 2 weeks  1. Independent in HEP and progression per patient tolerance, in order to prevent re-injury. [] Progressing: [] Met: [] Not Met: [] Adjusted   2. Patient will have a decrease in pain to facilitate improvement in movement, function, and ADLs as indicated by Functional Deficits. [] Progressing: [] Met: [] Not Met: [] Adjusted    Long Term Goals: To be achieved in: 8 weeks  1. Disability index score of 19.5% or less [facility] to assist with reaching prior level of function. [] Progressing: [] Met: [] Not Met: [] Adjusted  2. Patient will demonstrate increased AROM to WNL, good LS mobility, good hip ROM to allow for proper joint functioning as indicated by patients Functional Deficits. [] Progressing: [] Met: [] Not Met: [] Adjusted  3. Patient will demonstrate an increase in Strength to good proximal hip and core activation to allow for proper functional mobility as indicated by patients Functional Deficits.   [] Progressing: [] Met: [] Not Met: [] Adjusted  4. Patient will return to ADLs and other functional activities without increased symptoms or restriction. [] Progressing: [] Met: [] Not Met: [] Adjusted  5. Patient will return to walking for >45 minutes without pain in lumbar spine / left gluteal region (patient specific functional goal)    [] Progressing: [] Met: [] Not Met: [] Adjusted     Overall Progression Towards Functional goals/ Treatment Progress Update:  [] Patient is progressing as expected towards functional goals listed. [] Progression is slowed due to complexities/Impairments listed. [] Progression has been slowed due to co-morbidities. [x] Plan just implemented, too soon to assess goals progression <30days   [] Goals require adjustment due to lack of progress  [] Patient is not progressing as expected and requires additional follow up with physician  [] Other    Prognosis for POC: [x] Good [] Fair  [] Poor      Patient requires continued skilled intervention: [x] Yes  [] No    Treatment/Activity Tolerance:  [x] Patient able to complete treatment  [] Patient limited by fatigue  [] Patient limited by pain     [] Patient limited by other medical complications  [x] Other: Tolerated program well today. Needed tactile cueing to prevent posterior trunk rolling during side-lying hip abduction. Noticeable anterior pelvic tilting during heel slides at end range requiring verbal and tactile cues to correct. Improved after cueing but had a hard time feeling her compensations herself. HEP updated. Continue PT to improve patient's BLE flexibility, strength, and core strength in order to reduce pain in back and return her to PLOF.      Prognosis: [] Good [x] Fair  [] Poor    Patient Requires Follow-up: [x] Yes  [] No    PLAN: See eval  [] Continue per plan of care [] Alter current plan (see comments)  [x] Plan of care initiated [] Hold pending MD visit [] Discharge    Electronically signed by: Phoebe Boucher PT, DPT, OCS    *If patient does not return for further follow ups after this date. Please consider this as the patients discharge from physical therapy.

## 2020-11-02 ENCOUNTER — HOSPITAL ENCOUNTER (OUTPATIENT)
Dept: PHYSICAL THERAPY | Age: 75
Setting detail: THERAPIES SERIES
Discharge: HOME | End: 2020-11-02
Payer: MEDICARE

## 2020-11-02 PROCEDURE — 97110 THERAPEUTIC EXERCISES: CPT

## 2020-11-02 PROCEDURE — 97112 NEUROMUSCULAR REEDUCATION: CPT

## 2020-11-02 NOTE — FLOWSHEET NOTE
[facility]    Physical Therapy Daily Treatment Note  Date:  2020    Patient Name:  Beata Braun   \"\"  :  [birth year]  MRN: [id number]  Restrictions/Precautions:    Medical/Treatment Diagnosis Information:  · Diagnosis: M43.16 (ICD-10-CM) - Spondylolisthesis, lumbar region  · Treatment Diagnosis: M54.5 Lumbar Pain; M51.35 DDD  Insurance/Certification information:  PT Insurance Information: Medicare  Physician Information:  Referring Practitioner: Quan Portillo  Has the plan of care been signed (Y/N):        [x]  Yes  []  No     Date of Patient follow up with Physician: 20      Is this a Progress Report:     []  Yes  [x]  No        If Yes:  Date Range for reporting period:  Beginning  Ending    Progress report will be due (10 Rx or 30 days whichever is less):        Recertification will be due (POC Duration  / 90 days whichever is less): 20         Visit # Insurance Allowable Auth Required   4 Medicare []  Yes [x]  No        OUTCOME MEASURE DATE DEFICIT   QUEBEC 10/19/20 IE 39% Deficit        Patient given satisfaction survey? [] Yes   [] No    Latex Allergy:  [x]NO      []YES  Preferred Language for Healthcare:   [x]English       []other:    Pain level:  1/10     SUBJECTIVE:  Continues to do well. Minimal pain in back this morning. Reports daily compliance with HEP - takes her almost an hour to do everything.  Does not get \"grabbing\" pain on right side anymore since starting PT.     OBJECTIVE:     10/19/20  ROM   Comments   Trunk flexion Full - no pain Rib hump on R   Trunk extension 25% limited - painful General Lumbar pain    Trunk R sidebend 2\" above knee Deviates into flexion   Trunk L sidebend 2\" above knee     Trunk R rotation       Trunk L rotation       HS flexibility -30 degrees bilaterally 90/90   Quad flexibilty 90 degrees bilaterally Elys              10/19/20  Strength Left Right Comments   Hip flexion(L2) 4 4     Knee Patient Education:   10/19/20: Patient educated about PT diagnosis, prognosis, and plan of care; educated on role of physical therapy; educated on HEP; educated on anatomy of lumbar spine / lumbopelvic positioning. HEP:  Patient instructed on HEP on this date with handout provided and all questions answered. Patient was instructed to contact PT with any complications or questions about HEP moving forward. Patient verbally stated she/he understood. Updated 11/2/20  "[employer] CODE: [id number]    Therapeutic Exercise and NMR EXR  [x] (13660) Provided verbal/tactile cueing for activities related to strengthening, flexibility, endurance, ROM  for improvements in proximal hip and core control with self care, mobility, lifting and ambulation. [x] (58331) Provided verbal/tactile cueing for activities related to improving balance, coordination, kinesthetic sense, posture, motor skill, proprioception  to assist with core control in self care, mobility, lifting, and ambulation.      Therapeutic Activities:    [x] (09140 or 97673) Provided verbal/tactile cueing for activities related to improving balance, coordination, kinesthetic sense, posture, motor skill, proprioception and motor activation to allow for proper function  with self care and ADLs  [] (77847) Provided training and instruction to the patient for proper core and proximal hip recruitment and positioning with ambulation re-education     Home Exercise Program:    [x] (82677) Reviewed/Progressed HEP activities related to strengthening, flexibility, endurance, ROM of core, proximal hip and LE for functional self-care, mobility, lifting and ambulation   [] (46517) Reviewed/Progressed HEP activities related to improving balance, coordination, kinesthetic sense, posture, motor skill, proprioception of core, proximal hip and LE for self care, mobility, lifting, and ambulation      Manual Treatments:  PROM / STM / Oscillations-Mobs:  G-I, II, III, IV (PA's, Inf., Post.)  [x] (12375) Provided manual therapy to mobilize proximal hip and LS spine soft tissue/joints for the purpose of modulating pain, promoting relaxation,  increasing ROM, reducing/eliminating soft tissue swelling/inflammation/restriction, improving soft tissue extensibility and allowing for proper ROM for normal function with self care, mobility, lifting and ambulation. Modalities:   Declined    Charges:  Timed Code Treatment Minutes: 40   Total Treatment Minutes: 40   [id number]    [] EVAL (LOW) 69522 (typically 20 minutes face-to-face)  [] EVAL (MOD) 85334 (typically 30 minutes face-to-face)  [] EVAL (HIGH) 22652 (typically 45 minutes face-to-face)  [] RE-EVAL     [x] FQ(49213) x  2   [] IONTO  [x] NMR (05443) x  1   [] VASO  [] Manual (17065) x      [] Other:  [] TA x      [] Mech Traction (46525)  [] ES(attended) (47682)      [] ES (un) (07269):     GOALS:   Patient stated goal: Return to walking without pain    [] Progressing: [] Met: [] Not Met: [] Adjusted    Therapist goals for Patient:   Short Term Goals: To be achieved in: 2 weeks  1. Independent in HEP and progression per patient tolerance, in order to prevent re-injury. [] Progressing: [] Met: [] Not Met: [] Adjusted   2. Patient will have a decrease in pain to facilitate improvement in movement, function, and ADLs as indicated by Functional Deficits. [] Progressing: [] Met: [] Not Met: [] Adjusted    Long Term Goals: To be achieved in: 8 weeks  1. Disability index score of 19.5% or less for the [place] to assist with reaching prior level of function. [] Progressing: [] Met: [] Not Met: [] Adjusted  2. Patient will demonstrate increased AROM to WNL, good LS mobility, good hip ROM to allow for proper joint functioning as indicated by patients Functional Deficits. [] Progressing: [] Met: [] Not Met: [] Adjusted  3.  Patient will demonstrate an increase in Strength to good proximal hip and core activation to allow for proper functional mobility as indicated by patients Functional Deficits. [] Progressing: [] Met: [] Not Met: [] Adjusted  4. Patient will return to ADLs and other functional activities without increased symptoms or restriction. [] Progressing: [] Met: [] Not Met: [] Adjusted  5. Patient will return to walking for >45 minutes without pain in lumbar spine / left gluteal region (patient specific functional goal)    [] Progressing: [] Met: [] Not Met: [] Adjusted     Overall Progression Towards Functional goals/ Treatment Progress Update:  [] Patient is progressing as expected towards functional goals listed. [] Progression is slowed due to complexities/Impairments listed. [] Progression has been slowed due to co-morbidities. [x] Plan just implemented, too soon to assess goals progression <30days   [] Goals require adjustment due to lack of progress  [] Patient is not progressing as expected and requires additional follow up with physician  [] Other    Prognosis for POC: [x] Good [] Fair  [] Poor      Patient requires continued skilled intervention: [x] Yes  [] No    Treatment/Activity Tolerance:  [x] Patient able to complete treatment  [] Patient limited by fatigue  [] Patient limited by pain     [] Patient limited by other medical complications  [x] Other: Tolerated program well today. Much better technique and control of pelvis during alternating heel slides today showing improvement in her core strength / lumbopelvic control. Able to also increase resistance on clamshell exercise showing glute strength improvement from a week ago. Discussed monitoring of symptoms day to day and to know when she has reached a fatigue point that she stops / rests to help with over doing it. HEP updated. Continue PT to improve patient's BLE flexibility, strength, and core strength in order to reduce pain in back and return her to PLOF.      Prognosis: [] Good [x] Fair  [] Poor    Patient Requires Follow-up: [x] Yes  [] No    PLAN: See

## 2020-11-04 ENCOUNTER — HOSPITAL ENCOUNTER (OUTPATIENT)
Dept: PHYSICAL THERAPY | Age: 75
Setting detail: THERAPIES SERIES
Discharge: HOME | End: 2020-11-04
Payer: MEDICARE

## 2020-11-04 PROCEDURE — 97112 NEUROMUSCULAR REEDUCATION: CPT

## 2020-11-04 PROCEDURE — 97110 THERAPEUTIC EXERCISES: CPT

## 2020-11-04 NOTE — FLOWSHEET NOTE
The [facility]    Physical Therapy Daily Treatment Note  Date:  2020    Patient Name:  Kristin Harris   \""  :    MRN: [id number]  Restrictions/Precautions:    Medical/Treatment Diagnosis Information:  · Diagnosis: M43.16 (ICD-10-CM) - Spondylolisthesis, lumbar region  · Treatment Diagnosis: M54.5 Lumbar Pain; M51.35 DDD  Insurance/Certification information:  PT Insurance Information: Medicare  Physician Information:  Referring Practitioner: Jude Freitas  Has the plan of care been signed (Y/N):        [x]  Yes  []  No     Date of Patient follow up with Physician: 20      Is this a Progress Report:     []  Yes  [x]  No        If Yes:  Date Range for reporting period:  Beginning  Ending    Progress report will be due (10 Rx or 30 days whichever is less):        Recertification will be due (POC Duration  / 90 days whichever is less): 20         Visit # Insurance Allowable Auth Required   5 Medicare []  Yes [x]  No        OUTCOME MEASURE DATE DEFICIT   QUEBEC 10/19/20 IE 39% Deficit        Patient given satisfaction survey? [] Yes   [] No    Latex Allergy:  [x]NO      []YES  Preferred Language for Healthcare:   [x]English       []other:    Pain level:  1/10 low back, 2/10 left glute while walking     SUBJECTIVE:  Tried a few walks the past few day sand immediately gets pain in left gluteal region. Does not radiate or intensify as she keeps walking. Pain described as \"aching\".       OBJECTIVE:     10/19/20  ROM   Comments   Trunk flexion Full - no pain Rib hump on R   Trunk extension 25% limited - painful General Lumbar pain    Trunk R sidebend 2\" above knee Deviates into flexion   Trunk L sidebend 2\" above knee     Trunk R rotation       Trunk L rotation       HS flexibility -30 degrees bilaterally 90/90   Quad flexibilty 90 degrees bilaterally Elys              10/19/20  Strength Left Right Comments   Hip flexion(L2) 4 4     Knee extension(L3) 5 5     Knee flexion(S1-2) 5 5     Ankle dorsiflexion(L4) 5 5     Toe extension(L5) NT NT     Ankle eversion/plantar flexion(S1) 5 5     Hip abd  3+ 3+         10/19/20  Special tests   Comments   SLR Neg Just HS tightness   Slump test Pos - mild sciatic nerve hypomobility bilaterally     Pelvic symmetry  Neg     Segmental Spinal mobility Mild hypomobility L4 and L5 PAs     Heel walk NT     Toe walk NT     Sensation Intact LE dermatomes                10/19/20 Deferred  DTRs Left Right Comments   Patellar(L3-L4)         Achilles(S1-S2)                      Joint mobility:               []? Normal               [x]? Hypo: decreased PAs L4 and L5                []? 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); has hearing aids both sides    Exercises/Interventions:   ROM/stretches     SKTC 3 x 30\" each    Prone quad stretch 3 x 30\" each Pillow under pelvis        Supine HS 3 x 30\" each With strap      Hook lying rotation 10 x 10\"    Figure 4 3 x 30\" each Push away  Pull to chest        Strengthening     Alt Heel Slides 3 x 10 In PPT   Alt LE March / UE Raise 3 x 10 In PPT   Seated SB Marches 2 x 10         Supine Clamshells 3 x 10 each Silver  One side moves at a time   Side-lying clamshells 3 x 10 each Silver   SLR Abduction 3 x 10 each    Bridge 3 x 10 Ball squeeze   Quadruped alternate UE reaches     Quadruped alternate LE reaches     Quadruped alternate UE/LE reaches     [employer] heel raises     Sit ups      planks     Tband lat pulls     Tband rows         Manual Intervention             Prone PA      Lumbar Manip      SI Manip Hip belt mobs      Hip LA distraction              Patient Education:   10/19/20: Patient educated about PT diagnosis, prognosis, and plan of care; educated on role of physical therapy; educated on HEP; educated on anatomy of lumbar spine / lumbopelvic positioning. HEP:  Patient instructed on HEP on this date with handout provided and all questions answered. Patient was instructed to contact PT with any complications or questions about HEP moving forward. Patient verbally stated she/he understood. Updated 11/2/20  [facility]: [id number]    Therapeutic Exercise and NMR EXR  [x] (62250) Provided verbal/tactile cueing for activities related to strengthening, flexibility, endurance, ROM  for improvements in proximal hip and core control with self care, mobility, lifting and ambulation. [x] (38016) Provided verbal/tactile cueing for activities related to improving balance, coordination, kinesthetic sense, posture, motor skill, proprioception  to assist with core control in self care, mobility, lifting, and ambulation.      Therapeutic Activities:    [x] (19614 or 67422) Provided verbal/tactile cueing for activities related to improving balance, coordination, kinesthetic sense, posture, motor skill, proprioception and motor activation to allow for proper function  with self care and ADLs  [] (45195) Provided training and instruction to the patient for proper core and proximal hip recruitment and positioning with ambulation re-education     Home Exercise Program:    [x] (54402) Reviewed/Progressed HEP activities related to strengthening, flexibility, endurance, ROM of core, proximal hip and LE for functional self-care, mobility, lifting and ambulation   [] (82447) Reviewed/Progressed HEP activities related to improving balance, coordination, kinesthetic sense, posture, motor skill, proprioception of core, proximal hip and LE for self care, mobility, lifting, and ambulation      Manual Treatments:  PROM / STM / Oscillations-Mobs:  G-I, II, III, IV (PA's, Inf., Post.)  [x] (68950) Provided manual therapy to mobilize proximal hip and LS spine soft tissue/joints for the purpose of modulating pain, promoting relaxation,  increasing ROM, reducing/eliminating soft tissue swelling/inflammation/restriction, improving soft tissue extensibility and allowing for proper ROM for normal function with self care, mobility, lifting and ambulation. Modalities:   Declined    Charges:  Timed Code Treatment Minutes: 42   Total Treatment Minutes: 72   [id number]    [] EVAL (LOW) 19113 (typically 20 minutes face-to-face)  [] EVAL (MOD) 91898 (typically 30 minutes face-to-face)  [] EVAL (HIGH) 00303 (typically 45 minutes face-to-face)  [] RE-EVAL     [x] KY(23786) x  2   [] IONTO  [x] NMR (48034) x  1   [] VASO  [] Manual (48548) x      [] Other:  [] TA x      [] Mech Traction (00221)  [] ES(attended) (62717)      [] ES (un) (22148):     GOALS:   Patient stated goal: Return to walking without pain    [] Progressing: [] Met: [] Not Met: [] Adjusted    Therapist goals for Patient:   Short Term Goals: To be achieved in: 2 weeks  1. Independent in HEP and progression per patient tolerance, in order to prevent re-injury. [] Progressing: [] Met: [] Not Met: [] Adjusted   2. Patient will have a decrease in pain to facilitate improvement in movement, function, and ADLs as indicated by Functional Deficits. [] Progressing: [] Met: [] Not Met: [] Adjusted    Long Term Goals: To be achieved in: 8 weeks  1. Disability index score of 19.5% or less for the [place] to assist with reaching prior level of function. [] Progressing: [] Met: [] Not Met: [] Adjusted  2. Patient will demonstrate increased AROM to WNL, good LS mobility, good hip ROM to allow for proper joint functioning as indicated by patients Functional Deficits. [] Progressing: [] Met: [] Not Met: [] Adjusted  3.  Patient will demonstrate an increase in Strength to good proximal hip Poor    Patient Requires Follow-up: [x] Yes  [] No    PLAN: See eval  [] Continue per plan of care [] Alter current plan (see comments)  [x] Plan of care initiated [] Hold pending MD visit [] Discharge    Electronically signed by: Deshawn Morris PT, DPT, OCS    *If patient does not return for further follow ups after this date. Please consider this as the patients discharge from physical therapy.

## 2020-11-05 ENCOUNTER — OFFICE VISIT (OUTPATIENT)
Dept: ORTHOPEDIC SURGERY | Age: 75
End: 2020-11-05
Payer: MEDICARE

## 2020-11-05 VITALS — HEIGHT: 61 IN | TEMPERATURE: 96 F | WEIGHT: 111.99 LBS | RESPIRATION RATE: 14 BRPM | BODY MASS INDEX: 21.14 KG/M2

## 2020-11-05 PROCEDURE — 1090F PRES/ABSN URINE INCON ASSESS: CPT | Performed by: PHYSICAL MEDICINE & REHABILITATION

## 2020-11-05 PROCEDURE — 3017F COLORECTAL CA SCREEN DOC REV: CPT | Performed by: PHYSICAL MEDICINE & REHABILITATION

## 2020-11-05 PROCEDURE — 99213 OFFICE O/P EST LOW 20 MIN: CPT | Performed by: PHYSICAL MEDICINE & REHABILITATION

## 2020-11-05 PROCEDURE — 1036F TOBACCO NON-USER: CPT | Performed by: PHYSICAL MEDICINE & REHABILITATION

## 2020-11-05 PROCEDURE — 1123F ACP DISCUSS/DSCN MKR DOCD: CPT | Performed by: PHYSICAL MEDICINE & REHABILITATION

## 2020-11-05 PROCEDURE — 4040F PNEUMOC VAC/ADMIN/RCVD: CPT | Performed by: PHYSICAL MEDICINE & REHABILITATION

## 2020-11-05 PROCEDURE — G8420 CALC BMI NORM PARAMETERS: HCPCS | Performed by: PHYSICAL MEDICINE & REHABILITATION

## 2020-11-05 PROCEDURE — G8400 PT W/DXA NO RESULTS DOC: HCPCS | Performed by: PHYSICAL MEDICINE & REHABILITATION

## 2020-11-05 PROCEDURE — G8484 FLU IMMUNIZE NO ADMIN: HCPCS | Performed by: PHYSICAL MEDICINE & REHABILITATION

## 2020-11-05 PROCEDURE — G8427 DOCREV CUR MEDS BY ELIG CLIN: HCPCS | Performed by: PHYSICAL MEDICINE & REHABILITATION

## 2020-11-05 NOTE — LETTER
Please schedule the following with:     Date:   20 @ 8:15    Account: [de-identified]  Patient: Dudley Vivar    :   Address:  [address] [address]    Phone (M):  [phone] (home)      ----------------------------------------------------------------------------------------------  Diagnosis:     ICD-10-CM    1. Sacroiliac joint dysfunction of left side  M53.3          Levels:left SI joint injection  CPT Codes 46385    ----------------------------------------------------------------------------------------------  Injection # 1   [facility]    Attending Physician       Jany Lyon MD.  ----------------------------------------------------------------------------------------------  Injection Scheduled For:    At:    1st Insurance [facility]    Pre-Cert#  2nd Insurance [facility]    Pre-Cert#    Comments or Special instructions:  COVID TEST: yes    · Infection control  · Tested positive for MRSA in past 12 months:  no  · Tested positive for MSSA \"staph infection\" in past 12 months: no  · Tested positive for VRE (Vancomycin Resistant Enterococci) in past 12 months:   no  · Currently on any antibiotics for an infection: no  · Anticoagulants:  · On a blood thinner:  no   · Any history of bleeding disorder: no   · Advanced Liver disease: no   · Advanced Renal disease: no   · Glaucoma: no   · Diabetes: no     Sedation:  No  -----------------------------------------------------------------------------------------------  No Known Allergies

## 2020-11-05 NOTE — PROGRESS NOTES
Follow up: Rebeca Schwartz  [birth year]  [id number]         Chief Complaint   Patient presents with    Lower Back Pain     [facility] PT 10/19-11/2/2020 (#5)    Rectal Pain     L BUTTOCKS pain w/ long walks. HISTORY OF PRESENT ILLNESS:  Ms. Bhavani Wiggins is a 70 y.o. female returns for a follow up visit for multiple medical problems. Her current presenting problems are   1. Sacroiliac joint dysfunction of left side    2. Adolescent idiopathic scoliosis of thoracolumbar region    3. Spondylolisthesis, lumbar region    4. Spondylosis of lumbar region without myelopathy or radiculopathy    . As per information/history obtained from the PADT(patient assessment and documentation tool) - She complains of pain in the lower back with radiation to the buttocks She rates the pain 4/10 and describes it as aching. Pain is made worse by: walking. She denies side effects from the current pain regimen. Patient reports that since the last follow up visit the physical functioning is unchanged, family/social relationships are unchanged, mood is unchanged and sleep patterns are unchanged, and that the overall functioning is worse. Patient denies neurological bowel or bladder. Associated signs and symptoms:   Neurogenic bowel or bladder symptoms:  no   Perceived weakness:  no   Difficulty walking:  yes            Past medical, surgical, social and family history reviewed with the patient.  No pertinent relevant history  Past Medical History:   Past Medical History:   Diagnosis Date    Arthritis     Environmental allergies     Histoplasmosis     right eye     Hyperlipidemia     Medical history reviewed with no changes     PONV (postoperative nausea and vomiting)       Past Surgical History:     Past Surgical History:   Procedure Laterality Date    CATARACT REMOVAL      MOHS SURGERY      nose- basal cell     TOTAL HIP ARTHROPLASTY Right 1/7/2019    RIGHT TOTAL HIP ARTHROPLASTY; PRP INJECTION OF RIGHT HIP performed by Mily Beyer MD at [address] [place] [address]       Current Medications:     Current Outpatient Medications:     Loratadine (CLARITIN PO), Take by mouth, Disp: , Rfl:     Calcium Carb-Cholecalciferol (CALCIUM 600+D3 PO), Take by mouth, Disp: , Rfl:     Cholecalciferol (VITAMIN D-3 PO), Take 1,000 Units by mouth, Disp: , Rfl:     Probiotic Product (ALIGN PO), Take by mouth, Disp: , Rfl:     Glycerin-Polysorbate 80 (REFRESH DRY EYE THERAPY OP), Apply to eye, Disp: , Rfl:     clindamycin (CLEOCIN) 150 MG capsule, Take 1 capsule by mouth See Admin Instructions for 10 doses Take 4 capsules 1 hour prior to any surgical procedure, Disp: 40 capsule, Rfl: 1    vitamin C (ASCORBIC ACID) 500 MG tablet, Take 1,000 mg by mouth daily, Disp: , Rfl:     simvastatin (ZOCOR) 20 MG tablet, Take 20 mg by mouth nightly , Disp: , Rfl:   Allergies:  Patient has no known allergies. Social History:    reports that she has never smoked. She has never used smokeless tobacco. She reports that she does not drink alcohol or use drugs. Family History:   Family History   Family history unknown: Yes       REVIEW OF SYSTEMS:   CONSTITUTIONAL: Denies unexplained weight loss, fevers, chills or fatigue  NEUROLOGICAL: Denies unsteady gait or progressive weakness  MUSCULOSKELETAL: Denies joint swelling or redness  GI: Denies nausea, vomiting, diarrhea   : Denies bowel or bladder issues       PHYSICAL EXAM:    Vitals: Temperature 96 °F (35.6 °C), resp. rate 14, height 5' 0.98\" (1.549 m), weight 111 lb 15.9 oz (50.8 kg). GENERAL EXAM:  · General Apparence: Patient is adequately groomed with no evidence of malnutrition. · Psychiatric: Orientation: The patient is oriented to time, place and person. The patient's mood and affect are appropriate   · Vascular: Examination reveals no swelling and palpation reveals no tenderness in upper or lower extremities. Good capillary refill.    · Sensation is intact without deficit in the upper and lower extremities to light touch and pinprick  · Coordination of the upper and lower extremities are normal.    LUMBAR/SACRAL EXAMINATION:  · Inspection: Local inspection shows no step-off or bruising. Lumbar alignment is normal. No instability is noted. · Palpation:   No evidence of tenderness at the midline. Lumbar paraspinal tenderness: No tenderness to palpation of the lumbar paraspinals  Bursal tenderness No tenderness bilaterally  There is no paraspinal spasm. · Range of Motion: limited by 25% in all planes due to pain  · Strength:   Strength testing is 5/5 in all muscle groups tested. · Special Tests:   Straight leg raise and crossed SLR negative. Harry's testing is + on left. · Skin: There are no rashes, ulcerations or lesions. · Reflexes: Reflexes are symmetrically 2+ at the patellar and ankle tendons. Clonus absent bilaterally at the feet. · Gait & station: normal, patient ambulates without assistance and no ataxia  · Additional Examinations:  · RIGHT LOWER EXTREMITY: Inspection/examination of the right lower extremity does not show any tenderness, deformity or injury. Range of motion is normal and pain-free. There is no gross instability. There are no rashes, ulcerations or lesions. Strength and tone are normal. No atrophy or abnormal movements are noted. · LEFT LOWER EXTREMITY:  Inspection/examination of the left lower extremity does not show any tenderness, deformity or injury. Range of motion is normal and pain-free. There is no gross instability. There are no rashes, ulcerations or lesions. Strength and tone are normal. No atrophy or abnormal movements are noted. Diagnostic Testing:    MR Lumbar spine shows       Multilevel degenerative changes as described. Findings appear exacerbated by scoliotic change. No severe central canal stenosis seen.  There is prominent foraminal narrowing seen at the L3-L4     through L5-S1 levels and correlation for L3-L5 radiculopathy    recommended as described. Results for orders placed or performed during the hospital encounter of 82/68/87   Basic Metabolic Panel   Result Value Ref Range    Sodium 136 136 - 145 mmol/L    Potassium 4.9 3.5 - 5.1 mmol/L    Chloride 101 99 - 110 mmol/L    CO2 28 21 - 32 mmol/L    Anion Gap 7 3 - 16    Glucose 135 (H) 70 - 99 mg/dL    BUN 10 7 - 20 mg/dL    CREATININE <0.5 (L) 0.6 - 1.2 mg/dL    GFR Non-African American >60 >60    GFR African American >60 >60    Calcium 9.0 8.3 - 10.6 mg/dL   Hemoglobin and hematocrit, blood   Result Value Ref Range    Hemoglobin 10.2 (L) 12.0 - 16.0 g/dL    Hematocrit 31.0 (L) 36.0 - 48.0 %   Basic Metabolic Panel   Result Value Ref Range    Sodium 136 136 - 145 mmol/L    Potassium 4.1 3.5 - 5.1 mmol/L    Chloride 100 99 - 110 mmol/L    CO2 28 21 - 32 mmol/L    Anion Gap 8 3 - 16    Glucose 96 70 - 99 mg/dL    BUN 10 7 - 20 mg/dL    CREATININE <0.5 (L) 0.6 - 1.2 mg/dL    GFR Non-African American >60 >60    GFR African American >60 >60    Calcium 9.0 8.3 - 10.6 mg/dL   Hemoglobin and hematocrit, blood   Result Value Ref Range    Hemoglobin 10.2 (L) 12.0 - 16.0 g/dL    Hematocrit 30.3 (L) 36.0 - 48.0 %     Impression:       1. Sacroiliac joint dysfunction of left side    2. Adolescent idiopathic scoliosis of thoracolumbar region    3. Spondylolisthesis, lumbar region    4. Spondylosis of lumbar region without myelopathy or radiculopathy        Plan:  Clinical Course: Above diagnoses are worsening    I discussed the diagnosis and the treatment options with Daniel Oakley today. In Summary:  The various treatment options were outlined and discussed with Daniel Oakley including:  Conservative care options: physical therapy, ice, medications, bracing, and activity modification. The indications for therapeutic injections. The indications for additional imaging/laboratory studies. The indications for (possible future) interventions.      After considering the various options discussed, Jasmina Montemayor elected to pursue a course of treatment that includes the followin. Medications:  No further recommendations for new medications. 2. PT:  Encouraged to continue with Home exercise program.    3. Further studies: COVID-19 PCR    4. Interventional:  We discussed pursuing a Left Sacroiliac joint injection as SI joint pathology is suspected as the etiology of the chronic low back/hip pain. There has been failure of non-invasive and conservative treatment including physical therapy/home exercise program, rest, NSAIDs or acetaminophen. Risks include but are not limited to bleeding, infection, nerve injury, increase in pain and lack of pain relief. The patient verbalized understanding and would like to proceed. 5. Follow up:  2-3 weeks      Jasmina Montemayor was instructed to call the office if her symptoms worsen or if new symptoms appear prior to the next scheduled visit. She is specifically instructed to contact the office between now & her scheduled appointment if she has concerns related to her condition or if she needs assistance in scheduling the above tests. She is welcome to call for an appointment sooner if she has any additional concerns or questions. Lian Griffin. Chasity Atkins MD, SHEL[facility], [facility]  Board Certified in [address] [place] [address]  Certified and Fellowship Trained in [facility] ([facility])             This dictation was performed with a verbal recognition program [facility]) and it was checked for errors. It is possible that there are still dictated errors within this office note. If so, please bring any errors to my attention for an addendum. All efforts were made to ensure that this office note is accurate.

## 2020-11-13 ENCOUNTER — HOSPITAL ENCOUNTER (OUTPATIENT)
Dept: PHYSICAL THERAPY | Age: 75
Setting detail: THERAPIES SERIES
Discharge: HOME | End: 2020-11-13
Payer: MEDICARE

## 2020-11-13 PROCEDURE — 97110 THERAPEUTIC EXERCISES: CPT

## 2020-11-13 PROCEDURE — 97112 NEUROMUSCULAR REEDUCATION: CPT

## 2020-11-13 NOTE — FLOWSHEET NOTE
[facility]    Physical Therapy Daily Treatment Note  Date:  2020    Patient Name:  Celi Kang   \"\"  :    MRN: [id number]  Restrictions/Precautions:    Medical/Treatment Diagnosis Information:  · Diagnosis: M43.16 (ICD-10-CM) - Spondylolisthesis, lumbar region  · Treatment Diagnosis: M54.5 Lumbar Pain; M51.35 DDD  Insurance/Certification information:  PT Insurance Information: Medicare  Physician Information:  Referring Practitioner: Thomas Conroy  Has the plan of care been signed (Y/N):        [x]  Yes  []  No     Date of Patient follow up with Physician: 20      Is this a Progress Report:     []  Yes  [x]  No        If Yes:  Date Range for reporting period:  Beginning  Ending    Progress report will be due (10 Rx or 30 days whichever is less):        Recertification will be due (POC Duration  / 90 days whichever is less): 20         Visit # Insurance Allowable Auth Required   6 Medicare []  Yes [x]  No        OUTCOME MEASURE DATE DEFICIT   QUEBEC 10/19/20 IE 39% Deficit        Patient given satisfaction survey? [] Yes   [] No    Latex Allergy:  [x]NO      []YES  Preferred Language for Healthcare:   [x]English       []other:    Pain level:  1/10 low back, 2/10 left glute while walking     SUBJECTIVE:  Doing well today. Just returned from trip to [place] to see her sister. Symptoms about the same. No significant pain at rest and mild increase to 3-4/10 in left glute when walking. Is concerned about getting SIJ injection with MD next month, unsure if she wants to get it done especially with COVID-19 outbreak. Also continues to have pain with neck but MD said she will look at this at next visit.       OBJECTIVE:     10/19/20  ROM   Comments   Trunk flexion Full - no pain Rib hump on R   Trunk extension 25% limited - painful General Lumbar pain    Trunk R sidebend 2\" above knee Deviates into flexion   Trunk L sidebend 2\" above knee     Trunk R rotation       Trunk L rotation       HS flexibility -30 degrees bilaterally 90/90   Quad flexibilty 90 degrees bilaterally Elys              10/19/20  Strength Left Right Comments   Hip flexion(L2) 4 4     Knee extension(L3) 5 5     Knee flexion(S1-2) 5 5     Ankle dorsiflexion(L4) 5 5     Toe extension(L5) NT NT     Ankle eversion/plantar flexion(S1) 5 5     Hip abd  3+ 3+         10/19/20  Special tests   Comments   SLR Neg Just HS tightness   Slump test Pos - mild sciatic nerve hypomobility bilaterally     Pelvic symmetry  Neg     Segmental Spinal mobility Mild hypomobility L4 and L5 PAs     Heel walk NT     Toe walk NT     Sensation Intact LE dermatomes               11/13/20:  SIJ   Laslett Criteria   (3+ variables: 91-94% sen, 78-87% spec)    -SI distraction  - SI compression  - Thigh thrust  - Gaenslen's  - Sacral thrust    Yady Locke javier Wurff  (3+ variables: 85% sen, 79% spec)    -SI distraction  - SI compression  - Thigh thrust  - Gaenslen's  +/- JAMES: initially patient reported pain on left SIJ; later in session described lateral hip tightness without SIJ pain.          10/19/20 Deferred  DTRs Left Right Comments   Patellar(L3-L4)         Achilles(S1-S2)                      Joint mobility:               []? Normal               [x]? Hypo: decreased PAs L4 and L5                []? Hyper     Palpation: increased muscle tension throughout R and L Lumbar paraspinals; TTP along right QL     Functional Mobility/Transfers: ind     Posture: anteriorly rotated pelvis ; mild scoliosis with right rib hump     Bandages/Dressings/Incisions: n/a     Gait: (include devices/WB status) [facility];        RESTRICTIONS/PRECAUTIONS: HLD (medicated);  Histoplasmosis (difficulty seeing out of R eye); has hearing aids both sides    Exercises/Interventions:   ROM/stretches     SKTC 3 x 30\" each    Prone quad stretch 3 x 30\" each Pillow under pelvis        Supine HS 3 x 30\" each With strap      Hook lying rotation 10 x 10\"    Figure 4 3 x 30\" each Push away  Pull to chest   Self STM 3' Seated  On lax ball   Strengthening     Alt Heel Slides 3 x 10 In PPT   Alt LE March / UE Raise 3 x 10 In PPT   Seated SB Marches 2 x 10         Supine Clamshells 3 x 10 each Silver  One side moves at a time   Side-lying clamshells 3 x 10 each Silver   SLR Abduction 3 x 10 each    Bridge 3 x 10 Silver loop at knees   Quadruped alternate UE reaches     Quadruped alternate LE reaches     Quadruped alternate UE/LE reaches     Quu heel raises     Sit ups      planks     Tband lat pulls     Tband rows         Manual Intervention             Prone PA      Lumbar Manip      SI Manip      Hip belt mobs      Hip LA distraction              Patient Education:   10/19/20: Patient educated about PT diagnosis, prognosis, and plan of care; educated on role of physical therapy; educated on HEP; educated on anatomy of lumbar spine / lumbopelvic positioning. HEP:  Patient instructed on HEP on this date with handout provided and all questions answered. Patient was instructed to contact PT with any complications or questions about HEP moving forward. Patient verbally stated she/he understood. Updated 11/2/20  [employer] CODE: [id number]    Therapeutic Exercise and NMR EXR  [x] (04915) Provided verbal/tactile cueing for activities related to strengthening, flexibility, endurance, ROM  for improvements in proximal hip and core control with self care, mobility, lifting and ambulation. [x] (65945) Provided verbal/tactile cueing for activities related to improving balance, coordination, kinesthetic sense, posture, motor skill, proprioception  to assist with core control in self care, mobility, lifting, and ambulation.      Therapeutic Activities:    [x] (97018 or 66218) Provided verbal/tactile cueing for activities related to improving balance, coordination, kinesthetic sense, posture, motor skill, proprioception and motor activation to allow for proper function  with self care and ADLs  [] (05620) Provided training and instruction to the patient for proper core and proximal hip recruitment and positioning with ambulation re-education     Home Exercise Program:    [x] (30379) Reviewed/Progressed HEP activities related to strengthening, flexibility, endurance, ROM of core, proximal hip and LE for functional self-care, mobility, lifting and ambulation   [] (01683) Reviewed/Progressed HEP activities related to improving balance, coordination, kinesthetic sense, posture, motor skill, proprioception of core, proximal hip and LE for self care, mobility, lifting, and ambulation      Manual Treatments:  PROM / STM / Oscillations-Mobs:  G-I, II, III, IV (PA's, Inf., Post.)  [x] (99071) Provided manual therapy to mobilize proximal hip and LS spine soft tissue/joints for the purpose of modulating pain, promoting relaxation,  increasing ROM, reducing/eliminating soft tissue swelling/inflammation/restriction, improving soft tissue extensibility and allowing for proper ROM for normal function with self care, mobility, lifting and ambulation. Modalities:   Declined    Charges:  Timed Code Treatment Minutes: 50   Total Treatment Minutes: 50   1245pm-143pm    [] EVAL (LOW) 22216 (typically 20 minutes face-to-face)  [] EVAL (MOD) 15414 (typically 30 minutes face-to-face)  [] EVAL (HIGH) 07628 (typically 45 minutes face-to-face)  [] RE-EVAL     [x] WX(79325) x  2   [] IONTO  [x] NMR (51721) x  1   [] VASO  [] Manual (34791) x      [] Other:  [] TA x      [] Mech Traction (92725)  [] ES(attended) (70435)      [] ES (un) (10932):     GOALS:   Patient stated goal: Return to walking without pain    [] Progressing: [] Met: [] Not Met: [] Adjusted    Therapist goals for Patient:   Short Term Goals: To be achieved in: 2 weeks  1. Independent in HEP and progression per patient tolerance, in order to prevent re-injury. [] Progressing: [] Met: [] Not Met: [] Adjusted   2. Patient will have a decrease in pain to facilitate improvement in movement, function, and ADLs as indicated by Functional Deficits. [] Progressing: [] Met: [] Not Met: [] Adjusted    Long Term Goals: To be achieved in: 8 weeks  1. Disability index score of 19.5% or less for the [place] to assist with reaching prior level of function. [] Progressing: [] Met: [] Not Met: [] Adjusted  2. Patient will demonstrate increased AROM to WNL, good LS mobility, good hip ROM to allow for proper joint functioning as indicated by patients Functional Deficits. [] Progressing: [] Met: [] Not Met: [] Adjusted  3. Patient will demonstrate an increase in Strength to good proximal hip and core activation to allow for proper functional mobility as indicated by patients Functional Deficits. [] Progressing: [] Met: [] Not Met: [] Adjusted  4. Patient will return to ADLs and other functional activities without increased symptoms or restriction. [] Progressing: [] Met: [] Not Met: [] Adjusted  5. Patient will return to walking for >45 minutes without pain in lumbar spine / left gluteal region (patient specific functional goal)    [] Progressing: [] Met: [] Not Met: [] Adjusted     Overall Progression Towards Functional goals/ Treatment Progress Update:  [] Patient is progressing as expected towards functional goals listed. [] Progression is slowed due to complexities/Impairments listed. [] Progression has been slowed due to co-morbidities.   [x] Plan just implemented, too soon to assess goals progression <30days   [] Goals require adjustment due to lack of progress  [] Patient is not progressing as expected and requires additional follow up with physician  [] Other    Prognosis for POC: [x] Good [] Fair  [] Poor      Patient requires continued skilled intervention: [x] Yes  [] No    Treatment/Activity Tolerance:  [x] Patient able to complete treatment  [] Patient limited by fatigue  [] Patient limited by pain     [] Patient limited by other medical complications  [x] Other: Patient concerned today about SI Joint injection. Discussed various treatment options in length and role of cortisone injections to SI joint. At today's visit, all SI joint special tests and provacative tests were negative on left side. Patient remains TTP at piriformis muscle and gluteal region on left side so added seated STM on lax ball and encoruaged this for home. Significant hip weakness remains and patient compensates during SLR abduction still with hip hike despite verbal and tactile cueing. Continue PT to improve patient's BLE flexibility, strength, and core strength in order to reduce pain in back and return her to PLOF. Prognosis: [] Good [x] Fair  [] Poor    Patient Requires Follow-up: [x] Yes  [] No    PLAN: See eval  [] Continue per plan of care [] Alter current plan (see comments)  [x] Plan of care initiated [] Hold pending MD visit [] Discharge    Electronically signed by: Joseph Dolan PT, DPT, OCS    *If patient does not return for further follow ups after this date. Please consider this as the patients discharge from physical therapy.

## 2020-11-16 ENCOUNTER — HOSPITAL ENCOUNTER (OUTPATIENT)
Dept: PHYSICAL THERAPY | Age: 75
Setting detail: THERAPIES SERIES
Discharge: HOME | End: 2020-11-16
Payer: MEDICARE

## 2020-11-16 ENCOUNTER — TELEPHONE (OUTPATIENT)
Dept: ORTHOPEDIC SURGERY | Age: 75
End: 2020-11-16

## 2020-11-16 PROCEDURE — 97110 THERAPEUTIC EXERCISES: CPT

## 2020-11-16 PROCEDURE — 97530 THERAPEUTIC ACTIVITIES: CPT

## 2020-11-16 PROCEDURE — 97112 NEUROMUSCULAR REEDUCATION: CPT

## 2020-11-16 NOTE — PROGRESS NOTES
[facility]   Physical Therapy Progress Note        Overall Response to Treatment:   []Patient is responding well to treatment and improvement is noted with regards  to goals   []Patient should continue to improve in reasonable time if they continue HEP   []Patient has plateaued and is no longer responding to skilled PT intervention    []Patient is getting worse and would benefit from return to referring MD   []Patient unable to adhere to initial POC   [x]Other: Progressing well overall now one month into therapy. Has relieved all pain in right low back / hip that she had one month ago and has reduced the intensity of the pain in left low back/hip. Continues to have significant deficits in glute strength bilaterally although it has improved some to date. Has poor glute activation and tends to compensate with hamstrings causing over fatigue and cramping. Also has significant tightness in rectus femoris muscle causing increased lordosis  And stress/pain in lumbar spine. Requires extensive cueing to perform PPT before quad stretch in prone in order to alleviate low back pain. Recommend continued PT 1-2x/week for another month to address these deficits and try to eliminate pain in low  back and return patient to pain-free walking and PLOF.         Date range of Visits: 10/19/20-20  Total Visits: 7    Physical Therapy Daily Treatment Note  Date:  2020    Patient Name:  Hector Douglas   \"\"  :    MRN: [id number]  Restrictions/Precautions:    Medical/Treatment Diagnosis Information:  · Diagnosis: M43.16 (ICD-10-CM) - Spondylolisthesis, lumbar region  · Treatment Diagnosis: M54.5 Lumbar Pain; M51.35 DDD  Insurance/Certification information:  PT Insurance Information: Medicare  Physician Information:  Referring Practitioner: Sammy Navarro  Has the plan of care been signed (Y/N):        [x]  Yes  []  No     Date of Patient follow up with Physician: 20      Is this a Progress Report:     [x]  Yes  [x]  No        If Yes:  Date Range for reporting period:  Beginning: 10/19/20  Endin20    Progress report will be due (10 Rx or 30 days whichever is less): /62      Recertification will be due (POC Duration  / 90 days whichever is less): 20         Visit # Insurance Allowable Auth Required   7 Medicare []  Yes [x]  No        OUTCOME MEASURE DATE DEFICIT   QUEBEC 10/19/20 IE 39% Deficit   QUEBEC 20 20% Deficit   Patient given satisfaction survey? [] Yes   [] No    Latex Allergy:  [x]NO      []YES  Preferred Language for Healthcare:   [x]English       []other:    Pain level:  1/10 at rest  2-3/10 at worst in past week    SUBJECTIVE:  Doing well today. Felt good after PT Friday - has been doing self STM at home over left glute/piriformis and thinks this is helping. Very mild left lumbar and gluteal pain at rest today but no pain on right side of back or hip. No longer has pain in right low back or grabbing sensation that she had when starting her PT a month ago. OBJECTIVE:     20  ROM   Comments   Trunk flexion Full - no pain Rib hump on R   Trunk extension 25% limited - painful General Lumbar pain    Trunk R sidebend Knee joint line Deviates into flexion; feels tightness on L side   Trunk L sidebend Knee join line Deviates into flexion;   Feels tightness on R side   Trunk R rotation       Trunk L rotation       HS flexibility -15 degrees bilaterally 90/90   Quad flexibilty 90 degrees bilaterally Elys; limited by back pain               20  Strength Left Right Comments   Hip flexion(L2) 4+ 4+     Knee extension(L3) 5 5     Knee flexion(S1-2) 5 5     Ankle dorsiflexion(L4) 5 5     Toe extension(L5) NT NT     Ankle eversion/plantar flexion(S1) 5 5     Hip abd  4- 4-      Hip IR 4* 4* *mild lateral hip pain   Hip ER 4 4+       10/19/20  Special tests   Comments   SLR Neg Just HS tightness   Slump test Pos - mild sciatic nerve hypomobility bilaterally     Pelvic symmetry  Neg     Segmental Spinal mobility Mild hypomobility L4 and L5 PAs     Heel walk NT     Toe walk NT     Sensation Intact LE dermatomes               11/13/20:  SIJ   Laslett Criteria   (3+ variables: 91-94% sen, 78-87% spec)  -SI distraction  - SI compression  - Thigh thrust  - Gaenslen's  - Sacral thrust    Romario Larios javier Wurff  (3+ variables: 85% sen, 79% spec)  -SI distraction  - SI compression  - Thigh thrust  - Gaenslen's  +/- JAMES: initially patient reported pain on left SIJ; later in session described lateral hip tightness without SIJ pain.          10/19/20 Deferred  DTRs Left Right Comments   Patellar(L3-L4)         Achilles(S1-S2)                      Joint mobility:               []? Normal               [x]? Hypo: decreased PAs L4 and L5                []? Hyper     Palpation: increased muscle tension throughout R and L Lumbar paraspinals; TTP along right QL     Functional Mobility/Transfers: ind     Posture: anteriorly rotated pelvis ; mild scoliosis with right rib hump     Bandages/Dressings/Incisions: n/a     Gait: (include devices/WB status) [facility];        RESTRICTIONS/PRECAUTIONS: HLD (medicated);  Histoplasmosis (difficulty seeing out of R eye); has hearing aids both sides    Exercises/Interventions:   ROM/stretches     Groin pain on R   Prone quad stretch 3 x 30\" each Pillow under pelvis;   Cues to PPT   Tall kneeling hip flexor stretch 3 x 30\" L Only Kneeling On Airex Pad   HEPHEP   Hook lying rotation 10 x 10\"    Figure 4 3 x 30\" L Only KTOS   Self STM 3' Seated  On lax ball   Strengthening     HEPHEPAlt Heel Slides 3 x 10 In PPT        Supine Clamshells 3 x 10 each Silver  One side moves at a time   Side-lying clamshells 3 x 10 each Silver   SLR Abduction 3 x 10 each    Bridge 3 x 10 Silver loop at knees  Cues for glute focus   Hip Ext 3 x 10 each Off edge of table; knee flexed        Quadruped alternate UE reaches     Quadruped alternate LE reaches Quadruped alternate UE/LE reaches     Conferensum heel raises     Sit ups      planks     Tband lat pulls     Tband rows         Manual Intervention             Prone PA      Lumbar Manip      SI Manip      Hip belt mobs      Hip LA distraction              Patient Education:   10/19/20: Patient educated about PT diagnosis, prognosis, and plan of care; educated on role of physical therapy; educated on HEP; educated on anatomy of lumbar spine / lumbopelvic positioning. HEP:  Patient instructed on HEP on this date with handout provided and all questions answered. Patient was instructed to contact PT with any complications or questions about HEP moving forward. Patient verbally stated she/he understood. Updated 11/16/20  [employer] CODE: [id number]    Therapeutic Exercise and NMR EXR  [x] (76336) Provided verbal/tactile cueing for activities related to strengthening, flexibility, endurance, ROM  for improvements in proximal hip and core control with self care, mobility, lifting and ambulation. [x] (83798) Provided verbal/tactile cueing for activities related to improving balance, coordination, kinesthetic sense, posture, motor skill, proprioception  to assist with core control in self care, mobility, lifting, and ambulation.      Therapeutic Activities:    [x] (91511 or 87163) Provided verbal/tactile cueing for activities related to improving balance, coordination, kinesthetic sense, posture, motor skill, proprioception and motor activation to allow for proper function  with self care and ADLs  [] (92812) Provided training and instruction to the patient for proper core and proximal hip recruitment and positioning with ambulation re-education     Home Exercise Program:    [x] (27560) Reviewed/Progressed HEP activities related to strengthening, flexibility, endurance, ROM of core, proximal hip and LE for functional self-care, mobility, lifting and ambulation   [] (20112) Reviewed/Progressed HEP activities related to improving balance, coordination, kinesthetic sense, posture, motor skill, proprioception of core, proximal hip and LE for self care, mobility, lifting, and ambulation      Manual Treatments:  PROM / STM / Oscillations-Mobs:  G-I, II, III, IV (PA's, Inf., Post.)  [x] (99260) Provided manual therapy to mobilize proximal hip and LS spine soft tissue/joints for the purpose of modulating pain, promoting relaxation,  increasing ROM, reducing/eliminating soft tissue swelling/inflammation/restriction, improving soft tissue extensibility and allowing for proper ROM for normal function with self care, mobility, lifting and ambulation. Modalities:   Declined    Charges:  Timed Code Treatment Minutes: 60   Total Treatment Minutes: 65   [id number]    [] EVAL (LOW) 89711 (typically 20 minutes face-to-face)  [] EVAL (MOD) 59425 (typically 30 minutes face-to-face)  [] EVAL (HIGH) 93551 (typically 45 minutes face-to-face)  [] RE-EVAL     [x] OC(92778) x  2   [] IONTO  [x] NMR (86712) x  1   [] VASO  [] Manual (82123) x      [] Other:  [x] TA x 1     [] Mech Traction (18375)  [] ES(attended) (58038)      [] ES (un) (85400):     GOALS:   Patient stated goal: Return to walking without pain    [x] Progressing: [] Met: [] Not Met: [] Adjusted    Therapist goals for Patient:   Short Term Goals: To be achieved in: 2 weeks  1. Independent in HEP and progression per patient tolerance, in order to prevent re-injury. [] Progressing: [x] Met: [] Not Met: [] Adjusted   2. Patient will have a decrease in pain to facilitate improvement in movement, function, and ADLs as indicated by Functional Deficits. [] Progressing: [x] Met: [] Not Met: [] Adjusted    Long Term Goals: To be achieved in: 8 weeks  1. Disability index score of 19.5% or less for the [facility] [place] to assist with reaching prior level of function. [x] Progressing: [] Met: [] Not Met: [] Adjusted  2.  Patient will demonstrate increased AROM to WNL, good LS mobility, good hip ROM to allow for proper joint functioning as indicated by patients Functional Deficits. [x] Progressing: [] Met: [] Not Met: [] Adjusted  3. Patient will demonstrate an increase in Strength to good proximal hip and core activation to allow for proper functional mobility as indicated by patients Functional Deficits. [x] Progressing: [] Met: [] Not Met: [] Adjusted  4. Patient will return to ADLs and other functional activities without increased symptoms or restriction. [x] Progressing: [] Met: [] Not Met: [] Adjusted  5. Patient will return to walking for >45 minutes without pain in lumbar spine / left gluteal region (patient specific functional goal)    [x] Progressing: [] Met: [] Not Met: [] Adjusted     Overall Progression Towards Functional goals/ Treatment Progress Update:  [x] Patient is progressing as expected towards functional goals listed. [] Progression is slowed due to complexities/Impairments listed. [] Progression has been slowed due to co-morbidities. [] Plan just implemented, too soon to assess goals progression <30days   [] Goals require adjustment due to lack of progress  [] Patient is not progressing as expected and requires additional follow up with physician  [] Other    Prognosis for POC: [x] Good [] Fair  [] Poor      Patient requires continued skilled intervention: [x] Yes  [] No    Treatment/Activity Tolerance:  [x] Patient able to complete treatment  [] Patient limited by fatigue  [] Patient limited by pain     [] Patient limited by other medical complications  [x] Other: See above. Continue PT to improve patient's BLE flexibility, strength, and core strength in order to reduce pain in back and return her to PLOF.      Prognosis: [] Good [x] Fair  [] Poor    Patient Requires Follow-up: [x] Yes  [] No    PLAN: See eval  [x] Continue per plan of care [] Alter current plan (see comments)  [] Plan of care initiated [] Hold pending MD visit [] Discharge    Electronically signed

## 2020-11-16 NOTE — TELEPHONE ENCOUNTER
Surgery and/or Procedure Scheduling     Contact Name: Lizy Stewart Request: Cancel injection for 12/1  Patient Contact Number: [phone]

## 2020-11-18 ENCOUNTER — HOSPITAL ENCOUNTER (OUTPATIENT)
Dept: PHYSICAL THERAPY | Age: 75
Setting detail: THERAPIES SERIES
Discharge: HOME | End: 2020-11-18
Payer: MEDICARE

## 2020-11-18 PROCEDURE — 97530 THERAPEUTIC ACTIVITIES: CPT

## 2020-11-18 PROCEDURE — 97110 THERAPEUTIC EXERCISES: CPT

## 2020-11-18 PROCEDURE — 97112 NEUROMUSCULAR REEDUCATION: CPT

## 2020-11-18 NOTE — FLOWSHEET NOTE
[facility]      Physical Therapy Daily Treatment Note  Date:  2020    Patient Name:  Jasmina Montemayor   \"\"  :    MRN: [id number]  Restrictions/Precautions:    Medical/Treatment Diagnosis Information:  · Diagnosis: M43.16 (ICD-10-CM) - Spondylolisthesis, lumbar region  · Treatment Diagnosis: M54.5 Lumbar Pain; M51.35 DDD  Insurance/Certification information:  PT Insurance Information: Medicare  Physician Information:  Referring Practitioner: Mike Mendoza  Has the plan of care been signed (Y/N):        [x]  Yes  []  No     Date of Patient follow up with Physician: 20      Is this a Progress Report:     []  Yes  [x]  No        If Yes:  Date Range for reporting period:  Beginning:   Ending:     Progress report will be due (10 Rx or 30 days whichever is less):       Recertification will be due (POC Duration  / 90 days whichever is less): 20         Visit # Insurance Allowable Auth Required   8 Medicare []  Yes [x]  No        OUTCOME MEASURE DATE DEFICIT   QUEBEC 10/19/20 IE 39% Deficit   QUEBEC 20 20% Deficit   Patient given satisfaction survey? [] Yes   [] No    Latex Allergy:  [x]NO      []YES  Preferred Language for Healthcare:   [x]English       []other:    Pain level:  1/10 at rest  2-3/10 at worst in past week    SUBJECTIVE:  Had knee pain / soreness from kneeling hip flexor stretch on Monday so she has not done this at home. Thinks self STM in glute/piriformis with lax ball has been helping. OBJECTIVE:     20  ROM   Comments   Trunk flexion Full - no pain Rib hump on R   Trunk extension 25% limited - painful General Lumbar pain    Trunk R sidebend Knee joint line Deviates into flexion; feels tightness on L side   Trunk L sidebend Knee join line Deviates into flexion;   Feels tightness on R side   Trunk R rotation       Trunk L rotation       HS flexibility -15 degrees bilaterally 90/90   Quad flexibilty lax ball   Strengthening     HEPHEPAlt Heel Slides 3 x 10 In PPT        Glute Isometrics 10 x 10\" Together  10 x 10\" Alt Relax    Supine Clamshells 3 x 10 each Silver  One side moves at a time   Side-lying clamshells 3 x 10 each Silver   SLR Abduction 3 x 10 each    Bridge 3 x 10 Silver loop at knees  Cues for glute focus   Hip Ext 3 x 10 each Off edge of table; knee flexed        Quadruped alternate UE reaches     Quadruped alternate LE reaches     Quadruped alternate UE/LE reaches          Leg Press 3 x 10 DL RANGE: 80-10, 80#  Back rest inclined                           Manual Intervention             Prone PA      Lumbar Manip      SI Manip      Hip belt mobs      Hip LA distraction              Patient Education:   10/19/20: Patient educated about PT diagnosis, prognosis, and plan of care; educated on role of physical therapy; educated on HEP; educated on anatomy of lumbar spine / lumbopelvic positioning. HEP:  Patient instructed on HEP on this date with handout provided and all questions answered. Patient was instructed to contact PT with any complications or questions about HEP moving forward. Patient verbally stated she/he understood. Updated 11/16/20  [employer] CODE: [id number]    Therapeutic Exercise and NMR EXR  [x] (35519) Provided verbal/tactile cueing for activities related to strengthening, flexibility, endurance, ROM  for improvements in proximal hip and core control with self care, mobility, lifting and ambulation. [x] (23675) Provided verbal/tactile cueing for activities related to improving balance, coordination, kinesthetic sense, posture, motor skill, proprioception  to assist with core control in self care, mobility, lifting, and ambulation.      Therapeutic Activities:    [x] (41095 or 47802) Provided verbal/tactile cueing for activities related to improving balance, coordination, kinesthetic sense, posture, motor skill, proprioception and motor activation to allow for proper function  with self care and ADLs  [] (10019) Provided training and instruction to the patient for proper core and proximal hip recruitment and positioning with ambulation re-education     Home Exercise Program:    [x] (39179) Reviewed/Progressed HEP activities related to strengthening, flexibility, endurance, ROM of core, proximal hip and LE for functional self-care, mobility, lifting and ambulation   [] (20941) Reviewed/Progressed HEP activities related to improving balance, coordination, kinesthetic sense, posture, motor skill, proprioception of core, proximal hip and LE for self care, mobility, lifting, and ambulation      Manual Treatments:  PROM / STM / Oscillations-Mobs:  G-I, II, III, IV (PA's, Inf., Post.)  [x] (58854) Provided manual therapy to mobilize proximal hip and LS spine soft tissue/joints for the purpose of modulating pain, promoting relaxation,  increasing ROM, reducing/eliminating soft tissue swelling/inflammation/restriction, improving soft tissue extensibility and allowing for proper ROM for normal function with self care, mobility, lifting and ambulation. Modalities:   Declined    Charges:  Timed Code Treatment Minutes: 40   Total Treatment Minutes: 40   1200pm-1242pm    [] EVAL (LOW) 94037 (typically 20 minutes face-to-face)  [] EVAL (MOD) 40049 (typically 30 minutes face-to-face)  [] EVAL (HIGH) 69716 (typically 45 minutes face-to-face)  [] RE-EVAL     [x] SY(00102) x  1   [] IONTO  [x] NMR (25344) x  1   [] VASO  [] Manual (27114) x      [] Other:  [x] TA x 1     [] Mech Traction (97966)  [] ES(attended) (13361)      [] ES (un) (32393):     GOALS:   Patient stated goal: Return to walking without pain    [x] Progressing: [] Met: [] Not Met: [] Adjusted    Therapist goals for Patient:   Short Term Goals: To be achieved in: 2 weeks  1. Independent in HEP and progression per patient tolerance, in order to prevent re-injury. [] Progressing: [x] Met: [] Not Met: [] Adjusted   2.  Patient will have a decrease in pain to facilitate improvement in movement, function, and ADLs as indicated by Functional Deficits. [] Progressing: [x] Met: [] Not Met: [] Adjusted    Long Term Goals: To be achieved in: 8 weeks  1. Disability index score of 19.5% or less for the [place] to assist with reaching prior level of function. [x] Progressing: [] Met: [] Not Met: [] Adjusted  2. Patient will demonstrate increased AROM to WNL, good LS mobility, good hip ROM to allow for proper joint functioning as indicated by patients Functional Deficits. [x] Progressing: [] Met: [] Not Met: [] Adjusted  3. Patient will demonstrate an increase in Strength to good proximal hip and core activation to allow for proper functional mobility as indicated by patients Functional Deficits. [x] Progressing: [] Met: [] Not Met: [] Adjusted  4. Patient will return to ADLs and other functional activities without increased symptoms or restriction. [x] Progressing: [] Met: [] Not Met: [] Adjusted  5. Patient will return to walking for >45 minutes without pain in lumbar spine / left gluteal region (patient specific functional goal)    [x] Progressing: [] Met: [] Not Met: [] Adjusted     Overall Progression Towards Functional goals/ Treatment Progress Update:  [x] Patient is progressing as expected towards functional goals listed. [] Progression is slowed due to complexities/Impairments listed. [] Progression has been slowed due to co-morbidities.   [] Plan just implemented, too soon to assess goals progression <30days   [] Goals require adjustment due to lack of progress  [] Patient is not progressing as expected and requires additional follow up with physician  [] Other    Prognosis for POC: [x] Good [] Fair  [] Poor      Patient requires continued skilled intervention: [x] Yes  [] No    Treatment/Activity Tolerance:  [x] Patient able to complete treatment  [] Patient limited by fatigue  [] Patient limited by pain     [] Patient limited by other medical complications  [x] Other: Tolerated visit well today. Had significant difficulty isolating glute activation when trying to relax one side at a time. This shows her poor neuromuscular control of glute muscles at present likely leading to overuse of hamstrings and  Low back extensors with activities like walking. Had better technique with side-lying hip abduction today on left side with no hip hiking noted but right side still requires self-stabilization with hand to prevent. Continue PT to improve patient's BLE flexibility, strength, and core strength in order to reduce pain in back and return her to PLOF. Prognosis: [] Good [x] Fair  [] Poor    Patient Requires Follow-up: [x] Yes  [] No    PLAN: See eval  [x] Continue per plan of care [] Alter current plan (see comments)  [] Plan of care initiated [] Hold pending MD visit [] Discharge    Electronically signed by: Lawyer Cortes PT, DPT, OCS    *If patient does not return for further follow ups after this date. Please consider this as the patients discharge from physical therapy.

## 2020-11-23 ENCOUNTER — HOSPITAL ENCOUNTER (OUTPATIENT)
Dept: PHYSICAL THERAPY | Age: 75
Setting detail: THERAPIES SERIES
Discharge: HOME | End: 2020-11-23
Payer: MEDICARE

## 2020-11-23 PROCEDURE — 97112 NEUROMUSCULAR REEDUCATION: CPT

## 2020-11-23 PROCEDURE — 97110 THERAPEUTIC EXERCISES: CPT

## 2020-11-23 PROCEDURE — 97530 THERAPEUTIC ACTIVITIES: CPT

## 2020-11-23 NOTE — FLOWSHEET NOTE
rotation       Trunk L rotation       HS flexibility -15 degrees bilaterally 90/90   Quad flexibilty 90 degrees bilaterally E[place]; limited by back pain               11/16/20  Strength Left Right Comments   Hip flexion(L2) 4+ 4+     Knee extension(L3) 5 5     Knee flexion(S1-2) 5 5     Ankle dorsiflexion(L4) 5 5     Toe extension(L5) NT NT     Ankle eversion/plantar flexion(S1) 5 5     Hip abd  4- 4-      Hip IR 4* 4* *mild lateral hip pain   Hip ER 4 4+       10/19/20  Special tests   Comments   SLR Neg Just HS tightness   Slump test Pos - mild sciatic nerve hypomobility bilaterally     Pelvic symmetry  Neg     Segmental Spinal mobility Mild hypomobility L4 and L5 PAs     Heel walk NT     Toe walk NT     Sensation Intact LE dermatomes               11/13/20:  SIJ   Laslett Criteria   (3+ variables: 91-94% sen, 78-87% spec)  -SI distraction  - SI compression  - Thigh thrust  - Gaenslen's  - Sacral thrust    Yady Locke javier Wuf  (3+ variables: 85% sen, 79% spec)  -SI distraction  - SI compression  - Thigh thrust  - Gaenslen's  +/- JAMES: initially patient reported pain on left SIJ; later in session described lateral hip tightness without SIJ pain.          10/19/20 Deferred  DTRs Left Right Comments   Patellar(L3-L4)         Achilles(S1-S2)                      Joint mobility:               []? Normal               [x]? Hypo: decreased PAs L4 and L5                []? 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); has hearing aids both sides    Exercises/Interventions:   ROM/stretches     Groin pain on R   Prone quad stretch 3 x 30\" each Pillow under pelvis;   Cues to PPT   HEPHEP   Hook lying rotation 10 x 10\"    Figure 4 3 x 30\" L Only KTOS   Self STM 3' Seated  On lax ball   Strengthening     HEPHEPAlt Heel Slides 3 x 10 In PPT        Glute Isometrics 10 x 10\" Together  10 x 10\" Alt Relax    Supine Clamshells 3 x 10 each Silver  One side moves at a time   Side-lying clamshells 3 x 10 each Silver   Fish Tail 3 x 10 each Add weight NPV   SLR Abduction 3 x 10 each 1#   Bridge 3 x 10 Silver loop at knees  Cues for glute focus   Hip Ext 3 x 10 each Off edge of table; knee flexed  Add weight NPV        Quadruped alternate UE reaches     Quadruped alternate LE reaches     Quadruped alternate UE/LE reaches          Leg Press 3 x 10 DL RANGE: 80-10, 80#  Back rest inclined                           Manual Intervention             Prone PA      Lumbar Manip      SI Manip      Hip belt mobs      Hip LA distraction              Patient Education:   10/19/20: Patient educated about PT diagnosis, prognosis, and plan of care; educated on role of physical therapy; educated on HEP; educated on anatomy of lumbar spine / lumbopelvic positioning. HEP:  Patient instructed on HEP on this date with handout provided and all questions answered. Patient was instructed to contact PT with any complications or questions about HEP moving forward. Patient verbally stated she/he understood. Updated 11/16/20  [employer] CODE: [id number]    Therapeutic Exercise and NMR EXR  [x] (33615) Provided verbal/tactile cueing for activities related to strengthening, flexibility, endurance, ROM  for improvements in proximal hip and core control with self care, mobility, lifting and ambulation. [x] (88706) Provided verbal/tactile cueing for activities related to improving balance, coordination, kinesthetic sense, posture, motor skill, proprioception  to assist with core control in self care, mobility, lifting, and ambulation.      Therapeutic Activities:    [x] (47879 or 49701) Provided verbal/tactile cueing for activities related to improving balance, coordination, kinesthetic sense, posture, motor skill, proprioception and motor activation to allow for proper function  with self care and ADLs  [] (62879) Provided training and instruction to the patient for proper core and proximal hip recruitment and positioning with ambulation re-education     Home Exercise Program:    [x] (47627) Reviewed/Progressed HEP activities related to strengthening, flexibility, endurance, ROM of core, proximal hip and LE for functional self-care, mobility, lifting and ambulation   [] (99033) Reviewed/Progressed HEP activities related to improving balance, coordination, kinesthetic sense, posture, motor skill, proprioception of core, proximal hip and LE for self care, mobility, lifting, and ambulation      Manual Treatments:  PROM / STM / Oscillations-Mobs:  G-I, II, III, IV (PA's, Inf., Post.)  [x] (23386) Provided manual therapy to mobilize proximal hip and LS spine soft tissue/joints for the purpose of modulating pain, promoting relaxation,  increasing ROM, reducing/eliminating soft tissue swelling/inflammation/restriction, improving soft tissue extensibility and allowing for proper ROM for normal function with self care, mobility, lifting and ambulation. Modalities:   Declined    Charges:  Timed Code Treatment Minutes: 40   Total Treatment Minutes: 40   2755PM-7425PS    [] EVAL (LOW) 02802 (typically 20 minutes face-to-face)  [] EVAL (MOD) 55529 (typically 30 minutes face-to-face)  [] EVAL (HIGH) 85931 (typically 45 minutes face-to-face)  [] RE-EVAL     [x] OW(35350) x  1   [] IONTO  [x] NMR (85833) x  1   [] VASO  [] Manual (09790) x      [] Other:  [x] TA x 1     [] Mech Traction (02457)  [] ES(attended) (76101)      [] ES (un) (90762):     GOALS:   Patient stated goal: Return to walking without pain    [x] Progressing: [] Met: [] Not Met: [] Adjusted    Therapist goals for Patient:   Short Term Goals: To be achieved in: 2 weeks  1. Independent in HEP and progression per patient tolerance, in order to prevent re-injury. [] Progressing: [x] Met: [] Not Met: [] Adjusted   2. Patient will have a decrease in pain to facilitate improvement in movement, function, and ADLs as indicated by Functional Deficits. [] Progressing: [x] Met: [] Not Met: [] Adjusted    Long Term Goals: To be achieved in: 8 weeks  1. Disability index score of 19.5% or less for the [place] to assist with reaching prior level of function. [x] Progressing: [] Met: [] Not Met: [] Adjusted  2. Patient will demonstrate increased AROM to WNL, good LS mobility, good hip ROM to allow for proper joint functioning as indicated by patients Functional Deficits. [x] Progressing: [] Met: [] Not Met: [] Adjusted  3. Patient will demonstrate an increase in Strength to good proximal hip and core activation to allow for proper functional mobility as indicated by patients Functional Deficits. [x] Progressing: [] Met: [] Not Met: [] Adjusted  4. Patient will return to ADLs and other functional activities without increased symptoms or restriction. [x] Progressing: [] Met: [] Not Met: [] Adjusted  5. Patient will return to walking for >45 minutes without pain in lumbar spine / left gluteal region (patient specific functional goal)    [x] Progressing: [] Met: [] Not Met: [] Adjusted     Overall Progression Towards Functional goals/ Treatment Progress Update:  [x] Patient is progressing as expected towards functional goals listed. [] Progression is slowed due to complexities/Impairments listed. [] Progression has been slowed due to co-morbidities.   [] Plan just implemented, too soon to assess goals progression <30days   [] Goals require adjustment due to lack of progress  [] Patient is not progressing as expected and requires additional follow up with physician  [] Other    Prognosis for POC: [x] Good [] Fair  [] Poor      Patient requires continued skilled intervention: [x] Yes  []

## 2020-11-30 ENCOUNTER — HOSPITAL ENCOUNTER (OUTPATIENT)
Dept: PHYSICAL THERAPY | Age: 75
Setting detail: THERAPIES SERIES
Discharge: HOME | End: 2020-11-30
Payer: MEDICARE

## 2020-11-30 PROCEDURE — 97530 THERAPEUTIC ACTIVITIES: CPT

## 2020-11-30 PROCEDURE — 97112 NEUROMUSCULAR REEDUCATION: CPT

## 2020-11-30 PROCEDURE — 97110 THERAPEUTIC EXERCISES: CPT

## 2020-11-30 NOTE — FLOWSHEET NOTE
[facility]      Physical Therapy Daily Treatment Note  Date:  2020    Patient Name:  Jhony Navarro   \"\"  :    MRN: [id number]  Restrictions/Precautions:    Medical/Treatment Diagnosis Information:  · Diagnosis: M43.16 (ICD-10-CM) - Spondylolisthesis, lumbar region  · Treatment Diagnosis: M54.5 Lumbar Pain; M51.35 DDD  Insurance/Certification information:  PT Insurance Information: Medicare  Physician Information:  Referring Practitioner: Sheridan Goltz  Has the plan of care been signed (Y/N):        [x]  Yes  []  No     Date of Patient follow up with Physician: 20      Is this a Progress Report:     []  Yes  [x]  No        If Yes:  Date Range for reporting period:  Beginning:   Ending:     Progress report will be due (10 Rx or 30 days whichever is less): 76      Recertification will be due (POC Duration  / 90 days whichever is less): 20         Visit # Insurance Allowable Auth Required   10 Medicare []  Yes [x]  No        OUTCOME MEASURE DATE DEFICIT   QUEBEC 10/19/20 IE 39% Deficit   QUEBEC 20 20% Deficit   Patient given satisfaction survey? [] Yes   [] No    Latex Allergy:  [x]NO      []YES  Preferred Language for Healthcare:   [x]English       []other:    Pain level:  1/10 at rest  2-3/10 at worst in past week    SUBJECTIVE:  Had busy weekend with holiday. Noted increased pain in low back after a day of long sitting at family's house on . No pain while sitting, more so at end of day. Pain again across lumbar spine and at times in left glute area. Noticed same pain this weekend while  Helping  outside with house decorating with lots of walking.          OBJECTIVE:     20  ROM   Comments   Trunk flexion Full - no pain Rib hump on R   Trunk extension 25% limited - painful General Lumbar pain    Trunk R sidebend Knee joint line Deviates into flexion; feels tightness on L side   Trunk L sidebend Knee join line Deviates into flexion; Feels tightness on R side   Trunk R rotation       Trunk L rotation       HS flexibility -15 degrees bilaterally 90/90   Quad flexibilty 90 degrees bilaterally Elys; limited by back pain               11/16/20  Strength Left Right Comments   Hip flexion(L2) 4+ 4+     Knee extension(L3) 5 5     Knee flexion(S1-2) 5 5     Ankle dorsiflexion(L4) 5 5     Toe extension(L5) NT NT     Ankle eversion/plantar flexion(S1) 5 5     Hip abd  4- 4-      Hip IR 4* 4* *mild lateral hip pain   Hip ER 4 4+       10/19/20  Special tests   Comments   SLR Neg Just HS tightness   Slump test Pos - mild sciatic nerve hypomobility bilaterally     Pelvic symmetry  Neg     Segmental Spinal mobility Mild hypomobility L4 and L5 PAs     Heel walk NT     Toe walk NT     Sensation Intact LE dermatomes               11/13/20:  SIJ   Laslett Criteria   (3+ variables: 91-94% sen, 78-87% spec)  -SI distraction  - SI compression  - Thigh thrust  - Gaenslen's  - Sacral thrust    Deana Hess  (3+ variables: 85% sen, 79% spec)  -SI distraction  - SI compression  - Thigh thrust  - Gaenslen's  +/- JAMES: initially patient reported pain on left SIJ; later in session described lateral hip tightness without SIJ pain.          10/19/20 Deferred  DTRs Left Right Comments   Patellar(L3-L4)         Achilles(S1-S2)                      Joint mobility:               []? Normal               [x]? Hypo: decreased PAs L4 and L5                []? Hyper     Palpation: increased muscle tension throughout R and L Lumbar paraspinals; TTP along right QL     Functional Mobility/Transfers: ind     Posture: anteriorly rotated pelvis ; mild scoliosis with right rib hump     Bandages/Dressings/Incisions: n/a     Gait: (include devices/WB status) 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); has hearing aids both sides    Exercises/Interventions:   ROM/stretches     Groin pain on R   Prone quad stretch 3 x 30\" each Pillow under pelvis;   Cues to PPT   HEPHEP   Hook lying rotation 10 x 10\"    Figure 4 3 x 30\" L Only KTOS   Self STM 3' Seated  On lax ball   Strengthening     HEPHEPHEPHEPSeated SB Marches 3 x 10 each Palloff Press 3 x 10 each direction Black             HEP   Supine Clamshells 3 x 10 each Silver  One side moves at a time   Side-lying clamshells 3 x 10 each Silver   Fish Tail 3 x 10 each 2#   SLR Abduction 3 x 10 each 1#   Bridge 3 x 10 Silver loop at knees  Cues for glute focus   Hip Ext 3 x 10 each Off edge of table; knee flexed; 2#                                                    Manual Intervention             Prone PA      Lumbar Manip      SI Manip      Hip belt mobs      Hip LA distraction              Patient Education:   10/19/20: Patient educated about PT diagnosis, prognosis, and plan of care; educated on role of physical therapy; educated on HEP; educated on anatomy of lumbar spine / lumbopelvic positioning. HEP:  Patient instructed on HEP on this date with handout provided and all questions answered. Patient was instructed to contact PT with any complications or questions about HEP moving forward. Patient verbally stated she/he understood. Updated 11/30/20  [employer] CODE: [id number]    Therapeutic Exercise and NMR EXR  [x] (71854) Provided verbal/tactile cueing for activities related to strengthening, flexibility, endurance, ROM  for improvements in proximal hip and core control with self care, mobility, lifting and ambulation. [x] (91304) Provided verbal/tactile cueing for activities related to improving balance, coordination, kinesthetic sense, posture, motor skill, proprioception  to assist with core control in self care, mobility, lifting, and ambulation.      Therapeutic Activities:    [x] (46963 or 83855) Provided verbal/tactile cueing for activities related to improving balance, coordination, kinesthetic sense, posture, motor skill, proprioception and motor activation to allow for proper function  with self care and ADLs  [] (73833) Provided training and instruction to the patient for proper core and proximal hip recruitment and positioning with ambulation re-education     Home Exercise Program:    [x] (21437) Reviewed/Progressed HEP activities related to strengthening, flexibility, endurance, ROM of core, proximal hip and LE for functional self-care, mobility, lifting and ambulation   [] (64878) Reviewed/Progressed HEP activities related to improving balance, coordination, kinesthetic sense, posture, motor skill, proprioception of core, proximal hip and LE for self care, mobility, lifting, and ambulation      Manual Treatments:  PROM / STM / Oscillations-Mobs:  G-I, II, III, IV (PA's, Inf., Post.)  [x] (37624) Provided manual therapy to mobilize proximal hip and LS spine soft tissue/joints for the purpose of modulating pain, promoting relaxation,  increasing ROM, reducing/eliminating soft tissue swelling/inflammation/restriction, improving soft tissue extensibility and allowing for proper ROM for normal function with self care, mobility, lifting and ambulation. Modalities:   Declined    Charges:  Timed Code Treatment Minutes: 45   Total Treatment Minutes: 45   1015am-1100am  **KX MODIFIER**    [] EVAL (LOW) 57921 (typically 20 minutes face-to-face)  [] EVAL (MOD) 24331 (typically 30 minutes face-to-face)  [] EVAL (HIGH) 63632 (typically 45 minutes face-to-face)  [] RE-EVAL     [x] UF(05345) x  1   [] IONTO  [x] NMR (88836) x  1   [] VASO  [] Manual (57300) x      [] Other:  [x] TA x 1     [] Mech Traction (07351)  [] ES(attended) (76815)      [] ES (un) (00487):     GOALS:   Patient stated goal: Return to walking without pain    [x] Progressing: [] Met: [] Not Met: [] Adjusted    Therapist goals for Patient:   Short Term Goals: To be achieved in: 2 weeks  1. Independent in HEP and progression per patient tolerance, in order to prevent re-injury.      [] Progressing: [x] Met: [] Not Met: [] Adjusted   2. Patient will have a decrease in pain to facilitate improvement in movement, function, and ADLs as indicated by Functional Deficits. [] Progressing: [x] Met: [] Not Met: [] Adjusted    Long Term Goals: To be achieved in: 8 weeks  1. Disability index score of 19.5% or less for the [place] to assist with reaching prior level of function. [x] Progressing: [] Met: [] Not Met: [] Adjusted  2. Patient will demonstrate increased AROM to WNL, good LS mobility, good hip ROM to allow for proper joint functioning as indicated by patients Functional Deficits. [x] Progressing: [] Met: [] Not Met: [] Adjusted  3. Patient will demonstrate an increase in Strength to good proximal hip and core activation to allow for proper functional mobility as indicated by patients Functional Deficits. [x] Progressing: [] Met: [] Not Met: [] Adjusted  4. Patient will return to ADLs and other functional activities without increased symptoms or restriction. [x] Progressing: [] Met: [] Not Met: [] Adjusted  5. Patient will return to walking for >45 minutes without pain in lumbar spine / left gluteal region (patient specific functional goal)    [x] Progressing: [] Met: [] Not Met: [] Adjusted     Overall Progression Towards Functional goals/ Treatment Progress Update:  [x] Patient is progressing as expected towards functional goals listed. [] Progression is slowed due to complexities/Impairments listed. [] Progression has been slowed due to co-morbidities.   [] Plan just implemented, too soon to assess goals progression <30days   [] Goals require adjustment due to lack of progress  [] Patient is not progressing as expected and requires additional follow up with physician  [] Other    Prognosis for POC: [x] Good [] Fair  [] Poor      Patient requires continued skilled intervention: [x] Yes  [] No    Treatment/Activity Tolerance:  [x] Patient able to complete treatment  [] Patient limited by fatigue  [] Patient limited by pain     [] Patient limited by other medical complications  [x] Other: Has difficult time with PPT motion / position in seated and standing positions. Extensive education with patient today about being more mindful on her posture in seated and standing functional positions. Hip strength improving and showing better, more consistent technique with glute strengthening exercises. Continue PT to improve patient's BLE flexibility, strength, and core strength in order to reduce pain in back and return her to PLOF. Prognosis: [] Good [x] Fair  [] Poor    Patient Requires Follow-up: [x] Yes  [] No    PLAN: See eval  [x] Continue per plan of care [] Alter current plan (see comments)  [] Plan of care initiated [] Hold pending MD visit [] Discharge    Electronically signed by: John Boyle PT, DPT, OCS    *If patient does not return for further follow ups after this date. Please consider this as the patients discharge from physical therapy.

## 2020-12-02 ENCOUNTER — HOSPITAL ENCOUNTER (OUTPATIENT)
Dept: PHYSICAL THERAPY | Age: 75
Setting detail: THERAPIES SERIES
Discharge: HOME | End: 2020-12-02
Payer: MEDICARE

## 2020-12-02 PROCEDURE — 97112 NEUROMUSCULAR REEDUCATION: CPT

## 2020-12-02 PROCEDURE — 97110 THERAPEUTIC EXERCISES: CPT

## 2020-12-02 PROCEDURE — 97530 THERAPEUTIC ACTIVITIES: CPT

## 2020-12-02 NOTE — FLOWSHEET NOTE
[facility]      Physical Therapy Daily Treatment Note  Date:  2020    Patient Name:  Jhony Navarro   \"\"  :    MRN: [id number]  Restrictions/Precautions:    Medical/Treatment Diagnosis Information:  · Diagnosis: M43.16 (ICD-10-CM) - Spondylolisthesis, lumbar region  · Treatment Diagnosis: M54.5 Lumbar Pain; M51.35 DDD  Insurance/Certification information:  PT Insurance Information: Medicare  Physician Information:  Referring Practitioner: Sheridan Goltz  Has the plan of care been signed (Y/N):        [x]  Yes  []  No     Date of Patient follow up with Physician: 20      Is this a Progress Report:     []  Yes  [x]  No        If Yes:  Date Range for reporting period:  Beginning:   Ending:     Progress report will be due (10 Rx or 30 days whichever is less):       Recertification will be due (POC Duration  / 90 days whichever is less): 20         Visit # Insurance Allowable Auth Required   11 Medicare []  Yes [x]  No        OUTCOME MEASURE DATE DEFICIT   QUEBEC 10/19/20 IE 39% Deficit   QUEBEC 20 20% Deficit   Patient given satisfaction survey? [] Yes   [] No    Latex Allergy:  [x]NO      []YES  Preferred Language for Healthcare:   [x]English       []other:    Pain level:  1/10 at rest  2-3/10 at worst in past week    SUBJECTIVE:  Doing well today. Mild muscle soreness after her visit on Monday. Has been working on PPT in seated/standing positions and thinks she is starting to get better at it. OBJECTIVE:     20  ROM   Comments   Trunk flexion Full - no pain Rib hump on R   Trunk extension 25% limited - painful General Lumbar pain    Trunk R sidebend Knee joint line Deviates into flexion; feels tightness on L side   Trunk L sidebend Knee join line Deviates into flexion;   Feels tightness on R side   Trunk R rotation       Trunk L rotation       HS flexibility -15 degrees bilaterally 90/90   Quad flexibilty 90 degrees bilaterally Elys; limited by back pain               11/16/20  Strength Left Right Comments   Hip flexion(L2) 4+ 4+     Knee extension(L3) 5 5     Knee flexion(S1-2) 5 5     Ankle dorsiflexion(L4) 5 5     Toe extension(L5) NT NT     Ankle eversion/plantar flexion(S1) 5 5     Hip abd  4- 4-      Hip IR 4* 4* *mild lateral hip pain   Hip ER 4 4+       10/19/20  Special tests   Comments   SLR Neg Just HS tightness   Slump test Pos - mild sciatic nerve hypomobility bilaterally     Pelvic symmetry  Neg     Segmental Spinal mobility Mild hypomobility L4 and L5 PAs     Heel walk NT     Toe walk NT     Sensation Intact LE dermatomes               11/13/20:  SIJ   Laslett Criteria   (3+ variables: 91-94% sen, 78-87% spec)  -SI distraction  - SI compression  - Thigh thrust  - Gaenslen's  - Sacral thrust    Madonna Dad javier Wurff  (3+ variables: 85% sen, 79% spec)  -SI distraction  - SI compression  - Thigh thrust  - Gaenslen's  +/- JAMES: initially patient reported pain on left SIJ; later in session described lateral hip tightness without SIJ pain.          10/19/20 Deferred  DTRs Left Right Comments   Patellar(L3-L4)         Achilles(S1-S2)                      Joint mobility:               []? Normal               [x]? Hypo: decreased PAs L4 and L5                []? 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); has hearing aids both sides    Exercises/Interventions:   ROM/stretches     Groin pain on R   Prone quad stretch 3 x 30\" each Pillow under pelvis;   Cues to PPT   HEPHEP   Hook lying rotation 10 x 10\"    Figure 4 3 x 30\" L Only KTOS   Self STM 3' Seated  On lax recruitment and positioning with ambulation re-education     Home Exercise Program:    [x] (87324) Reviewed/Progressed HEP activities related to strengthening, flexibility, endurance, ROM of core, proximal hip and LE for functional self-care, mobility, lifting and ambulation   [] (78576) Reviewed/Progressed HEP activities related to improving balance, coordination, kinesthetic sense, posture, motor skill, proprioception of core, proximal hip and LE for self care, mobility, lifting, and ambulation      Manual Treatments:  PROM / STM / Oscillations-Mobs:  G-I, II, III, IV (PA's, Inf., Post.)  [x] (63611) Provided manual therapy to mobilize proximal hip and LS spine soft tissue/joints for the purpose of modulating pain, promoting relaxation,  increasing ROM, reducing/eliminating soft tissue swelling/inflammation/restriction, improving soft tissue extensibility and allowing for proper ROM for normal function with self care, mobility, lifting and ambulation. Modalities:   Declined    Charges:  Timed Code Treatment Minutes: 40   Total Treatment Minutes: 40   1155am-1238pm  **KX MODIFIER**    [] EVAL (LOW) 82369 (typically 20 minutes face-to-face)  [] EVAL (MOD) 37690 (typically 30 minutes face-to-face)  [] EVAL (HIGH) 98639 (typically 45 minutes face-to-face)  [] RE-EVAL     [x] NZ(05535) x  1   [] IONTO  [x] NMR (20081) x  1   [] VASO  [] Manual (26834) x      [] Other:  [x] TA x 1     [] Mech Traction (09421)  [] ES(attended) (69727)      [] ES (un) (68908):     GOALS:   Patient stated goal: Return to walking without pain    [x] Progressing: [] Met: [] Not Met: [] Adjusted    Therapist goals for Patient:   Short Term Goals: To be achieved in: 2 weeks  1. Independent in HEP and progression per patient tolerance, in order to prevent re-injury. [] Progressing: [x] Met: [] Not Met: [] Adjusted   2.  Patient will have a decrease in pain to facilitate improvement in movement, function, and ADLs as indicated by Functional Deficits. [] Progressing: [x] Met: [] Not Met: [] Adjusted    Long Term Goals: To be achieved in: 8 weeks  1. Disability index score of 19.5% or less for the [place]. to assist with reaching prior level of function. [x] Progressing: [] Met: [] Not Met: [] Adjusted  2. Patient will demonstrate increased AROM to WNL, good LS mobility, good hip ROM to allow for proper joint functioning as indicated by patients Functional Deficits. [x] Progressing: [] Met: [] Not Met: [] Adjusted  3. Patient will demonstrate an increase in Strength to good proximal hip and core activation to allow for proper functional mobility as indicated by patients Functional Deficits. [x] Progressing: [] Met: [] Not Met: [] Adjusted  4. Patient will return to ADLs and other functional activities without increased symptoms or restriction. [x] Progressing: [] Met: [] Not Met: [] Adjusted  5. Patient will return to walking for >45 minutes without pain in lumbar spine / left gluteal region (patient specific functional goal)    [x] Progressing: [] Met: [] Not Met: [] Adjusted     Overall Progression Towards Functional goals/ Treatment Progress Update:  [x] Patient is progressing as expected towards functional goals listed. [] Progression is slowed due to complexities/Impairments listed. [] Progression has been slowed due to co-morbidities. [] Plan just implemented, too soon to assess goals progression <30days   [] Goals require adjustment due to lack of progress  [] Patient is not progressing as expected and requires additional follow up with physician  [] Other    Prognosis for POC: [x] Good [] Fair  [] Poor      Patient requires continued skilled intervention: [x] Yes  [] No    Treatment/Activity Tolerance:  [x] Patient able to complete treatment  [] Patient limited by fatigue  [] Patient limited by pain     [] Patient limited by other medical complications  [x] Other: Fatigued more today with additional exercises.  Did not have any issues with lateral step ups and no complains of hip or back pain. During lateral band walks, PT had to cue patient not to let knees cave into valgus. As she fatigued, she began to ER hips to allow for compensation from hip flexor and TFL. Was able to set herself in a PPT much better today during palloff press exercise without cueing from PT. Also had noticeable difficulty with increase to 2# for side-lying hip abduction exercise R>L with hip flexing and ER allowing compensation again from TFL and hip flexor. Continue PT to improve patient's BLE flexibility, strength, and core strength in order to reduce pain in back and return her to PLOF. Prognosis: [] Good [x] Fair  [] Poor    Patient Requires Follow-up: [x] Yes  [] No    PLAN: See eval  [x] Continue per plan of care [] Alter current plan (see comments)  [] Plan of care initiated [] Hold pending MD visit [] Discharge    Electronically signed by: Caridad Blount PT, DPT, OCS    *If patient does not return for further follow ups after this date. Please consider this as the patients discharge from physical therapy.

## 2020-12-07 ENCOUNTER — HOSPITAL ENCOUNTER (OUTPATIENT)
Dept: PHYSICAL THERAPY | Age: 75
Setting detail: THERAPIES SERIES
Discharge: HOME | End: 2020-12-07
Payer: MEDICARE

## 2020-12-07 PROCEDURE — 97112 NEUROMUSCULAR REEDUCATION: CPT

## 2020-12-07 PROCEDURE — 97110 THERAPEUTIC EXERCISES: CPT

## 2020-12-07 PROCEDURE — 97530 THERAPEUTIC ACTIVITIES: CPT

## 2020-12-07 NOTE — FLOWSHEET NOTE
rotation       Trunk L rotation       HS flexibility -15 degrees bilaterally 90/90   Quad flexibilty 90 degrees bilaterally E[place]; limited by back pain               11/16/20  Strength Left Right Comments   Hip flexion(L2) 4+ 4+     Knee extension(L3) 5 5     Knee flexion(S1-2) 5 5     Ankle dorsiflexion(L4) 5 5     Toe extension(L5) NT NT     Ankle eversion/plantar flexion(S1) 5 5     Hip abd  4- 4-      Hip IR 4* 4* *mild lateral hip pain   Hip ER 4 4+       10/19/20  Special tests   Comments   SLR Neg Just HS tightness   Slump test Pos - mild sciatic nerve hypomobility bilaterally     Pelvic symmetry  Neg     Segmental Spinal mobility Mild hypomobility L4 and L5 PAs     Heel walk NT     Toe walk NT     Sensation Intact LE dermatomes               11/13/20:  SIJ   Laslett Criteria   (3+ variables: 91-94% sen, 78-87% spec)  -SI distraction  - SI compression  - Thigh thrust  - Gaenslen's  - Sacral thrust    [place] javier Wurff  (3+ variables: 85% sen, 79% spec)  -SI distraction  - SI compression  - Thigh thrust  - Gaenslen's  +/- JAMES: initially patient reported pain on left SIJ; later in session described lateral hip tightness without SIJ pain.          10/19/20 Deferred  DTRs Left Right Comments   Patellar(L3-L4)         Achilles(S1-S2)                      Joint mobility:               []? Normal               [x]? Hypo: decreased PAs L4 and L5                []? 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); has hearing aids both sides    Exercises/Interventions:   ROM/stretches     Groin pain on R   Prone quad stretch 3 x 30\" each Pillow under pelvis;   Cues to PPT   HEPHEP   Hook lying rotation 10 x 10\"    Figure 4 3 x 30\" L Only KTOS   Self STM 3' Seated  On lax ball   Strengthening     HEPHEPHEPHEPSeated SB Marches 3 x 10 each Seated SB PNF Chops 2 x 10 4# Ball   Palloff Press 3 x 10 each direction Black             HEP   HEPSide-lying clamshells 3 x 12 each Silver   Fish Tail 3 x 10 each 3#   SLR Abduction 3 x 10 each 2#   Bridge 3 x 12 Silver loop at knees  Cues for glute focus   Hip Ext 3 x 10 each Off edge of table; knee flexed; 3#   Lateral step up 3 x 10 each 8\"   Lateral band walks 3 x 10 both ways Blue                       Wobble Board 3 x 20\" M/L                 Manual Intervention             Prone PA      Lumbar Manip      SI Manip      Hip belt mobs      Hip LA distraction              Patient Education:   10/19/20: Patient educated about PT diagnosis, prognosis, and plan of care; educated on role of physical therapy; educated on HEP; educated on anatomy of lumbar spine / lumbopelvic positioning. HEP:  Patient instructed on HEP on this date with handout provided and all questions answered. Patient was instructed to contact PT with any complications or questions about HEP moving forward. Patient verbally stated she/he understood. Updated 12/2/20  vWise CODE: [id number]    Therapeutic Exercise and NMR EXR  [x] (67034) Provided verbal/tactile cueing for activities related to strengthening, flexibility, endurance, ROM  for improvements in proximal hip and core control with self care, mobility, lifting and ambulation. [x] (23160) Provided verbal/tactile cueing for activities related to improving balance, coordination, kinesthetic sense, posture, motor skill, proprioception  to assist with core control in self care, mobility, lifting, and ambulation.      Therapeutic Activities:    [x] (83729 or 30589) Provided verbal/tactile cueing for activities related to improving balance, coordination, kinesthetic sense, posture, motor skill, proprioception and motor activation to allow for proper function  with self care and ADLs  [] (62488) Provided training and instruction to the patient for proper core and proximal hip recruitment and positioning with ambulation re-education     Home Exercise Program:    [x] (13550) Reviewed/Progressed HEP activities related to strengthening, flexibility, endurance, ROM of core, proximal hip and LE for functional self-care, mobility, lifting and ambulation   [] (32652) Reviewed/Progressed HEP activities related to improving balance, coordination, kinesthetic sense, posture, motor skill, proprioception of core, proximal hip and LE for self care, mobility, lifting, and ambulation      Manual Treatments:  PROM / STM / Oscillations-Mobs:  G-I, II, III, IV (PA's, Inf., Post.)  [x] (38235) Provided manual therapy to mobilize proximal hip and LS spine soft tissue/joints for the purpose of modulating pain, promoting relaxation,  increasing ROM, reducing/eliminating soft tissue swelling/inflammation/restriction, improving soft tissue extensibility and allowing for proper ROM for normal function with self care, mobility, lifting and ambulation. Modalities:   Declined    Charges:  Timed Code Treatment Minutes: 50   Total Treatment Minutes: 50   1015am-1105am  **KX MODIFIER**    [] EVAL (LOW) 19004 (typically 20 minutes face-to-face)  [] EVAL (MOD) 35241 (typically 30 minutes face-to-face)  [] EVAL (HIGH) 96883 (typically 45 minutes face-to-face)  [] RE-EVAL     [x] CD(08542) x  1   [] IONTO  [x] NMR (71003) x  1   [] VASO  [] Manual (05390) x      [] Other:  [x] TA x 1     [] Mech Traction (90451)  [] ES(attended) (56606)      [] ES (un) (36595):     GOALS:   Patient stated goal: Return to walking without pain    [x] Progressing: [] Met: [] Not Met: [] Adjusted    Therapist goals for Patient:   Short Term Goals: To be achieved in: 2 weeks  1. Independent in HEP and progression per patient tolerance, in order to prevent re-injury.      [] Progressing: [x] Met: [] Not Met: [] Adjusted   2. Patient will have a decrease in pain to facilitate improvement in movement, function, and ADLs as indicated by Functional Deficits. [] Progressing: [x] Met: [] Not Met: [] Adjusted    Long Term Goals: To be achieved in: 8 weeks  1. Disability index score of 19.5% or less for the [place] to assist with reaching prior level of function. [x] Progressing: [] Met: [] Not Met: [] Adjusted  2. Patient will demonstrate increased AROM to WNL, good LS mobility, good hip ROM to allow for proper joint functioning as indicated by patients Functional Deficits. [x] Progressing: [] Met: [] Not Met: [] Adjusted  3. Patient will demonstrate an increase in Strength to good proximal hip and core activation to allow for proper functional mobility as indicated by patients Functional Deficits. [x] Progressing: [] Met: [] Not Met: [] Adjusted  4. Patient will return to ADLs and other functional activities without increased symptoms or restriction. [x] Progressing: [] Met: [] Not Met: [] Adjusted  5. Patient will return to walking for >45 minutes without pain in lumbar spine / left gluteal region (patient specific functional goal)    [x] Progressing: [] Met: [] Not Met: [] Adjusted     Overall Progression Towards Functional goals/ Treatment Progress Update:  [x] Patient is progressing as expected towards functional goals listed. [] Progression is slowed due to complexities/Impairments listed. [] Progression has been slowed due to co-morbidities.   [] Plan just implemented, too soon to assess goals progression <30days   [] Goals require adjustment due to lack of progress  [] Patient is not progressing as expected and requires additional follow up with physician  [] Other    Prognosis for POC: [x] Good [] Fair  [] Poor      Patient requires continued skilled intervention: [x] Yes  [] No    Treatment/Activity Tolerance:  [x] Patient able to complete treatment  [] Patient limited by fatigue  [] Patient limited by pain     [] Patient limited by other medical complications  [x] Other: Very challenged by seated PNF chops on SB. Showing improvement in hip strength and tolerated increase in reps on both bridges and clamshells without issue. Showing improvement in symptoms with walking and can tolerate 40 minute walks with only mild pain which was a goal of hers. Overall doing very well. Continue PT to improve patient's BLE flexibility, strength, and core strength in order to reduce pain in back and return her to PLOF. Prognosis: [] Good [x] Fair  [] Poor    Patient Requires Follow-up: [x] Yes  [] No    PLAN: See eval  [x] Continue per plan of care [] Alter current plan (see comments)  [] Plan of care initiated [] Hold pending MD visit [] Discharge    Electronically signed by: Viridiana Avilez PT, DPT, OCS    *If patient does not return for further follow ups after this date. Please consider this as the patients discharge from physical therapy.

## 2020-12-09 ENCOUNTER — HOSPITAL ENCOUNTER (OUTPATIENT)
Dept: PHYSICAL THERAPY | Age: 75
Setting detail: THERAPIES SERIES
Discharge: HOME | End: 2020-12-09
Payer: MEDICARE

## 2020-12-09 PROCEDURE — 97530 THERAPEUTIC ACTIVITIES: CPT

## 2020-12-09 PROCEDURE — 97112 NEUROMUSCULAR REEDUCATION: CPT

## 2020-12-09 PROCEDURE — 97110 THERAPEUTIC EXERCISES: CPT

## 2020-12-09 NOTE — FLOWSHEET NOTE
[facility]      Physical Therapy Daily Treatment Note  Date:  2020    Patient Name:  Pepe Vyas   \"\"  :    MRN: [id number]  Restrictions/Precautions:    Medical/Treatment Diagnosis Information:  · Diagnosis: M43.16 (ICD-10-CM) - Spondylolisthesis, lumbar region  · Treatment Diagnosis: M54.5 Lumbar Pain; M51.35 DDD  Insurance/Certification information:  PT Insurance Information: Medicare  Physician Information:  Referring Practitioner: Otilia Villegas  Has the plan of care been signed (Y/N):        [x]  Yes  []  No     Date of Patient follow up with Physician: 20      Is this a Progress Report:     []  Yes  [x]  No        If Yes:  Date Range for reporting period:  Beginning:   Ending:     Progress report will be due (10 Rx or 30 days whichever is less):       Recertification will be due (POC Duration  / 90 days whichever is less): 20         Visit # Insurance Allowable Auth Required   13 Medicare []  Yes [x]  No        OUTCOME MEASURE DATE DEFICIT   QUEBEC 10/19/20 IE 39% Deficit   QUEBEC 20 20% Deficit   Patient given satisfaction survey? [] Yes   [] No    Latex Allergy:  [x]NO      []YES  Preferred Language for Healthcare:   [x]English       []other:    Pain level:  1/10 at rest  2-3/10 at worst in past week    SUBJECTIVE:  Doing well today - no pain at rest. Did report mild low back pain for a few hours after PT on Monday but went away on it's own. Hopes to go for walk today outside. OBJECTIVE:     20  ROM   Comments   Trunk flexion Full - no pain Rib hump on R   Trunk extension 25% limited - painful General Lumbar pain    Trunk R sidebend Knee joint line Deviates into flexion; feels tightness on L side   Trunk L sidebend Knee join line Deviates into flexion;   Feels tightness on R side   Trunk R rotation       Trunk L rotation       HS flexibility -15 degrees bilaterally 90/90   Quad flexibilty 90 ball   Strengthening     HEPHEPHEPHEPSeated SB Marches 3 x 10 each Seated SB PNF Chops 2 x 10 4# Ball   Palloff Press 3 x 10 each direction Black             HEP   HEPSide-lying clamshells 3 x 12 each Silver  Change to ankle weights NPV   Fish Tail 3 x 10 each 3#  Inc NPV   SLR Abduction 3 x 10 each 2#   Bridge 3 x 12 Silver loop at knees  Cues for glute focus   Lateral step up 3 x 10 each 8\"   Lateral band walks 3 x 10 both ways Blue                       Wobble Board 3 x 20\" M/L                 Manual Intervention             Prone PA      Lumbar Manip      SI Manip      Hip belt mobs      Hip LA distraction              Patient Education:   10/19/20: Patient educated about PT diagnosis, prognosis, and plan of care; educated on role of physical therapy; educated on HEP; educated on anatomy of lumbar spine / lumbopelvic positioning. HEP:  Patient instructed on HEP on this date with handout provided and all questions answered. Patient was instructed to contact PT with any complications or questions about HEP moving forward. Patient verbally stated she/he understood. Updated 12/2/20  MediTAP CODE: [id number]    Therapeutic Exercise and NMR EXR  [x] (36449) Provided verbal/tactile cueing for activities related to strengthening, flexibility, endurance, ROM  for improvements in proximal hip and core control with self care, mobility, lifting and ambulation. [x] (00403) Provided verbal/tactile cueing for activities related to improving balance, coordination, kinesthetic sense, posture, motor skill, proprioception  to assist with core control in self care, mobility, lifting, and ambulation.      Therapeutic Activities:    [x] (84880 or 57650) Provided verbal/tactile cueing for activities related to improving balance, coordination, kinesthetic sense, posture, motor skill, proprioception and motor activation to allow for proper function  with self care and ADLs  [] (02550) Provided training and instruction to the patient for proper core and proximal hip recruitment and positioning with ambulation re-education     Home Exercise Program:    [x] (82014) Reviewed/Progressed HEP activities related to strengthening, flexibility, endurance, ROM of core, proximal hip and LE for functional self-care, mobility, lifting and ambulation   [] (64927) Reviewed/Progressed HEP activities related to improving balance, coordination, kinesthetic sense, posture, motor skill, proprioception of core, proximal hip and LE for self care, mobility, lifting, and ambulation      Manual Treatments:  PROM / STM / Oscillations-Mobs:  G-I, II, III, IV (PA's, Inf., Post.)  [x] (72420) Provided manual therapy to mobilize proximal hip and LS spine soft tissue/joints for the purpose of modulating pain, promoting relaxation,  increasing ROM, reducing/eliminating soft tissue swelling/inflammation/restriction, improving soft tissue extensibility and allowing for proper ROM for normal function with self care, mobility, lifting and ambulation. Modalities:   Declined    Charges:  Timed Code Treatment Minutes: 41   Total Treatment Minutes: 58   [id number]  **KX MODIFIER**    [] EVAL (LOW) 43938 (typically 20 minutes face-to-face)  [] EVAL (MOD) 25383 (typically 30 minutes face-to-face)  [] EVAL (HIGH) 14233 (typically 45 minutes face-to-face)  [] RE-EVAL     [x] LV(18639) x  1   [] IONTO  [x] NMR (29054) x  1   [] VASO  [] Manual (72607) x      [] Other:  [x] TA x 1     [] Mech Traction (10527)  [] ES(attended) (43642)      [] ES (un) (29307):     GOALS:   Patient stated goal: Return to walking without pain    [x] Progressing: [] Met: [] Not Met: [] Adjusted    Therapist goals for Patient:   Short Term Goals: To be achieved in: 2 weeks  1. Independent in HEP and progression per patient tolerance, in order to prevent re-injury. [] Progressing: [x] Met: [] Not Met: [] Adjusted   2.  Patient will have a decrease in pain to facilitate improvement in movement, function, and ADLs as indicated by Functional Deficits. [] Progressing: [x] Met: [] Not Met: [] Adjusted    Long Term Goals: To be achieved in: 8 weeks  1. Disability index score of 19.5% or less for the [place] to assist with reaching prior level of function. [x] Progressing: [] Met: [] Not Met: [] Adjusted  2. Patient will demonstrate increased AROM to WNL, good LS mobility, good hip ROM to allow for proper joint functioning as indicated by patients Functional Deficits. [x] Progressing: [] Met: [] Not Met: [] Adjusted  3. Patient will demonstrate an increase in Strength to good proximal hip and core activation to allow for proper functional mobility as indicated by patients Functional Deficits. [x] Progressing: [] Met: [] Not Met: [] Adjusted  4. Patient will return to ADLs and other functional activities without increased symptoms or restriction. [x] Progressing: [] Met: [] Not Met: [] Adjusted  5. Patient will return to walking for >45 minutes without pain in lumbar spine / left gluteal region (patient specific functional goal)    [x] Progressing: [] Met: [] Not Met: [] Adjusted     Overall Progression Towards Functional goals/ Treatment Progress Update:  [x] Patient is progressing as expected towards functional goals listed. [] Progression is slowed due to complexities/Impairments listed. [] Progression has been slowed due to co-morbidities.   [] Plan just implemented, too soon to assess goals progression <30days   [] Goals require adjustment due to lack of progress  [] Patient is not progressing as expected and requires additional follow up with physician  [] Other    Prognosis for POC: [x] Good [] Fair  [] Poor      Patient requires continued skilled intervention: [x] Yes  [] No    Treatment/Activity Tolerance:  [x] Patient able to complete treatment  [] Patient limited by fatigue  [] Patient limited by pain     [] Patient limited by other medical complications  [x] Other: Maintained program the same today due to low back soreness after Monday's visit. Discussed monitoring symptoms today after PT as well as before/during/after her walk this afternoon. Continues to show slow improvement in hip and core strength. Continue PT to improve patient's BLE flexibility, strength, and core strength in order to reduce pain in back and return her to PLOF. Prognosis: [] Good [x] Fair  [] Poor    Patient Requires Follow-up: [x] Yes  [] No    PLAN: See eval  [x] Continue per plan of care [] Alter current plan (see comments)  [] Plan of care initiated [] Hold pending MD visit [] Discharge    Electronically signed by: Caridad Blount PT, DPT, OCS    *If patient does not return for further follow ups after this date. Please consider this as the patients discharge from physical therapy.

## 2020-12-14 ENCOUNTER — HOSPITAL ENCOUNTER (OUTPATIENT)
Dept: PHYSICAL THERAPY | Age: 75
Setting detail: THERAPIES SERIES
Discharge: HOME | End: 2020-12-14
Payer: MEDICARE

## 2020-12-14 PROCEDURE — 97112 NEUROMUSCULAR REEDUCATION: CPT

## 2020-12-14 PROCEDURE — 97110 THERAPEUTIC EXERCISES: CPT

## 2020-12-14 PROCEDURE — 97530 THERAPEUTIC ACTIVITIES: CPT

## 2020-12-14 NOTE — PLAN OF CARE
The [address] [place] [address], [facility] AT [place]     Physical Therapy Re-Certification Plan of Care    Dear  Dr. Raymundo Dewey,    We had the pleasure of treating the following patient for physical therapy services at [address] [place] [address]. A summary of our findings can be found in the updated assessment below. This includes our plan of care. If you have any questions or concerns regarding these findings, please do not hesitate to contact me at [phone].   Thank you for the referral.     Physician Signature:________________________________Date:__________________  By signing above (or electronic signature), therapists plan is approved by physician      Overall Response to Treatment:   []Patient is responding well to treatment and improvement is noted with regards  to goals   []Patient should continue to improve in reasonable time if they continue HEP   []Patient has plateaued and is no longer responding to skilled PT intervention    []Patient is getting worse and would benefit from return to referring MD   []Patient unable to adhere to initial POC [x]Other: Patient starting to plateau with her progress in therapy. She has had complete relief of right hip \"grabbing\" pain that was her primary complaint at start of physical therapy. She has made good improvements to date in flexibility and strength, particularly her glute strength. Despite these improvements, she has had no change with left gluteal pain that occurs immediately when she begins to go for walks or with prolonged standing. This symptom is unable to be reproduce in clinic including with all SIJ provocative and hip provocative tests. She does get mild groin pain at times particularly with FADDIR and JAMES demonstrating possible underlying FRANCISCO/OA in left hip which is unsurprising as patient has history of right hip OA requiring YASEMIN one year ago. Her lumbar pain is mild in nature but continues to reoccur despite improvement in core and hip strength. Today, she had increased tone all across left lumbar paraspinals and QL that improved after manual IASTM. It was recommend patient follow up with Dr. Jairo Bui in regards to her on-going, unchanged left gluteal pain while continuing PT for an additional 4-6 weeks. She is in agreement with this plan.      Date range of Visits: 10/19/200-20  Total Visits: 14    Physical Therapy Daily Treatment Note  Date:  2020    Patient Name:  Saskia Landry   \"\"  :    MRN: [id number]  Restrictions/Precautions:    Medical/Treatment Diagnosis Information:  · Diagnosis: M43.16 (ICD-10-CM) - Spondylolisthesis, lumbar region  · Treatment Diagnosis: M54.5 Lumbar Pain; M51.35 DDD  Insurance/Certification information:  PT Insurance Information: Medicare  Physician Information:  Referring Practitioner: Jairo Bui  Has the plan of care been signed (Y/N):        [x]  Yes  []  No     Date of Patient follow up with Physician: 20      Is this a Progress Report:     [x]  Yes  []  No        If Yes:  Date Range for reporting period:  Beginning: 10/19/20 Endin20    Progress report will be due (10 Rx or 30 days whichever is less):       Recertification will be due (POC Duration  / 90 days whichever is less): 21        Visit # Insurance Allowable Auth Required   14 Medicare []  Yes [x]  No        OUTCOME MEASURE DATE DEFICIT   QUEBEC 10/19/20 IE 39% Deficit   QUEBEC 20 20% Deficit   QUEBEC 20 20% Deficit   Patient given satisfaction survey? [] Yes   [] No    Latex Allergy:  [x]NO      []YES  Preferred Language for Healthcare:   [x]English       []other:    Pain level:  1/10 at rest  2-3/10 at worst in past week    SUBJECTIVE:  Patient reports she is doing the same today. Has not seen any improvement in her left hip/gluteal pain. She states she walked 40 minutes with pain almost immediately in left hip. Pain did not increase but maintained the same intensity all walk. Returned home and put heat on - pain and relieved by 30 minutes. Thursday she exercised in AM and walked 30 minutes in afternoon with same type of symptoms again relieved by sitting. On Friday she stretched in AM before her walk but pain again was the same. She did try to take smaller strides which helped very slightly. Later in the day, she was on her feet for an house and both hip and back bothered her. Pain in hip is posterior in gluteal/buttocks region. Denies any SIJ pain, lateral hip pain, or groin pain. Denies any radiating pain down leg or numbness/tingling in either LE. Does not get this pain with her exercises - only with standing or walking. OBJECTIVE:     20  ROM   Comments   Trunk flexion Full Rib hump on R; pain on left lumbar paraspinals   Trunk extension 50% limited - painful General Lumbar pain    Trunk R sidebend Knee joint line Deviates into flexion;   Trunk L sidebend Knee join line Deviates into flexion;   Feels tightness on R side   Trunk R rotation       Trunk L rotation       HS flexibility -10 degrees bilaterally 90/90 Quad flexibilty 110 degrees bilaterally Elys; limited by back pain               12/14/20  Strength Left Right Comments   Hip flexion(L2) 4+ 4+     Knee extension(L3) 5 5     Knee flexion(S1-2) 5 5     Ankle dorsiflexion(L4) 5 5     Toe extension(L5) NT NT     Ankle eversion/plantar flexion(S1) 5 5     Hip abd  4 4     Hip IR Hip ER 4+ 4+       10/19/20  Special tests   Comments   SLR Neg Just HS tightness   Slump test Pos - mild sciatic nerve hypomobility bilaterally     Pelvic symmetry  Neg     Segmental Spinal mobility Mild hypomobility L4 and L5 PAs     Heel walk NT     Toe walk NT     Sensation Intact LE dermatomes               12/14/20:  SIJ   Laslett Criteria   (3+ variables: 91-94% sen, 78-87% spec)  -SI distraction  - SI compression  - Thigh thrust  - Gaenslen's  - Sacral thrust    Romario Larios javier Wurff  (3+ variables: 85% sen, 79% spec)  -SI distraction  - SI compression  - Thigh thrust  - Gaenslen's  - JAMES: Only groin pain present; no SIJ pain         10/19/20 Deferred  DTRs Left Right Comments   Patellar(L3-L4)         Achilles(S1-S2)                      Joint mobility:               []? Normal               [x]? Hypo: decreased PAs L4 and L5                []? 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); has hearing aids both sides    Exercises/Interventions:   ROM/stretches     Groin pain on R   Hip Flexor 3 x 30\" Leg off table   HEPHEP   Hook lying rotation 10 x 10\"    Figure 4 3 x 30\" L Only KTOS   Strengthening     [id number]          HEP   HEPSide-lying clamshells 3 x 10 each 3#   NPVSLR Abduction 3 x 10 each 2# Bridge 2 x 10 x3\" holds Silver loop at [employer] for glute focus                       NPV              Manual Intervention             Prone PA      GISTM/STM 5'  IASTM to left and Paraspinals and QL   Lumbar Manip      SI Manip      Hip belt mobs      Hip LA distraction              Patient Education:   10/19/20: Patient educated about PT diagnosis, prognosis, and plan of care; educated on role of physical therapy; educated on HEP; educated on anatomy of lumbar spine / lumbopelvic positioning. HEP:  Patient instructed on HEP on this date with handout provided and all questions answered. Patient was instructed to contact PT with any complications or questions about HEP moving forward. Patient verbally stated she/he understood. Updated 12/14/20  [employer] CODE: [id number]    Therapeutic Exercise and NMR EXR  [x] (26141) Provided verbal/tactile cueing for activities related to strengthening, flexibility, endurance, ROM  for improvements in proximal hip and core control with self care, mobility, lifting and ambulation. [x] (21911) Provided verbal/tactile cueing for activities related to improving balance, coordination, kinesthetic sense, posture, motor skill, proprioception  to assist with core control in self care, mobility, lifting, and ambulation.      Therapeutic Activities:    [x] (70998 or 37601) Provided verbal/tactile cueing for activities related to improving balance, coordination, kinesthetic sense, posture, motor skill, proprioception and motor activation to allow for proper function  with self care and ADLs  [] (97906) Provided training and instruction to the patient for proper core and proximal hip recruitment and positioning with ambulation re-education     Home Exercise Program:    [x] (91770) Reviewed/Progressed HEP activities related to strengthening, flexibility, endurance, ROM of core, proximal hip and LE for functional self-care, mobility, lifting and ambulation [] (47432) Reviewed/Progressed HEP activities related to improving balance, coordination, kinesthetic sense, posture, motor skill, proprioception of core, proximal hip and LE for self care, mobility, lifting, and ambulation      Manual Treatments:  PROM / STM / Oscillations-Mobs:  G-I, II, III, IV (PA's, Inf., Post.)  [x] (67494) Provided manual therapy to mobilize proximal hip and LS spine soft tissue/joints for the purpose of modulating pain, promoting relaxation,  increasing ROM, reducing/eliminating soft tissue swelling/inflammation/restriction, improving soft tissue extensibility and allowing for proper ROM for normal function with self care, mobility, lifting and ambulation. Modalities:   Declined    Charges:  Timed Code Treatment Minutes: 45   Total Treatment Minutes: 45   205pm-300pm  **KX MODIFIER**    [] EVAL (LOW) 24352 (typically 20 minutes face-to-face)  [] EVAL (MOD) 96488 (typically 30 minutes face-to-face)  [] EVAL (HIGH) 94875 (typically 45 minutes face-to-face)  [] RE-EVAL     [x] ZF(08229) x  1   [] IONTO  [x] NMR (97217) x  1   [] VASO  [] Manual (03608) x      [] Other:  [x] TA x 1     [] Mech Traction (01814)  [] ES(attended) (22533)      [] ES (un) (24245):     GOALS:   Patient stated goal: Return to walking without pain    [] Progressing: [] Met: [x] Not Met: [] Adjusted    Therapist goals for Patient:   Short Term Goals: To be achieved in: 2 weeks  1. Independent in HEP and progression per patient tolerance, in order to prevent re-injury. [] Progressing: [x] Met: [] Not Met: [] Adjusted   2. Patient will have a decrease in pain to facilitate improvement in movement, function, and ADLs as indicated by Functional Deficits. [] Progressing: [x] Met: [] Not Met: [] Adjusted    Long Term Goals: To be achieved in: 8 weeks  1. Disability index score of 19.5% or less for the [place] to assist with reaching prior level of function.    [x] Progressing: [] Met: [] Not Met: [] Adjusted 2. Patient will demonstrate increased AROM to WNL, good LS mobility, good hip ROM to allow for proper joint functioning as indicated by patients Functional Deficits. [x] Progressing: [] Met: [] Not Met: [] Adjusted  3. Patient will demonstrate an increase in Strength to good proximal hip and core activation to allow for proper functional mobility as indicated by patients Functional Deficits. [x] Progressing: [] Met: [] Not Met: [] Adjusted  4. Patient will return to ADLs and other functional activities without increased symptoms or restriction. [x] Progressing: [] Met: [] Not Met: [] Adjusted  5. Patient will return to walking for >45 minutes without pain in lumbar spine / left gluteal region (patient specific functional goal)    [] Progressing: [] Met: [x] Not Met: [] Adjusted     Overall Progression Towards Functional goals/ Treatment Progress Update:  [x] Patient is progressing as expected towards functional goals listed. [] Progression is slowed due to complexities/Impairments listed. [] Progression has been slowed due to co-morbidities. [] Plan just implemented, too soon to assess goals progression <30days   [] Goals require adjustment due to lack of progress  [] Patient is not progressing as expected and requires additional follow up with physician  [] Other    Prognosis for POC: [x] Good [] Fair  [] Poor    Patient requires continued skilled intervention: [x] Yes  [] No    Treatment/Activity Tolerance:  [x] Patient able to complete treatment  [] Patient limited by fatigue  [] Patient limited by pain     [] Patient limited by other medical complications  [x] Other:See above. Continue PT to improve patient's BLE flexibility, strength, and core strength in order to reduce pain in back and return her to PLOF.      Prognosis: [] Good [x] Fair  [] Poor    Patient Requires Follow-up: [x] Yes  [] No    PLAN: See eval  [x] Continue per plan of care [] Alter current plan (see comments) [] Plan of care initiated [] Hold pending MD visit [] Discharge    Electronically signed by: Kellie Hodgkin PT, DPT, OCS    *If patient does not return for further follow ups after this date. Please consider this as the patients discharge from physical therapy.

## 2020-12-16 ENCOUNTER — HOSPITAL ENCOUNTER (OUTPATIENT)
Dept: PHYSICAL THERAPY | Age: 75
Setting detail: THERAPIES SERIES
Discharge: HOME | End: 2020-12-16
Payer: MEDICARE

## 2020-12-16 PROCEDURE — 97530 THERAPEUTIC ACTIVITIES: CPT

## 2020-12-16 PROCEDURE — 97112 NEUROMUSCULAR REEDUCATION: CPT

## 2020-12-16 PROCEDURE — 97110 THERAPEUTIC EXERCISES: CPT

## 2020-12-16 NOTE — FLOWSHEET NOTE
The [facility]        Physical Therapy Daily Treatment Note  Date:  2020    Patient Name:  Casimiro Wren   \"\"  :    MRN: [id number]  Restrictions/Precautions:    Medical/Treatment Diagnosis Information:  · Diagnosis: M43.16 (ICD-10-CM) - Spondylolisthesis, lumbar region  · Treatment Diagnosis: M54.5 Lumbar Pain; M51.35 DDD  Insurance/Certification information:  PT Insurance Information: Medicare  Physician Information:  Referring Practitioner: Aziza Rodriguez  Has the plan of care been signed (Y/N):        [x]  Yes  []  No     Date of Patient follow up with Physician: 20      Is this a Progress Report:     []  Yes  [x]  No        If Yes:  Date Range for reporting period:  Beginning:   Ending:     Progress report will be due (10 Rx or 30 days whichever is less): 22      Recertification will be due (POC Duration  / 90 days whichever is less): 21        Visit # Insurance Allowable Auth Required   15 Medicare []  Yes [x]  No        OUTCOME MEASURE DATE DEFICIT   QUEBEC 10/19/20 IE 39% Deficit   QUEBEC 20 20% Deficit   QUEBEC 20 20% Deficit   Patient given satisfaction survey? [] Yes   [] No    Latex Allergy:  [x]NO      []YES  Preferred Language for Healthcare:   [x]English       []other:    Pain level:  1/10 at rest  2-3/10 at worst in past week    SUBJECTIVE:  Patient reports she is doing fine today. No pain at rest. Felt good after last visit and did not have any soreness from STM done by PT.       OBJECTIVE:     20  ROM   Comments   Trunk flexion Full Rib hump on R; pain on left lumbar paraspinals   Trunk extension 50% limited - painful General Lumbar pain    Trunk R sidebend Knee joint line Deviates into flexion;   Trunk L sidebend Knee join line Deviates into flexion;   Feels tightness on R side   Trunk R rotation       Trunk L rotation       HS flexibility -10 degrees bilaterally 90/90   Quad flexibilty 110 degrees bilaterally Elys; limited by back pain               12/14/20  Strength Left Right Comments   Hip flexion(L2) 4+ 4+     Knee extension(L3) 5 5     Knee flexion(S1-2) 5 5     Ankle dorsiflexion(L4) 5 5     Toe extension(L5) NT NT     Ankle eversion/plantar flexion(S1) 5 5     Hip abd  4 4     Hip IR Hip ER 4+ 4+       10/19/20  Special tests   Comments   SLR Neg Just HS tightness   Slump test Pos - mild sciatic nerve hypomobility bilaterally     Pelvic symmetry  Neg     Segmental Spinal mobility Mild hypomobility L4 and L5 PAs     Heel walk NT     Toe walk NT     Sensation Intact LE dermatomes               12/14/20:  SIJ   Laslett Criteria   (3+ variables: 91-94% sen, 78-87% spec)  -SI distraction  - SI compression  - Thigh thrust  - Gaenslen's  - Sacral thrust    Jorden Seay javier Wurff  (3+ variables: 85% sen, 79% spec)  -SI distraction  - SI compression  - Thigh thrust  - Gaenslen's  - JAMES: Only groin pain present; no SIJ pain         10/19/20 Deferred  DTRs Left Right Comments   Patellar(L3-L4)         Achilles(S1-S2)                      Joint mobility:               []? Normal               [x]? Hypo: decreased PAs L4 and L5                []? Hyper     Palpation: increased muscle tension throughout L Lumbar paraspinals; TTP along Left QL 12/14/20     Functional Mobility/Transfers: ind     Posture: anteriorly rotated pelvis ; mild scoliosis with right rib hump     Bandages/Dressings/Incisions: n/a     Gait: (include devices/WB status) [facility];        RESTRICTIONS/PRECAUTIONS: HLD (medicated);  Histoplasmosis (difficulty seeing out of R eye); has hearing aids both sides    Exercises/Interventions:   ROM/stretches     Groin pain on R   Hip Flexor 3 x 30\" each Leg off table   HEPPelvic tilt 1 x 10 x 5\" holds With TA contraction   Hook lying rotation 10 x 10\"    Figure 4 3 x 30\" L Only KTOS   Seated lumbar flexion 3 x 20\" Strengthening     HEPHEPHEPHEP          HEP   HEPSide-lying clamshells 3 x 10 each 4# [place] 3 x 10 each 4#   SLR Abduction 3 x 10 each 2#   Bridge 2 x 10 x3\" holds Silver loop at knees  Cues for glute focus                                     Manual Intervention             Prone PA      GISTM/STM 8'  IASTM to left and Right lumbar  Paraspinals and QL   Lumbar Manip      SI Manip      Hip belt mobs      Hip LA distraction              Patient Education:   10/19/20: Patient educated about PT diagnosis, prognosis, and plan of care; educated on role of physical therapy; educated on HEP; educated on anatomy of lumbar spine / lumbopelvic positioning. HEP:  Patient instructed on HEP on this date with handout provided and all questions answered. Patient was instructed to contact PT with any complications or questions about HEP moving forward. Patient verbally stated she/he understood. Updated 12/14/20  [employer] CODE: [id number]    Therapeutic Exercise and NMR EXR  [x] (78693) Provided verbal/tactile cueing for activities related to strengthening, flexibility, endurance, ROM  for improvements in proximal hip and core control with self care, mobility, lifting and ambulation. [x] (88551) Provided verbal/tactile cueing for activities related to improving balance, coordination, kinesthetic sense, posture, motor skill, proprioception  to assist with core control in self care, mobility, lifting, and ambulation.      Therapeutic Activities:    [x] (96967 or 05306) Provided verbal/tactile cueing for activities related to improving balance, coordination, kinesthetic sense, posture, motor skill, proprioception and motor activation to allow for proper function  with self care and ADLs  [] (43727) Provided training and instruction to the patient for proper core and proximal hip recruitment and positioning with ambulation re-education     Home Exercise Program:    [x] (99004) Reviewed/Progressed HEP activities related to strengthening, flexibility, endurance, ROM of core, proximal hip and LE for functional [x] Progressing: [] Met: [] Not Met: [] Adjusted  2. Patient will demonstrate increased AROM to WNL, good LS mobility, good hip ROM to allow for proper joint functioning as indicated by patients Functional Deficits. [x] Progressing: [] Met: [] Not Met: [] Adjusted  3. Patient will demonstrate an increase in Strength to good proximal hip and core activation to allow for proper functional mobility as indicated by patients Functional Deficits. [x] Progressing: [] Met: [] Not Met: [] Adjusted  4. Patient will return to ADLs and other functional activities without increased symptoms or restriction. [x] Progressing: [] Met: [] Not Met: [] Adjusted  5. Patient will return to walking for >45 minutes without pain in lumbar spine / left gluteal region (patient specific functional goal)    [] Progressing: [] Met: [x] Not Met: [] Adjusted     Overall Progression Towards Functional goals/ Treatment Progress Update:  [x] Patient is progressing as expected towards functional goals listed. [] Progression is slowed due to complexities/Impairments listed. [] Progression has been slowed due to co-morbidities. [] Plan just implemented, too soon to assess goals progression <30days   [] Goals require adjustment due to lack of progress  [] Patient is not progressing as expected and requires additional follow up with physician  [] Other    Prognosis for POC: [x] Good [] Fair  [] Poor    Patient requires continued skilled intervention: [x] Yes  [] No    Treatment/Activity Tolerance:  [x] Patient able to complete treatment  [] Patient limited by fatigue  [] Patient limited by pain     [] Patient limited by other medical complications  [x] Other: Tolerated visit fairly well. Added lumbar flexion stretch seated in chair which patient was instructed not to lean too far and cause right groin pain. She verbalized upon completion that is caused groin pain despite PT telling her not to go that far.  Patient educated in depth about avoiding deep hip flexion on right side and avoiding any position that causes groin pain. She verbalized understanding. Still has significant increase in tone on bilateral lumbar extensors L more than R side. Significant erythema response with IASTM. Continue PT to improve patient's BLE flexibility, strength, and core strength in order to reduce pain in back and return her to PLOF. Prognosis: [] Good [x] Fair  [] Poor    Patient Requires Follow-up: [x] Yes  [] No    PLAN: See eval  [x] Continue per plan of care [] Alter current plan (see comments)  [] Plan of care initiated [] Hold pending MD visit [] Discharge    Electronically signed by: Toya Sagastume PT, DPT, OCS    *If patient does not return for further follow ups after this date. Please consider this as the patients discharge from physical therapy.

## 2020-12-21 ENCOUNTER — HOSPITAL ENCOUNTER (OUTPATIENT)
Dept: PHYSICAL THERAPY | Age: 75
Setting detail: THERAPIES SERIES
Discharge: HOME | End: 2020-12-21
Payer: MEDICARE

## 2020-12-21 NOTE — FLOWSHEET NOTE
The [facility]    Physical Therapy  Cancellation/No-show Note  Patient Name:  Indy Tariq  :     Date:  2020  Cancelled visits to date: 1  No-shows to date: 0    For today's appointment patient:  [x]  Cancelled  []  Rescheduled appointment  []  No-show     Reason given by patient:  [x]  Patient ill  []  Conflicting appointment   []  No transportation    []  Conflict with work  []  No reason given  []  Other:     Comments:      Electronically signed by:  Willie Winkler, PT, DPT, OCS

## 2020-12-28 ENCOUNTER — HOSPITAL ENCOUNTER (OUTPATIENT)
Dept: PHYSICAL THERAPY | Age: 75
Setting detail: THERAPIES SERIES
Discharge: HOME | End: 2020-12-28
Payer: MEDICARE

## 2020-12-28 PROCEDURE — 97110 THERAPEUTIC EXERCISES: CPT

## 2020-12-28 PROCEDURE — 97140 MANUAL THERAPY 1/> REGIONS: CPT

## 2020-12-28 NOTE — FLOWSHEET NOTE
[facility] [place] [address]        Physical Therapy Daily Treatment Note  Date:  2020    Patient Name:  Jose Manuel Crain   \"\"  :  3/18/1961  MRN: [id number]  Restrictions/Precautions:    Medical/Treatment Diagnosis Information:  · Diagnosis: M43.16 (ICD-10-CM) - Spondylolisthesis, lumbar region  · Treatment Diagnosis: M54.5 Lumbar Pain; M51.35 DDD  Insurance/Certification information:  PT Insurance Information: Medicare  Physician Information:  Referring Practitioner: Alexandro Johnson  Has the plan of care been signed (Y/N):        [x]  Yes  []  No     Date of Patient follow up with Physician: 20      Is this a Progress Report:     []  Yes  [x]  No        If Yes:  Date Range for reporting period:  Beginning:   Ending:     Progress report will be due (10 Rx or 30 days whichever is less):       Recertification will be due (POC Duration  / 90 days whichever is less): 21        Visit # Insurance Allowable Auth Required   16 Medicare []  Yes [x]  No        OUTCOME MEASURE DATE DEFICIT   QUEBEC 10/19/20 IE 39% Deficit   QUEBEC 20 20% Deficit   QUEBEC 20 20% Deficit   Patient given satisfaction survey? [] Yes   [] No    Latex Allergy:  [x]NO      []YES  Preferred Language for Healthcare:   [x]English       []other:    Pain level:  1/10 at rest  2-3/10 at worst in past week    SUBJECTIVE:  Patient reports she had pain still with walking yesterday in left lumbar and left gluteal area. This pain is unchanged since starting PT. She notes this occurs as well with any prolonged standing activity she did over the holiday week. Sees PA on Wednesday for follow up.        OBJECTIVE:     20  ROM   Comments   Trunk flexion Full Rib hump on R; pain on left lumbar paraspinals   Trunk extension 50% limited - painful General Lumbar pain    Trunk R sidebend Knee joint line Deviates into flexion;   Trunk L sidebend Knee join line Deviates into flexion; Feels tightness on R side   Trunk R rotation       Trunk L rotation       HS flexibility -10 degrees bilaterally 90/90   Quad flexibilty 110 degrees bilaterally Elys; limited by back pain               12/14/20  Strength Left Right Comments   Hip flexion(L2) 4+ 4+     Knee extension(L3) 5 5     Knee flexion(S1-2) 5 5     Ankle dorsiflexion(L4) 5 5     Toe extension(L5) NT NT     Ankle eversion/plantar flexion(S1) 5 5     Hip abd  4 4     Hip IR Hip ER 4+ 4+       10/19/20  Special tests   Comments   SLR Neg Just HS tightness   Slump test Pos - mild sciatic nerve hypomobility bilaterally     Pelvic symmetry  Neg     Segmental Spinal mobility Mild hypomobility L4 and L5 PAs     Heel walk NT     Toe walk NT     Sensation Intact LE dermatomes               12/14/20:  SIJ   Laslett Criteria   (3+ variables: 91-94% sen, 78-87% spec)  -SI distraction  - SI compression  - Thigh thrust  - Gaenslen's  - Sacral thrust    Ivet Tamara javier Wurff  (3+ variables: 85% sen, 79% spec)  -SI distraction  - SI compression  - Thigh thrust  - Gaenslen's  - JAMES: Only groin pain present; no SIJ pain         10/19/20 Deferred  DTRs Left Right Comments   Patellar(L3-L4)         Achilles(S1-S2)                      Joint mobility:               []? Normal               [x]? Hypo: decreased PAs L4 and L5                []? 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); has hearing aids both sides    Exercises/Interventions:   ROM/stretches     Groin pain on R   Hip Flexor 3 x 30\" each Leg off table   HEPPelvic tilt 2 x 10 x 5\" holds With TA contraction   Hook lying rotation 10 x 10\" Figure 4 3 x 30\" L Only KTOS   Cat Camel x20 Groin pain on RStrengthening     HEPHEPHEPHEP          HEP   HEPSide-lying clamshells 3 x 10 each 5#   Fish Tail 3 x 10 each 5#   SLR Abduction 3 x 10 each 2#   Bridge 2 x 10 x3\" holds Silver loop at knees  Cues for glute focus                                     Manual Intervention             Prone PA 2'     GISTM/STM 8'  IASTM to left and Right lumbar  Paraspinals and QL   Lumbar Manip      SI Manip      Hip belt mobs      Hip LA distraction              Patient Education:   10/19/20: Patient educated about PT diagnosis, prognosis, and plan of care; educated on role of physical therapy; educated on HEP; educated on anatomy of lumbar spine / lumbopelvic positioning. HEP:  Patient instructed on HEP on this date with handout provided and all questions answered. Patient was instructed to contact PT with any complications or questions about HEP moving forward. Patient verbally stated she/he understood. Updated 12/14/20  [employer] CODE: [id number]    Therapeutic Exercise and NMR EXR  [x] (48541) Provided verbal/tactile cueing for activities related to strengthening, flexibility, endurance, ROM  for improvements in proximal hip and core control with self care, mobility, lifting and ambulation. [x] (44709) Provided verbal/tactile cueing for activities related to improving balance, coordination, kinesthetic sense, posture, motor skill, proprioception  to assist with core control in self care, mobility, lifting, and ambulation.      Therapeutic Activities:    [x] (18188 or 69601) Provided verbal/tactile cueing for activities related to improving balance, coordination, kinesthetic sense, posture, motor skill, proprioception and motor activation to allow for proper function  with self care and ADLs  [] (66510) Provided training and instruction to the patient for proper core and proximal hip recruitment and positioning with ambulation re-education     Home Exercise Program: [x] (12491) Reviewed/Progressed HEP activities related to strengthening, flexibility, endurance, ROM of core, proximal hip and LE for functional self-care, mobility, lifting and ambulation   [] (26002) Reviewed/Progressed HEP activities related to improving balance, coordination, kinesthetic sense, posture, motor skill, proprioception of core, proximal hip and LE for self care, mobility, lifting, and ambulation      Manual Treatments:  PROM / STM / Oscillations-Mobs:  G-I, II, III, IV (PA's, Inf., Post.)  [x] (15366) Provided manual therapy to mobilize proximal hip and LS spine soft tissue/joints for the purpose of modulating pain, promoting relaxation,  increasing ROM, reducing/eliminating soft tissue swelling/inflammation/restriction, improving soft tissue extensibility and allowing for proper ROM for normal function with self care, mobility, lifting and ambulation. Modalities:       Charges:  Timed Code Treatment Minutes: 50   Total Treatment Minutes: 50   1105am-1156am  **KX MODIFIER**    [] EVAL (LOW) 09783 (typically 20 minutes face-to-face)  [] EVAL (MOD) 39512 (typically 30 minutes face-to-face)  [] EVAL (HIGH) 38612 (typically 45 minutes face-to-face)  [] RE-EVAL     [x] XM(01892) x  2  [] IONTO  [] NMR (85436) x  1   [] VASO  [x] Manual (85277) x 1     [] Other:  [] TA x 1     [] Mech Traction (47654)  [] ES(attended) (23621)      [] ES (un) (68547):     GOALS:   Patient stated goal: Return to walking without pain    [] Progressing: [] Met: [x] Not Met: [] Adjusted    Therapist goals for Patient:   Short Term Goals: To be achieved in: 2 weeks  1. Independent in HEP and progression per patient tolerance, in order to prevent re-injury. [] Progressing: [x] Met: [] Not Met: [] Adjusted   2. Patient will have a decrease in pain to facilitate improvement in movement, function, and ADLs as indicated by Functional Deficits.     [] Progressing: [x] Met: [] Not Met: [] Adjusted Long Term Goals: To be achieved in: 8 weeks  1. Disability index score of 19.5% or less for the [place] to assist with reaching prior level of function. [x] Progressing: [] Met: [] Not Met: [] Adjusted  2. Patient will demonstrate increased AROM to WNL, good LS mobility, good hip ROM to allow for proper joint functioning as indicated by patients Functional Deficits. [x] Progressing: [] Met: [] Not Met: [] Adjusted  3. Patient will demonstrate an increase in Strength to good proximal hip and core activation to allow for proper functional mobility as indicated by patients Functional Deficits. [x] Progressing: [] Met: [] Not Met: [] Adjusted  4. Patient will return to ADLs and other functional activities without increased symptoms or restriction. [x] Progressing: [] Met: [] Not Met: [] Adjusted  5. Patient will return to walking for >45 minutes without pain in lumbar spine / left gluteal region (patient specific functional goal)    [] Progressing: [] Met: [x] Not Met: [] Adjusted     Overall Progression Towards Functional goals/ Treatment Progress Update:  [x] Patient is progressing as expected towards functional goals listed. [] Progression is slowed due to complexities/Impairments listed. [] Progression has been slowed due to co-morbidities.   [] Plan just implemented, too soon to assess goals progression <30days   [] Goals require adjustment due to lack of progress  [] Patient is not progressing as expected and requires additional follow up with physician  [] Other    Prognosis for POC: [x] Good [] Fair  [] Poor    Patient requires continued skilled intervention: [x] Yes  [] No    Treatment/Activity Tolerance:  [x] Patient able to complete treatment  [] Patient limited by fatigue  [] Patient limited by pain     [] Patient limited by other medical complications [x] Other: added cat-camel stretch for lumbar flexion/extension at end of visit to help with gentle ROM. Still unable to tolerate and direct flexion stretches to lumbar spine as it causes compression at right hip / right groin pain to occur. Still very restricted throughout R and L paraspinals and QL musculature with immediate erythema with IASTM to both sides. Hip strength progressing well. Much less compensation noted with side-lying SLR hip abduction than at previous visits. Continue PT to improve patient's BLE flexibility, strength, and core strength in order to reduce pain in back and return her to PLOF. Prognosis: [] Good [x] Fair  [] Poor    Patient Requires Follow-up: [x] Yes  [] No    PLAN: See eval  [x] Continue per plan of care [] Alter current plan (see comments)  [] Plan of care initiated [] Hold pending MD visit [] Discharge    Electronically signed by: Jeanmarie Corado PT, DPT, OCS    *If patient does not return for further follow ups after this date. Please consider this as the patients discharge from physical therapy.

## 2020-12-30 ENCOUNTER — OFFICE VISIT (OUTPATIENT)
Dept: ORTHOPEDIC SURGERY | Age: 75
End: 2020-12-30
Payer: MEDICARE

## 2020-12-30 VITALS — TEMPERATURE: 97.3 F | BODY MASS INDEX: 20.96 KG/M2 | HEIGHT: 61 IN | RESPIRATION RATE: 12 BRPM | WEIGHT: 111 LBS

## 2020-12-30 PROCEDURE — G8484 FLU IMMUNIZE NO ADMIN: HCPCS | Performed by: PHYSICIAN ASSISTANT

## 2020-12-30 PROCEDURE — 3017F COLORECTAL CA SCREEN DOC REV: CPT | Performed by: PHYSICIAN ASSISTANT

## 2020-12-30 PROCEDURE — 1123F ACP DISCUSS/DSCN MKR DOCD: CPT | Performed by: PHYSICIAN ASSISTANT

## 2020-12-30 PROCEDURE — 1090F PRES/ABSN URINE INCON ASSESS: CPT | Performed by: PHYSICIAN ASSISTANT

## 2020-12-30 PROCEDURE — 1036F TOBACCO NON-USER: CPT | Performed by: PHYSICIAN ASSISTANT

## 2020-12-30 PROCEDURE — G8400 PT W/DXA NO RESULTS DOC: HCPCS | Performed by: PHYSICIAN ASSISTANT

## 2020-12-30 PROCEDURE — 4040F PNEUMOC VAC/ADMIN/RCVD: CPT | Performed by: PHYSICIAN ASSISTANT

## 2020-12-30 PROCEDURE — G8420 CALC BMI NORM PARAMETERS: HCPCS | Performed by: PHYSICIAN ASSISTANT

## 2020-12-30 PROCEDURE — G8427 DOCREV CUR MEDS BY ELIG CLIN: HCPCS | Performed by: PHYSICIAN ASSISTANT

## 2020-12-30 PROCEDURE — 99213 OFFICE O/P EST LOW 20 MIN: CPT | Performed by: PHYSICIAN ASSISTANT

## 2020-12-30 RX ORDER — LIFITEGRAST 50 MG/ML
SOLUTION/ DROPS OPHTHALMIC
COMMUNITY

## 2020-12-30 NOTE — PROGRESS NOTES
Follow up: Lauren Funk  [birth year]  [id number]         Chief Complaint   Patient presents with    Lower Back Pain     F/u [facility]; 10/19 - 12/28 for a total 16 visits         HISTORY OF PRESENT ILLNESS:  Ms. Lydia Hebert is a 70 y.o. female returns for a follow up visit for multiple medical problems. Her current presenting problems are   1. Sacroiliac joint dysfunction of left side    2. Adolescent idiopathic scoliosis of thoracolumbar region    3. Spondylolisthesis, lumbar region    4. Spondylosis of lumbar region without myelopathy or radiculopathy    5. Lumbar radiculopathy    6. DDD (degenerative disc disease), lumbar    . As per information/history obtained from the PADT(patient assessment and documentation tool) - She complains of pain in the lower back with radiation to the buttocks She rates the pain 2/10 and describes it as aching. Pain is made worse by: walking, standing. She denies side effects from the current pain regimen. Patient reports that since the last follow up visit the physical functioning is better, family/social relationships are better, mood is better and sleep patterns are better, and that the overall functioning is better. Patient denies neurological bowel or bladder. The patient presents today in the office after last being seen on 11/5/2020. The patient was scheduled for a left SI joint injection on 12/1/2020 which was canceled by the patient. The patient has continued with physical therapy from 11/13/2020 through 12/28/2020 for total of 12 visits. The patient describes that physical therapy has helped considerably with her pain. She describes that she has not been walking as much which is what really aggravates the left-sided gluteal pain. She also describes that standing for over a half an hour increases her pain. The patient describes that sitting with a support pillow improves her pain considerably.   The patient does not wish to proceed with the injection at this time she would like to see how her pain level is over the next few months. Associated signs and symptoms:   Neurogenic bowel or bladder symptoms:  no   Perceived weakness:  no   Difficulty walking:  no            Past medical, surgical, social and family history reviewed with the patient. Past Medical History:   Past Medical History:   Diagnosis Date    Arthritis     Environmental allergies     Histoplasmosis     right eye     Hyperlipidemia     Medical history reviewed with no changes     PONV (postoperative nausea and vomiting)       Past Surgical History:     Past Surgical History:   Procedure Laterality Date    CATARACT REMOVAL      MOHS SURGERY      nose- basal cell     TOTAL HIP ARTHROPLASTY Right 1/7/2019    RIGHT TOTAL HIP ARTHROPLASTY; PRP INJECTION OF RIGHT HIP performed by Bria Erazo MD at [address] [place] [address]       Current Medications:     Current Outpatient Medications:     Lifitegrast (XIIDRA) 5 % SOLN, Apply to eye, Disp: , Rfl:     Loratadine (CLARITIN PO), Take by mouth, Disp: , Rfl:     Calcium Carb-Cholecalciferol (CALCIUM 600+D3 PO), Take by mouth, Disp: , Rfl:     Cholecalciferol (VITAMIN D-3 PO), Take 1,000 Units by mouth, Disp: , Rfl:     Probiotic Product (ALIGN PO), Take by mouth, Disp: , Rfl:     Glycerin-Polysorbate 80 (REFRESH DRY EYE THERAPY OP), Apply to eye, Disp: , Rfl:     clindamycin (CLEOCIN) 150 MG capsule, Take 1 capsule by mouth See Admin Instructions for 10 doses Take 4 capsules 1 hour prior to any surgical procedure, Disp: 40 capsule, Rfl: 1    vitamin C (ASCORBIC ACID) 500 MG tablet, Take 1,000 mg by mouth daily, Disp: , Rfl:     simvastatin (ZOCOR) 20 MG tablet, Take 20 mg by mouth nightly , Disp: , Rfl:   Allergies:  Patient has no known allergies. Social History:    reports that she has never smoked. She has never used smokeless tobacco. She reports that she does not drink alcohol or use drugs.   Family History: Family History   Family history unknown: Yes       REVIEW OF SYSTEMS:   CONSTITUTIONAL: Denies unexplained weight loss, fevers, chills or fatigue  NEUROLOGICAL: Denies unsteady gait or progressive weakness  MUSCULOSKELETAL: Denies joint swelling or redness  GI: Denies nausea, vomiting, diarrhea   : Denies bowel or bladder issues       PHYSICAL EXAM:    Vitals: Temperature 97.3 °F (36.3 °C), resp. rate 12, height 5' 0.98\" (1.549 m), weight 111 lb (50.3 kg). GENERAL EXAM:  · General Apparence: Patient is adequately groomed with no evidence of malnutrition. · Psychiatric: Orientation: The patient is oriented to time, place and person. The patient's mood and affect are appropriate   · Vascular: Examination reveals no swelling and palpation reveals no tenderness in upper or lower extremities. Good capillary refill. · Sensation is intact without deficit in the upper and lower extremities to light touch. · Coordination of the upper and lower extremities are normal.  · RIGHT UPPER EXTREMITY:  Inspection/examination of the right upper extremity does not show any tenderness, deformity or injury. Range of motion is unremarkable and pain-free. There is no gross instability. There are no rashes, ulcerations or lesions. Strength and tone are normal. No atrophy or abnormal movements are noted. · LEFT UPPER EXTREMITY: Inspection/examination of the left upper extremity does not show any tenderness, deformity or injury. Range of motion is unremarkable and pain-free. There is no gross instability. There are no rashes, ulcerations or lesions. Strength and tone are normal. No atrophy or abnormal movements are noted. LUMBAR/SACRAL EXAMINATION:  · Inspection: Local inspection shows no step-off or bruising. Lumbar alignment is normal. No instability is noted. · Palpation:   No evidence of tenderness at the midline.   Lumbar paraspinal tenderness: Mild L4/5 and L5/S1 tenderness  Bursal tenderness No tenderness bilaterally There is no paraspinal spasm. · Range of Motion: limited by 25% in all planes due to pain  · Strength:   Strength testing is 5/5 in all muscle groups tested. · Special Tests:   Straight leg raise and crossed SLR negative. Harry's testing is negative bilaterally. FADIR's testing is negative bilaterally. · Skin: There are no rashes, ulcerations or lesions. · Reflexes: Reflexes are symmetrically 2+ at the patellar and ankle tendons. Clonus absent bilaterally at the feet. · Gait & station: normal, patient ambulates without assistance and no ataxia  · Additional Examinations:  · RIGHT LOWER EXTREMITY: Inspection/examination of the right lower extremity does not show any tenderness, deformity or injury. Range of motion is normal and pain-free. There is no gross instability. There are no rashes, ulcerations or lesions. Strength and tone are normal. No atrophy or abnormal movements are noted. · LEFT LOWER EXTREMITY:  Inspection/examination of the left lower extremity does not show any tenderness, deformity or injury. Range of motion is normal and pain-free. There is no gross instability. There are no rashes, ulcerations or lesions. Strength and tone are normal. No atrophy or abnormal movements are noted. Diagnostic Testing:    MR Lumbar spine shows from 9/29/20:  FINDINGS:   ALIGNMENT: Prominent levoscoliotic curvature centered L4 vertebra. Compensatory convex right    curvature noted more superiorly. BONE MARROW: Unremarkable.  No aggressive osseous lesion or fracture   CONUS:  Unremarkable       DISC LEVELS:       T11-T12: Mild broad-based disc bulging causes mild central canal stenosis. No significant    foraminal narrowing. T12-L1:  Broad-based disc bulging and mild facet arthropathy. Mild central canal stenosis. Moderate left and no significant right foraminal compromise.      L1-2:      1 to 2 mm retrolisthesis. Broad-based disc bulging and mild facet arthropathy. Mild    central canal stenosis. Mild to moderate left and right foraminal compromise.     L2-3:      2 mm retrolisthesis. Broad-based disc bulging and mild facet arthropathy. Mild    central canal stenosis. Mild left and moderate right foraminal compromise.       L3-4:      2 to 3 mm retrolisthesis. Broad-based disc bulging mild facet arthropathy. Mild    central canal stenosis. Moderate to severe right and mild left foraminal narrowing. Correlation    right L3 radiculopathy.     L4-5:      2 mm anterolisthesis. No spondylolysis. Broad-based disc bulging and moderate to    severe facet arthropathy. Mild central canal stenosis. Moderate to severe left and moderate    right foraminal narrowing. Correlation left L4 radiculopathy.      L5-S1:    Broad-based disc bulging and mild facet arthropathy. Small central focal protrusion. Mild central canal stenosis. Mild to moderate right and severe left foraminal narrowing. Correlation left L5 radiculopathy.           LUMBOSACRAL JUNCTION: Counting sequence used for this study annotated on exam.    SACRUM AND SI JOINTS:  Unremarkable   OTHER:  None           IMPRESSION:           Multilevel degenerative changes as described. Findings appear exacerbated by scoliotic change. No severe central canal stenosis seen. There is prominent foraminal narrowing seen at the L3-L4    through L5-S1 levels and correlation for L3-L5 radiculopathy    recommended as described.        Results for orders placed or performed during the hospital encounter of 28/42/66   Basic Metabolic Panel   Result Value Ref Range    Sodium 136 136 - 145 mmol/L    Potassium 4.9 3.5 - 5.1 mmol/L    Chloride 101 99 - 110 mmol/L    CO2 28 21 - 32 mmol/L    Anion Gap 7 3 - 16    Glucose 135 (H) 70 - 99 mg/dL    BUN 10 7 - 20 mg/dL    CREATININE <0.5 (L) 0.6 - 1.2 mg/dL    GFR Non-African American >60 >60    GFR African American >60 >60    Calcium 9.0 8.3 - 10.6 mg/dL   Hemoglobin and hematocrit, blood   Result Value Ref Range    Hemoglobin 10.2 (L) 12.0 - 16.0 g/dL    Hematocrit 31.0 (L) 36.0 - 48.0 %   Basic Metabolic Panel   Result Value Ref Range    Sodium 136 136 - 145 mmol/L    Potassium 4.1 3.5 - 5.1 mmol/L    Chloride 100 99 - 110 mmol/L    CO2 28 21 - 32 mmol/L    Anion Gap 8 3 - 16    Glucose 96 70 - 99 mg/dL    BUN 10 7 - 20 mg/dL    CREATININE <0.5 (L) 0.6 - 1.2 mg/dL    GFR Non-African American >60 >60    GFR African American >60 >60    Calcium 9.0 8.3 - 10.6 mg/dL   Hemoglobin and hematocrit, blood   Result Value Ref Range    Hemoglobin 10.2 (L) 12.0 - 16.0 g/dL    Hematocrit 30.3 (L) 36.0 - 48.0 %     Impression:       1. Sacroiliac joint dysfunction of left side    2. Adolescent idiopathic scoliosis of thoracolumbar region    3. Spondylolisthesis, lumbar region    4. Spondylosis of lumbar region without myelopathy or radiculopathy    5. Lumbar radiculopathy    6. DDD (degenerative disc disease), lumbar        Plan:  Clinical Course: Above diagnoses are improving     I discussed the diagnosis and the treatment options with Jose Manuel Crain today. In Summary:  The various treatment options were outlined and discussed with Jose Manuel Crain including:  Conservative care options: physical therapy, ice, medications, bracing, and activity modification. The indications for therapeutic injections. The indications for additional imaging/laboratory studies. The indications for (possible future) interventions. After considering the various options discussed, Jose Manuel Crain elected to pursue a course of treatment that includes the followin. Medications:  No further recommendations for new medications. 2. PT:  Encouraged to continue with Home exercise program.    3. Further studies: No further studies. 4. Interventional:  No further interventions at this time. 5. Follow up:  3 months. Jose Manuel Crain was instructed to call the office if her symptoms worsen or if new symptoms appear prior to the next scheduled visit. She is specifically instructed to contact the office between now & her scheduled appointment if she has concerns related to her condition or if she needs assistance in scheduling the above tests. She is welcome to call for an appointment sooner if she has any additional concerns or questions. PORSCHE Robison, TAYLOR  Board Certified by the [employer] on Certification of Physician Assistants  [address] [facility]  Partner of [facility] ([facility])         This dictation was performed with a verbal recognition program [facility]) and it was checked for errors. It is possible that there are still dictated errors within this office note. If so, please bring any errors to my attention for an addendum. All efforts were made to ensure that this office note is accurate.

## 2021-01-04 ENCOUNTER — HOSPITAL ENCOUNTER (OUTPATIENT)
Dept: PHYSICAL THERAPY | Age: 76
Setting detail: THERAPIES SERIES
Discharge: HOME | End: 2021-01-04
Payer: MEDICARE

## 2021-01-04 PROCEDURE — 97140 MANUAL THERAPY 1/> REGIONS: CPT

## 2021-01-04 PROCEDURE — 97110 THERAPEUTIC EXERCISES: CPT

## 2021-01-04 NOTE — FLOWSHEET NOTE
[facility] [place] [address]        Physical Therapy Daily Treatment Note  Date:  2021    Patient Name:  Cassie Henry   \"\"  :  [age]  MRN: [id number]  Restrictions/Precautions:    Medical/Treatment Diagnosis Information:  · Diagnosis: M43.16 (ICD-10-CM) - Spondylolisthesis, lumbar region  · Treatment Diagnosis: M54.5 Lumbar Pain; M51.35 DDD  Insurance/Certification information:  PT Insurance Information: Medicare  Physician Information:  Referring Practitioner: Tracey Valencia  Has the plan of care been signed (Y/N):        [x]  Yes  []  No     Date of Patient follow up with Physician: 20      Is this a Progress Report:     []  Yes  [x]  No        If Yes:  Date Range for reporting period:  Beginning:   Ending:     Progress report will be due (10 Rx or 30 days whichever is less): 74      Recertification will be due (POC Duration  / 90 days whichever is less): 21        Visit # Insurance Allowable Auth Required    ()   () Medicare []  Yes [x]  No        OUTCOME MEASURE DATE DEFICIT   QUEBEC 10/19/20 IE 39% Deficit   QUEBEC 20 20% Deficit   QUEBEC 20 20% Deficit   Patient given satisfaction survey? [] Yes   [] No    Latex Allergy:  [x]NO      []YES  Preferred Language for Healthcare:   [x]English       []other:    Pain level:  1/10 at rest  2-3/10 at worst in past week    SUBJECTIVE:  Patient reports she saw PA last week and told her she still has buttocks pain and LBP with walking. Overall still feels like she is improving. PA agreed SIJ injection not appropriate at this time. Suggested possible nerve block/ injection in spring if back pain still an issue.          OBJECTIVE:     20  ROM   Comments   Trunk flexion Full Rib hump on R; pain on left lumbar paraspinals   Trunk extension 50% limited - painful General Lumbar pain    Trunk R sidebend Knee joint line Deviates into flexion; Trunk L sidebend Knee join line Deviates into flexion; Feels tightness on R side   Trunk R rotation       Trunk L rotation       HS flexibility -10 degrees bilaterally 90/90   Quad flexibilty 110 degrees bilaterally Elys; limited by back pain               12/14/20  Strength Left Right Comments   Hip flexion(L2) 4+ 4+     Knee extension(L3) 5 5     Knee flexion(S1-2) 5 5     Ankle dorsiflexion(L4) 5 5     Toe extension(L5) NT NT     Ankle eversion/plantar flexion(S1) 5 5     Hip abd  4 4     Hip IR Hip ER 4+ 4+       10/19/20  Special tests   Comments   SLR Neg Just HS tightness   Slump test Pos - mild sciatic nerve hypomobility bilaterally     Pelvic symmetry  Neg     Segmental Spinal mobility Mild hypomobility L4 and L5 PAs     Heel walk NT     Toe walk NT     Sensation Intact LE dermatomes               12/14/20:  SIJ   Laslett Criteria   (3+ variables: 91-94% sen, 78-87% spec)  -SI distraction  - SI compression  - Thigh thrust  - Gaenslen's  - Sacral thrust    Kimberlyn Gomes javier Wurff  (3+ variables: 85% sen, 79% spec)  -SI distraction  - SI compression  - Thigh thrust  - Gaenslen's  - JAMES: Only groin pain present; no SIJ pain         10/19/20 Deferred  DTRs Left Right Comments   Patellar(L3-L4)         Achilles(S1-S2)                      Joint mobility:               []? Normal               [x]? Hypo: decreased PAs L4 and L5                []? 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); has hearing aids both sides    Exercises/Interventions:   ROM/stretches     SKTC 3 x 30\" each Cues to Avoid groin pain on R   Hip Flexor 3 x 30\" each Leg off table HEPPelvic tilt 2 x 10 x 5\" holds With TA contraction   Hook lying rotation 10 x 10\"    Figure 4 3 x 30\" L Only KTOS   Groin pain on RStrengthening     HEPHEPHEPHEP          HEP   HEPSide-lying clamshells 3 x 15 each 5#   Fish Tail 3 x 15 each 5#   SLR Abduction 4 x 10 each 2#   Bridge 2 x 10 x3\" holds Silver loop at knees  Cues for glute focus                                     Manual Intervention             Prone PA 2'     GISTM/STM 8'  IASTM to left and Right lumbar  Paraspinals and QL   Lumbar Manip      SI Manip      Hip belt mobs      Hip LA distraction              Patient Education:   10/19/20: Patient educated about PT diagnosis, prognosis, and plan of care; educated on role of physical therapy; educated on HEP; educated on anatomy of lumbar spine / lumbopelvic positioning. HEP:  Patient instructed on HEP on this date with handout provided and all questions answered. Patient was instructed to contact PT with any complications or questions about HEP moving forward. Patient verbally stated she/he understood. Updated 1/4/21  [facility] CODE: [id number]    Therapeutic Exercise and NMR EXR  [x] (72648) Provided verbal/tactile cueing for activities related to strengthening, flexibility, endurance, ROM  for improvements in proximal hip and core control with self care, mobility, lifting and ambulation. [x] (07794) Provided verbal/tactile cueing for activities related to improving balance, coordination, kinesthetic sense, posture, motor skill, proprioception  to assist with core control in self care, mobility, lifting, and ambulation.      Therapeutic Activities:    [x] (59141 or 28555) Provided verbal/tactile cueing for activities related to improving balance, coordination, kinesthetic sense, posture, motor skill, proprioception and motor activation to allow for proper function  with self care and ADLs [] (92988) Provided training and instruction to the patient for proper core and proximal hip recruitment and positioning with ambulation re-education     Home Exercise Program:    [x] (72125) Reviewed/Progressed HEP activities related to strengthening, flexibility, endurance, ROM of core, proximal hip and LE for functional self-care, mobility, lifting and ambulation   [] (96270) Reviewed/Progressed HEP activities related to improving balance, coordination, kinesthetic sense, posture, motor skill, proprioception of core, proximal hip and LE for self care, mobility, lifting, and ambulation      Manual Treatments:  PROM / STM / Oscillations-Mobs:  G-I, II, III, IV (PA's, Inf., Post.)  [x] (66445) Provided manual therapy to mobilize proximal hip and LS spine soft tissue/joints for the purpose of modulating pain, promoting relaxation,  increasing ROM, reducing/eliminating soft tissue swelling/inflammation/restriction, improving soft tissue extensibility and allowing for proper ROM for normal function with self care, mobility, lifting and ambulation. Modalities:       Charges:  Timed Code Treatment Minutes: 40   Total Treatment Minutes: 40   [id number]    [] EVAL (LOW) 26936 (typically 20 minutes face-to-face)  [] EVAL (MOD) 37787 (typically 30 minutes face-to-face)  [] EVAL (HIGH) 94681 (typically 45 minutes face-to-face)  [] RE-EVAL     [x] TK(96496) x  2  [] IONTO  [] NMR (36485) x  1   [] VASO  [x] Manual (88551) x 1     [] Other:  [] TA x 1     [] Mech Traction (65282)  [] ES(attended) (60566)      [] ES (un) (28492):     GOALS:   Patient stated goal: Return to walking without pain    [] Progressing: [] Met: [x] Not Met: [] Adjusted    Therapist goals for Patient:   Short Term Goals: To be achieved in: 2 weeks  1. Independent in HEP and progression per patient tolerance, in order to prevent re-injury.      [] Progressing: [x] Met: [] Not Met: [] Adjusted 2. Patient will have a decrease in pain to facilitate improvement in movement, function, and ADLs as indicated by Functional Deficits. [] Progressing: [x] Met: [] Not Met: [] Adjusted    Long Term Goals: To be achieved in: 8 weeks  1. Disability index score of 19.5% or less for the [place] to assist with reaching prior level of function. [x] Progressing: [] Met: [] Not Met: [] Adjusted  2. Patient will demonstrate increased AROM to WNL, good LS mobility, good hip ROM to allow for proper joint functioning as indicated by patients Functional Deficits. [x] Progressing: [] Met: [] Not Met: [] Adjusted  3. Patient will demonstrate an increase in Strength to good proximal hip and core activation to allow for proper functional mobility as indicated by patients Functional Deficits. [x] Progressing: [] Met: [] Not Met: [] Adjusted  4. Patient will return to ADLs and other functional activities without increased symptoms or restriction. [x] Progressing: [] Met: [] Not Met: [] Adjusted  5. Patient will return to walking for >45 minutes without pain in lumbar spine / left gluteal region (patient specific functional goal)    [] Progressing: [] Met: [x] Not Met: [] Adjusted     Overall Progression Towards Functional goals/ Treatment Progress Update:  [x] Patient is progressing as expected towards functional goals listed. [] Progression is slowed due to complexities/Impairments listed. [] Progression has been slowed due to co-morbidities.   [] Plan just implemented, too soon to assess goals progression <30days   [] Goals require adjustment due to lack of progress  [] Patient is not progressing as expected and requires additional follow up with physician  [] Other    Prognosis for POC: [x] Good [] Fair  [] Poor    Patient requires continued skilled intervention: [x] Yes  [] No    Treatment/Activity Tolerance:  [x] Patient able to complete treatment  [] Patient limited by fatigue [] Patient limited by pain     [] Patient limited by other medical complications  [x] Other: tolerated program well. Had significant fatigue with increase in reps to 3 x 15 for hip exercises. Discussed purchasing ankle weights for home to maintain hip strength long-term. Still has difficulty with flexion stretches due to underlying hip replacement and groin pain that develops. Restrictions with IASTM were more right sided today in paraspinals and QL which correlates with patient's subjective complaints. Plan for one more follow up in 3 weeks after she sees Dr. Edgard Ashley for hip and possible d/c to HEP at that time. Continue PT to improve patient's BLE flexibility, strength, and core strength in order to reduce pain in back and return her to PLOF. Prognosis: [] Good [x] Fair  [] Poor    Patient Requires Follow-up: [x] Yes  [] No    PLAN: See eval  [x] Continue per plan of care [] Alter current plan (see comments)  [] Plan of care initiated [] Hold pending MD visit [] Discharge    Electronically signed by: Lakia Clinton PT, DPT, OCS    *If patient does not return for further follow ups after this date. Please consider this as the patients discharge from physical therapy.

## 2021-01-14 ENCOUNTER — OFFICE VISIT (OUTPATIENT)
Dept: ORTHOPEDIC SURGERY | Age: 76
End: 2021-01-14
Payer: MEDICARE

## 2021-01-14 VITALS
HEIGHT: 60 IN | BODY MASS INDEX: 21.79 KG/M2 | TEMPERATURE: 97.2 F | DIASTOLIC BLOOD PRESSURE: 76 MMHG | HEART RATE: 88 BPM | SYSTOLIC BLOOD PRESSURE: 122 MMHG | WEIGHT: 111 LBS

## 2021-01-14 DIAGNOSIS — Z96.641 S/P HIP REPLACEMENT, RIGHT: Primary | ICD-10-CM

## 2021-01-14 PROCEDURE — 1036F TOBACCO NON-USER: CPT | Performed by: ORTHOPAEDIC SURGERY

## 2021-01-14 PROCEDURE — 1123F ACP DISCUSS/DSCN MKR DOCD: CPT | Performed by: ORTHOPAEDIC SURGERY

## 2021-01-14 PROCEDURE — G8484 FLU IMMUNIZE NO ADMIN: HCPCS | Performed by: ORTHOPAEDIC SURGERY

## 2021-01-14 PROCEDURE — G8427 DOCREV CUR MEDS BY ELIG CLIN: HCPCS | Performed by: ORTHOPAEDIC SURGERY

## 2021-01-14 PROCEDURE — G8400 PT W/DXA NO RESULTS DOC: HCPCS | Performed by: ORTHOPAEDIC SURGERY

## 2021-01-14 PROCEDURE — 99213 OFFICE O/P EST LOW 20 MIN: CPT | Performed by: ORTHOPAEDIC SURGERY

## 2021-01-14 PROCEDURE — 3017F COLORECTAL CA SCREEN DOC REV: CPT | Performed by: ORTHOPAEDIC SURGERY

## 2021-01-14 PROCEDURE — 4040F PNEUMOC VAC/ADMIN/RCVD: CPT | Performed by: ORTHOPAEDIC SURGERY

## 2021-01-14 PROCEDURE — G8420 CALC BMI NORM PARAMETERS: HCPCS | Performed by: ORTHOPAEDIC SURGERY

## 2021-01-14 PROCEDURE — 1090F PRES/ABSN URINE INCON ASSESS: CPT | Performed by: ORTHOPAEDIC SURGERY

## 2021-01-14 RX ORDER — PREDNISONE 1 MG/1
5 TABLET ORAL DAILY
Qty: 20 TABLET | Refills: 0 | Status: SHIPPED | OUTPATIENT
Start: 2021-01-14 | End: 2021-02-03

## 2021-01-14 NOTE — PROGRESS NOTES
activities and exercise program.  Visit to return in 2 year for repeat evaluation and x-rays of the hip.left hip without major djd.        ICD-10-CM    1. S/P hip replacement, right  Z96.641 XR HIP 2-3 VW W PELVIS RIGHT

## 2021-01-25 ENCOUNTER — HOSPITAL ENCOUNTER (OUTPATIENT)
Dept: PHYSICAL THERAPY | Age: 76
Setting detail: THERAPIES SERIES
Discharge: HOME | End: 2021-01-25
Payer: MEDICARE

## 2021-01-25 NOTE — FLOWSHEET NOTE
[facility]    Physical Therapy  Cancellation/No-show Note  Patient Name:  Rashid Gustafson  :     Date:  2021  Cancelled visits to date: 2  No-shows to date: 0    For today's appointment patient:  [x]  Cancelled  []  Rescheduled appointment  []  No-show     Reason given by patient:  [x]  Patient ill  []  Conflicting appointment   []  No transportation    []  Conflict with work  []  No reason given  []  Other:     Comments:      Electronically signed by:  Gela Chambers, PT, DPT, OCS

## 2021-02-19 ENCOUNTER — HOSPITAL ENCOUNTER (OUTPATIENT)
Dept: PHYSICAL THERAPY | Age: 76
Setting detail: THERAPIES SERIES
Discharge: HOME | End: 2021-02-19
Payer: MEDICARE

## 2021-02-19 PROCEDURE — 97140 MANUAL THERAPY 1/> REGIONS: CPT

## 2021-02-19 PROCEDURE — 97110 THERAPEUTIC EXERCISES: CPT

## 2021-02-19 NOTE — DISCHARGE SUMMARY
[facility]       Physical Therapy Discharge Note  Date:  2021    Patient Name:  Rafat Wan   \"\"  :  [age]  MRN: [id number]  Restrictions/Precautions:    Medical/Treatment Diagnosis Information:  · Diagnosis: M43.16 (ICD-10-CM) - Spondylolisthesis, lumbar region  · Treatment Diagnosis: M54.5 Lumbar Pain; M51.35 DDD  Insurance/Certification information:  PT Insurance Information: Medicare  Physician Information:  Referring Practitioner: Damari Sanchez  Has the plan of care been signed (Y/N):        [x]  Yes  []  No     Date of Patient follow up with Physician: not scheduled      Is this a Progress Report:     [x]  Yes  []  No        If Yes:  Date Range for reporting period:  Beginning: 10/19/20  Endin21    Progress report will be due (10 Rx or 30 days whichever is less): n/a      Recertification will be due (POC Duration  / 90 days whichever is less): n/a        Visit # Insurance Allowable Auth Required    ()  16 () Medicare []  Yes [x]  No        OUTCOME MEASURE DATE DEFICIT   QUEBEC 10/19/20 IE 39% Deficit   QUEBEC 20 20% Deficit   QUEBEC 20 20% Deficit   QUEBEC 21 13% Deficit   Patient given satisfaction survey? [] Yes   [] No    Latex Allergy:  [x]NO      []YES  Preferred Language for Healthcare:   [x]English       []other:    Pain level:  0/10 at rest  2-3/10 at worst in past week    SUBJECTIVE:  Patient returns to first PT appointment since 21. She has been out working through a few other medical issues. Overall she is feeling fairly well in regards to her pain. Has been able to do a few walks outside for about 30 minutes without much pain or restrictions. Feels good about her HEP, tries to stretch everyday and does strength 1-2x/week. Also uses heat on her low back a few times a day which helps. She does her ADLs in small pieces to help prevent onset of back pain.  Saw Dr. Taya Navarro for follow up of her kinesthetic sense, posture, motor skill, proprioception and motor activation to allow for proper function  with self care and ADLs  [] (80376) Provided training and instruction to the patient for proper core and proximal hip recruitment and positioning with ambulation re-education     Home Exercise Program:    [x] (90186) Reviewed/Progressed HEP activities related to strengthening, flexibility, endurance, ROM of core, proximal hip and LE for functional self-care, mobility, lifting and ambulation   [] (27178) Reviewed/Progressed HEP activities related to improving balance, coordination, kinesthetic sense, posture, motor skill, proprioception of core, proximal hip and LE for self care, mobility, lifting, and ambulation      Manual Treatments:  PROM / STM / Oscillations-Mobs:  G-I, II, III, IV (PA's, Inf., Post.)  [x] (30405) Provided manual therapy to mobilize proximal hip and LS spine soft tissue/joints for the purpose of modulating pain, promoting relaxation,  increasing ROM, reducing/eliminating soft tissue swelling/inflammation/restriction, improving soft tissue extensibility and allowing for proper ROM for normal function with self care, mobility, lifting and ambulation. Modalities:       Charges:  Timed Code Treatment Minutes: 40   Total Treatment Minutes: 21   [phone]    [] EVAL (LOW) 46228 (typically 20 minutes face-to-face)  [] EVAL (MOD) 92433 (typically 30 minutes face-to-face)  [] EVAL (HIGH) 22493 (typically 45 minutes face-to-face)  [] RE-EVAL     [x] GH(20168) x  2  [] IONTO  [] NMR (39886) x  1   [] VASO  [x] Manual (46447) x 1     [] Other:  [] TA x 1     [] Mech Traction (05526)  [] ES(attended) (91909)      [] ES (un) (99044):     GOALS:   Patient stated goal: Return to walking without pain    [] Progressing: [] Met: [x] Not Met: [] Adjusted    Therapist goals for Patient:   Short Term Goals: To be achieved in: 2 weeks  1.  Independent in HEP and progression per patient tolerance, in order to prevent re-injury. [] Progressing: [x] Met: [] Not Met: [] Adjusted   2. Patient will have a decrease in pain to facilitate improvement in movement, function, and ADLs as indicated by Functional Deficits. [] Progressing: [x] Met: [] Not Met: [] Adjusted    Long Term Goals: To be achieved in: 8 weeks  1. Disability index score of 19.5% or less for the [place] to assist with reaching prior level of function. [] Progressing: [x] Met: [] Not Met: [] Adjusted  2. Patient will demonstrate increased AROM to WNL, good LS mobility, good hip ROM to allow for proper joint functioning as indicated by patients Functional Deficits. [] Progressing: [x] Met: [] Not Met: [] Adjusted  3. Patient will demonstrate an increase in Strength to good proximal hip and core activation to allow for proper functional mobility as indicated by patients Functional Deficits. [] Progressing: [x] Met: [] Not Met: [] Adjusted  4. Patient will return to ADLs and other functional activities without increased symptoms or restriction. [] Progressing: [x] Met: [] Not Met: [] Adjusted  5. Patient will return to walking for >45 minutes without pain in lumbar spine / left gluteal region (patient specific functional goal)    [x] Progressing: [] Met: [] Not Met: [] Adjusted     Overall Progression Towards Functional goals/ Treatment Progress Update:  [x] Patient is progressing as expected towards functional goals listed. [] Progression is slowed due to complexities/Impairments listed. [] Progression has been slowed due to co-morbidities.   [] Plan just implemented, too soon to assess goals progression <30days   [] Goals require adjustment due to lack of progress  [] Patient is not progressing as expected and requires additional follow up with physician  [] Other    Prognosis for POC: [x] Good [] Fair  [] Poor    Patient requires continued skilled intervention: [x] Yes  [] No    Treatment/Activity Tolerance:  [x] Patient able to complete treatment  [] Patient limited by fatigue  [] Patient limited by pain     [] Patient limited by other medical complications  [x] Other: Patient returned to PT today for  Last follow up appointment prior to discharge. She has shown high motivation and good compliance to date with PT instructions and HEP. She has full pain-free  Lumbar flexion AROM but remains limited and painful into extension. She hinges at L4/L5 with very little extension in upper lumbar spine. With PA mobs she has stiffness in lower lumbar spine but improved mobility after manual intervention. She also continues to have high tone along lumbar paraspinals but less noted at QL this visit. After manual IASTM to paraspinals as well as lumbar PAs, she had improved lumbar extension ROM with less pain and more of an even lordotic curve. She was educated about why this is important to maintain as well as given exercises to address this at home. She has returned to all ADLs and functional movements with significantly less pain and is able to manage independently. She wishes to discharge to [facility] at this time but will follow up in the future if symptoms do not continue to improve and/or worsen. Prognosis: [] Good [x] Fair  [] Poor    Patient Requires Follow-up: [] Yes  [x] No    PLAN: See eval  [] Continue per plan of care [] Alter current plan (see comments)  [] Plan of care initiated [] Hold pending MD visit [x] Discharge    Electronically signed by: Belkys Lemons PT, DPT, OCS    *If patient does not return for further follow ups after this date. Please consider this as the patients discharge from physical therapy.

## 2021-08-18 ENCOUNTER — APPOINTMENT (RX ONLY)
Dept: URBAN - METROPOLITAN AREA CLINIC 170 | Facility: CLINIC | Age: 76
Setting detail: DERMATOLOGY
End: 2021-08-18

## 2021-08-18 DIAGNOSIS — D18.0 HEMANGIOMA: ICD-10-CM

## 2021-08-18 DIAGNOSIS — L82.1 OTHER SEBORRHEIC KERATOSIS: ICD-10-CM

## 2021-08-18 DIAGNOSIS — D22 MELANOCYTIC NEVI: ICD-10-CM

## 2021-08-18 DIAGNOSIS — Z85.828 PERSONAL HISTORY OF OTHER MALIGNANT NEOPLASM OF SKIN: ICD-10-CM

## 2021-08-18 DIAGNOSIS — L81.4 OTHER MELANIN HYPERPIGMENTATION: ICD-10-CM

## 2021-08-18 PROBLEM — D18.01 HEMANGIOMA OF SKIN AND SUBCUTANEOUS TISSUE: Status: ACTIVE | Noted: 2021-08-18

## 2021-08-18 PROBLEM — D22.5 MELANOCYTIC NEVI OF TRUNK: Status: ACTIVE | Noted: 2021-08-18

## 2021-08-18 PROCEDURE — 99213 OFFICE O/P EST LOW 20 MIN: CPT

## 2021-08-18 PROCEDURE — ? FULL BODY SKIN EXAM

## 2021-08-18 PROCEDURE — ? ADDITIONAL NOTES

## 2021-08-18 PROCEDURE — ? COUNSELING

## 2021-08-18 PROCEDURE — ? TREATMENT REGIMEN

## 2021-08-18 ASSESSMENT — LOCATION DETAILED DESCRIPTION DERM
LOCATION DETAILED: STERNAL NOTCH
LOCATION DETAILED: UPPER STERNUM
LOCATION DETAILED: MIDDLE STERNUM
LOCATION DETAILED: LEFT MEDIAL BREAST 10-11:00 REGION

## 2021-08-18 ASSESSMENT — LOCATION SIMPLE DESCRIPTION DERM
LOCATION SIMPLE: CHEST
LOCATION SIMPLE: LEFT BREAST

## 2021-08-18 ASSESSMENT — LOCATION ZONE DERM: LOCATION ZONE: TRUNK

## 2021-08-18 NOTE — PROCEDURE: MIPS QUALITY
Quality 110: Preventive Care And Screening: Influenza Immunization: Influenza Immunization Administered during Influenza season
Quality 226: Preventive Care And Screening: Tobacco Use: Screening And Cessation Intervention: Patient screened for tobacco use and is an ex/non-smoker
Quality 47: Advance Care Plan: Advance Care Planning discussed and documented; advance care plan or surrogate decision maker documented in the medical record.
Quality 111:Pneumonia Vaccination Status For Older Adults: Pneumococcal Vaccination Previously Received
Quality 130: Documentation Of Current Medications In The Medical Record: Current Medications Documented
Detail Level: Detailed
Quality 431: Preventive Care And Screening: Unhealthy Alcohol Use - Screening: Patient screened for unhealthy alcohol use using a single question and scores less than 2 times per year

## 2021-08-18 NOTE — HPI: EVALUATION OF SKIN LESION(S)
What Type Of Note Output Would You Prefer (Optional)?: Bullet Format
Hpi Title: Evaluation of Skin Lesions
Have Your Spot(S) Been Treated In The Past?: has been treated
Family Member: Mom

## 2022-09-08 ENCOUNTER — APPOINTMENT (RX ONLY)
Dept: URBAN - METROPOLITAN AREA CLINIC 170 | Facility: CLINIC | Age: 77
Setting detail: DERMATOLOGY
End: 2022-09-08

## 2022-09-08 DIAGNOSIS — L29.8 OTHER PRURITUS: ICD-10-CM | Status: INADEQUATELY CONTROLLED

## 2022-09-08 DIAGNOSIS — L82.1 OTHER SEBORRHEIC KERATOSIS: ICD-10-CM

## 2022-09-08 DIAGNOSIS — L81.4 OTHER MELANIN HYPERPIGMENTATION: ICD-10-CM

## 2022-09-08 DIAGNOSIS — D22 MELANOCYTIC NEVI: ICD-10-CM

## 2022-09-08 DIAGNOSIS — D18.0 HEMANGIOMA: ICD-10-CM

## 2022-09-08 DIAGNOSIS — L29.89 OTHER PRURITUS: ICD-10-CM | Status: INADEQUATELY CONTROLLED

## 2022-09-08 DIAGNOSIS — Z85.828 PERSONAL HISTORY OF OTHER MALIGNANT NEOPLASM OF SKIN: ICD-10-CM

## 2022-09-08 DIAGNOSIS — L57.0 ACTINIC KERATOSIS: ICD-10-CM

## 2022-09-08 PROBLEM — D22.5 MELANOCYTIC NEVI OF TRUNK: Status: ACTIVE | Noted: 2022-09-08

## 2022-09-08 PROBLEM — D18.01 HEMANGIOMA OF SKIN AND SUBCUTANEOUS TISSUE: Status: ACTIVE | Noted: 2022-09-08

## 2022-09-08 PROBLEM — D48.5 NEOPLASM OF UNCERTAIN BEHAVIOR OF SKIN: Status: ACTIVE | Noted: 2022-09-08

## 2022-09-08 PROCEDURE — 99214 OFFICE O/P EST MOD 30 MIN: CPT | Mod: 25

## 2022-09-08 PROCEDURE — ? BIOPSY BY SHAVE METHOD

## 2022-09-08 PROCEDURE — 17000 DESTRUCT PREMALG LESION: CPT | Mod: 59

## 2022-09-08 PROCEDURE — ? TREATMENT REGIMEN

## 2022-09-08 PROCEDURE — ? PRESCRIPTION

## 2022-09-08 PROCEDURE — ? COUNSELING

## 2022-09-08 PROCEDURE — ? ADDITIONAL NOTES

## 2022-09-08 PROCEDURE — ? FULL BODY SKIN EXAM

## 2022-09-08 PROCEDURE — ? LIQUID NITROGEN

## 2022-09-08 PROCEDURE — 11102 TANGNTL BX SKIN SINGLE LES: CPT

## 2022-09-08 RX ORDER — CLOBETASOL PROPIONATE 0.5 MG/G
OINTMENT TOPICAL
Qty: 30 | Refills: 1 | Status: ERX

## 2022-09-08 ASSESSMENT — LOCATION DETAILED DESCRIPTION DERM
LOCATION DETAILED: STERNAL NOTCH
LOCATION DETAILED: RIGHT PROXIMAL DORSAL FOREARM
LOCATION DETAILED: UPPER STERNUM
LOCATION DETAILED: RIGHT DORSAL WRIST
LOCATION DETAILED: LEFT MEDIAL BREAST 10-11:00 REGION
LOCATION DETAILED: RIGHT CENTRAL ZYGOMA
LOCATION DETAILED: MIDDLE STERNUM

## 2022-09-08 ASSESSMENT — LOCATION ZONE DERM
LOCATION ZONE: TRUNK
LOCATION ZONE: ARM
LOCATION ZONE: FACE

## 2022-09-08 ASSESSMENT — LOCATION SIMPLE DESCRIPTION DERM
LOCATION SIMPLE: RIGHT FOREARM
LOCATION SIMPLE: CHEST
LOCATION SIMPLE: LEFT BREAST
LOCATION SIMPLE: RIGHT WRIST
LOCATION SIMPLE: RIGHT ZYGOMA

## 2023-01-16 SDOH — HEALTH STABILITY: PHYSICAL HEALTH: ON AVERAGE, HOW MANY MINUTES DO YOU ENGAGE IN EXERCISE AT THIS LEVEL?: 30 MIN

## 2023-01-16 SDOH — HEALTH STABILITY: PHYSICAL HEALTH: ON AVERAGE, HOW MANY DAYS PER WEEK DO YOU ENGAGE IN MODERATE TO STRENUOUS EXERCISE (LIKE A BRISK WALK)?: 3 DAYS

## 2023-01-18 ENCOUNTER — OFFICE VISIT (OUTPATIENT)
Dept: ORTHOPEDIC SURGERY | Age: 78
End: 2023-01-18

## 2023-01-18 VITALS — HEIGHT: 60 IN | WEIGHT: 111 LBS | BODY MASS INDEX: 21.79 KG/M2

## 2023-01-18 DIAGNOSIS — M25.551 RIGHT HIP PAIN: Primary | ICD-10-CM

## 2023-01-20 NOTE — PROGRESS NOTES
History: This is a 72-year-old female that presents for routine follow-up after right YASEMIN in 2020 by Dr. Momo Boyd. No new issues or pain. Doing very well. Exam: Well-healed anterior incision. No tenderness or gait abnormalities. No neurovascular compromise distally. Imagin views of the right hip ordered obtained interpreted and reviewed show stable alignment of hip replacement components with no signs of complication. Assessment: 72-year-old female 3 years status post right YASEMIN. Plan: Advised things look great and she has no concern for complications of her hip replacement and can follow-up as needed.

## 2023-09-13 ENCOUNTER — APPOINTMENT (RX ONLY)
Dept: URBAN - METROPOLITAN AREA CLINIC 170 | Facility: CLINIC | Age: 78
Setting detail: DERMATOLOGY
End: 2023-09-13

## 2023-09-13 DIAGNOSIS — L81.4 OTHER MELANIN HYPERPIGMENTATION: ICD-10-CM

## 2023-09-13 DIAGNOSIS — D18.0 HEMANGIOMA: ICD-10-CM

## 2023-09-13 DIAGNOSIS — D22 MELANOCYTIC NEVI: ICD-10-CM

## 2023-09-13 DIAGNOSIS — Z85.828 PERSONAL HISTORY OF OTHER MALIGNANT NEOPLASM OF SKIN: ICD-10-CM

## 2023-09-13 DIAGNOSIS — L82.1 OTHER SEBORRHEIC KERATOSIS: ICD-10-CM

## 2023-09-13 PROBLEM — D22.5 MELANOCYTIC NEVI OF TRUNK: Status: ACTIVE | Noted: 2023-09-13

## 2023-09-13 PROBLEM — D18.01 HEMANGIOMA OF SKIN AND SUBCUTANEOUS TISSUE: Status: ACTIVE | Noted: 2023-09-13

## 2023-09-13 PROCEDURE — 99213 OFFICE O/P EST LOW 20 MIN: CPT

## 2023-09-13 PROCEDURE — ? COUNSELING

## 2023-09-13 PROCEDURE — ? FULL BODY SKIN EXAM

## 2023-09-13 PROCEDURE — ? TREATMENT REGIMEN

## 2023-09-13 ASSESSMENT — LOCATION SIMPLE DESCRIPTION DERM
LOCATION SIMPLE: LEFT BREAST
LOCATION SIMPLE: CHEST

## 2023-09-13 ASSESSMENT — LOCATION ZONE DERM: LOCATION ZONE: TRUNK

## 2023-09-13 ASSESSMENT — LOCATION DETAILED DESCRIPTION DERM
LOCATION DETAILED: UPPER STERNUM
LOCATION DETAILED: MIDDLE STERNUM
LOCATION DETAILED: LEFT MEDIAL BREAST 10-11:00 REGION
LOCATION DETAILED: STERNAL NOTCH

## 2024-09-19 ENCOUNTER — APPOINTMENT (RX ONLY)
Dept: URBAN - METROPOLITAN AREA CLINIC 170 | Facility: CLINIC | Age: 79
Setting detail: DERMATOLOGY
End: 2024-09-19

## 2024-09-19 DIAGNOSIS — D22 MELANOCYTIC NEVI: ICD-10-CM | Status: STABLE

## 2024-09-19 DIAGNOSIS — Z85.828 PERSONAL HISTORY OF OTHER MALIGNANT NEOPLASM OF SKIN: ICD-10-CM

## 2024-09-19 DIAGNOSIS — L29.8 OTHER PRURITUS: ICD-10-CM | Status: STABLE

## 2024-09-19 DIAGNOSIS — L81.4 OTHER MELANIN HYPERPIGMENTATION: ICD-10-CM | Status: STABLE

## 2024-09-19 DIAGNOSIS — D18.0 HEMANGIOMA: ICD-10-CM | Status: STABLE

## 2024-09-19 DIAGNOSIS — L82.1 OTHER SEBORRHEIC KERATOSIS: ICD-10-CM | Status: STABLE

## 2024-09-19 DIAGNOSIS — L29.89 OTHER PRURITUS: ICD-10-CM | Status: STABLE

## 2024-09-19 PROBLEM — D18.01 HEMANGIOMA OF SKIN AND SUBCUTANEOUS TISSUE: Status: ACTIVE | Noted: 2024-09-19

## 2024-09-19 PROBLEM — D22.5 MELANOCYTIC NEVI OF TRUNK: Status: ACTIVE | Noted: 2024-09-19

## 2024-09-19 PROCEDURE — ? COUNSELING

## 2024-09-19 PROCEDURE — ? PRESCRIPTION MEDICATION MANAGEMENT

## 2024-09-19 PROCEDURE — ? PRESCRIPTION

## 2024-09-19 PROCEDURE — ? FULL BODY SKIN EXAM

## 2024-09-19 PROCEDURE — ? TREATMENT REGIMEN

## 2024-09-19 PROCEDURE — 99213 OFFICE O/P EST LOW 20 MIN: CPT

## 2024-09-19 PROCEDURE — ? MEDICATION COUNSELING

## 2024-09-19 RX ORDER — TRIAMCINOLONE ACETONIDE 1 MG/G
1 OINTMENT TOPICAL BID
Qty: 30 | Refills: 5 | Status: ERX | COMMUNITY
Start: 2024-09-19

## 2024-09-19 RX ADMIN — TRIAMCINOLONE ACETONIDE 1: 1 OINTMENT TOPICAL at 00:00

## 2024-09-19 ASSESSMENT — LOCATION SIMPLE DESCRIPTION DERM
LOCATION SIMPLE: LEFT UPPER ARM
LOCATION SIMPLE: UPPER BACK
LOCATION SIMPLE: ABDOMEN
LOCATION SIMPLE: RIGHT UPPER BACK

## 2024-09-19 ASSESSMENT — LOCATION DETAILED DESCRIPTION DERM
LOCATION DETAILED: RIGHT MID-UPPER BACK
LOCATION DETAILED: RIGHT LATERAL ABDOMEN
LOCATION DETAILED: LEFT ANTECUBITAL SKIN
LOCATION DETAILED: EPIGASTRIC SKIN
LOCATION DETAILED: INFERIOR THORACIC SPINE

## 2024-09-19 ASSESSMENT — LOCATION ZONE DERM
LOCATION ZONE: TRUNK
LOCATION ZONE: ARM

## 2024-09-19 NOTE — PROCEDURE: PRESCRIPTION MEDICATION MANAGEMENT
Render In Strict Bullet Format?: No
Detail Level: Detailed
Initiate Treatment: Triamcinolone 0.1% ointment Apply to affected areas on the body 1-2 times daily for 1-2 weeks
Discontinue Regimen: Clobetasol 0.05% ointment

## 2024-09-19 NOTE — PROCEDURE: MEDICATION COUNSELING
Itraconazole Counseling:  I discussed with the patient the risks of itraconazole including but not limited to liver damage, nausea/vomiting, neuropathy, and severe allergy.  The patient understands that this medication is best absorbed when taken with acidic beverages such as non-diet cola or ginger ale.  The patient understands that monitoring is required including baseline LFTs and repeat LFTs at intervals.  The patient understands that they are to contact us or the primary physician if concerning signs are noted.
Low Dose Naltrexone Pregnancy And Lactation Text: Naltrexone is pregnancy category C.  There have been no adequate and well-controlled studies in pregnant women.  It should be used in pregnancy only if the potential benefit justifies the potential risk to the fetus.   Limited data indicates that naltrexone is minimally excreted into breastmilk.
Thalidomide Counseling: I discussed with the patient the risks of thalidomide including but not limited to birth defects, anxiety, weakness, chest pain, dizziness, cough and severe allergy.
Klisyri Counseling:  I discussed with the patient the risks of Klisyri including but not limited to erythema, scaling, itching, weeping, crusting, and pain.
Cosentyx Pregnancy And Lactation Text: This medication is Pregnancy Category B and is considered safe during pregnancy. It is unknown if this medication is excreted in breast milk.
Sarecycline Pregnancy And Lactation Text: This medication is Pregnancy Category D and not consider safe during pregnancy. It is also excreted in breast milk.
Topical Clindamycin Pregnancy And Lactation Text: This medication is Pregnancy Category B and is considered safe during pregnancy. It is unknown if it is excreted in breast milk.
Cephalexin Pregnancy And Lactation Text: This medication is Pregnancy Category B and considered safe during pregnancy.  It is also excreted in breast milk but can be used safely for shorter doses.
Odomzo Counseling- I discussed with the patient the risks of Odomzo including but not limited to nausea, vomiting, diarrhea, constipation, weight loss, changes in the sense of taste, decreased appetite, muscle spasms, and hair loss.  The patient verbalized understanding of the proper use and possible adverse effects of Odomzo.  All of the patient's questions and concerns were addressed.
Skyrizi Counseling: I discussed with the patient the risks of risankizumab-rzaa including but not limited to immunosuppression, and serious infections.  The patient understands that monitoring is required including a PPD at baseline and must alert us or the primary physician if symptoms of infection or other concerning signs are noted.
Tremfya Counseling: I discussed with the patient the risks of guselkumab including but not limited to immunosuppression, serious infections, worsening of inflammatory bowel disease and drug reactions.  The patient understands that monitoring is required including a PPD at baseline and must alert us or the primary physician if symptoms of infection or other concerning signs are noted.
Include Pregnancy/Lactation Warning?: No
Acitretin Pregnancy And Lactation Text: This medication is Pregnancy Category X and should not be given to women who are pregnant or may become pregnant in the future. This medication is excreted in breast milk.
Metronidazole Pregnancy And Lactation Text: This medication is Pregnancy Category B and considered safe during pregnancy.  It is also excreted in breast milk.
Carac Pregnancy And Lactation Text: This medication is Pregnancy Category X and contraindicated in pregnancy and in women who may become pregnant. It is unknown if this medication is excreted in breast milk.
Elidel Counseling: Patient may experience a mild burning sensation during topical application. Elidel is not approved in children less than 2 years of age. There have been case reports of hematologic and skin malignancies in patients using topical calcineurin inhibitors although causality is questionable.
Solaraze Pregnancy And Lactation Text: This medication is Pregnancy Category B and is considered safe. There is some data to suggest avoiding during the third trimester. It is unknown if this medication is excreted in breast milk.
Otezla Counseling: The side effects of Otezla were discussed with the patient, including but not limited to worsening or new depression, weight loss, diarrhea, nausea, upper respiratory tract infection, and headache. Patient instructed to call the office should any adverse effect occur.  The patient verbalized understanding of the proper use and possible adverse effects of Otezla.  All the patient's questions and concerns were addressed.
Picato Pregnancy And Lactation Text: This medication is Pregnancy Category C. It is unknown if this medication is excreted in breast milk.
Infliximab Counseling:  I discussed with the patient the risks of infliximab including but not limited to myelosuppression, immunosuppression, autoimmune hepatitis, demyelinating diseases, lymphoma, and serious infections.  The patient understands that monitoring is required including a PPD at baseline and must alert us or the primary physician if symptoms of infection or other concerning signs are noted.
Birth Control Pills Counseling: Birth Control Pill Counseling: I discussed with the patient the potential side effects of OCPs including but not limited to increased risk of stroke, heart attack, thrombophlebitis, deep venous thrombosis, hepatic adenomas, breast changes, GI upset, headaches, and depression.  The patient verbalized understanding of the proper use and possible adverse effects of OCPs. All of the patient's questions and concerns were addressed.
Doxepin Counseling:  Patient advised that the medication is sedating and not to drive a car after taking this medication. Patient informed of potential adverse effects including but not limited to dry mouth, urinary retention, and blurry vision.  The patient verbalized understanding of the proper use and possible adverse effects of doxepin.  All of the patient's questions and concerns were addressed.
Winlevi Pregnancy And Lactation Text: This medication is considered safe during pregnancy and breastfeeding.
Dapsone Pregnancy And Lactation Text: This medication is Pregnancy Category C and is not considered safe during pregnancy or breast feeding.
Cyclophosphamide Counseling:  I discussed with the patient the risks of cyclophosphamide including but not limited to hair loss, hormonal abnormalities, decreased fertility, abdominal pain, diarrhea, nausea and vomiting, bone marrow suppression and infection. The patient understands that monitoring is required while taking this medication.
Thalidomide Pregnancy And Lactation Text: This medication is Pregnancy Category X and is absolutely contraindicated during pregnancy. It is unknown if it is excreted in breast milk.
Klisyri Pregnancy And Lactation Text: It is unknown if this medication can harm a developing fetus or if it is excreted in breast milk.
Tetracycline Counseling: Patient counseled regarding possible photosensitivity and increased risk for sunburn.  Patient instructed to avoid sunlight, if possible.  When exposed to sunlight, patients should wear protective clothing, sunglasses, and sunscreen.  The patient was instructed to call the office immediately if the following severe adverse effects occur:  hearing changes, easy bruising/bleeding, severe headache, or vision changes.  The patient verbalized understanding of the proper use and possible adverse effects of tetracycline.  All of the patient's questions and concerns were addressed. Patient understands to avoid pregnancy while on therapy due to potential birth defects.
Sotyktu Counseling:  I discussed the most common side effects of Sotyktu including: common cold, sore throat, sinus infections, cold sores, canker sores, folliculitis, and acne.  I also discussed more serious side effects of Sotyktu including but not limited to: serious allergic reactions; increased risk for infections such as TB; cancers such as lymphomas; rhabdomyolysis and elevated CPK; and elevated triglycerides and liver enzymes. 
Opioid Pregnancy And Lactation Text: These medications can lead to premature delivery and should be avoided during pregnancy. These medications are also present in breast milk in small amounts.
Itraconazole Pregnancy And Lactation Text: This medication is Pregnancy Category C and it isn't know if it is safe during pregnancy. It is also excreted in breast milk.
Cibinqo Counseling: I discussed with the patient the risks of Cibinqo therapy including but not limited to common cold, nausea, headache, cold sores, increased blood CPK levels, dizziness, UTIs, fatigue, acne, and vomitting. Live vaccines should be avoided.  This medication has been linked to serious infections; higher rate of mortality; malignancy and lymphoproliferative disorders; major adverse cardiovascular events; thrombosis; thrombocytopenia and lymphopenia; lipid elevations; and retinal detachment.
Niacinamide Counseling: I recommended taking niacin or niacinamide, also know as vitamin B3, twice daily. Recent evidence suggests that taking vitamin B3 (500 mg twice daily) can reduce the risk of actinic keratoses and non-melanoma skin cancers. Side effects of vitamin B3 include flushing and headache.
Bexarotene Counseling:  I discussed with the patient the risks of bexarotene including but not limited to hair loss, dry lips/skin/eyes, liver abnormalities, hyperlipidemia, pancreatitis, depression/suicidal ideation, photosensitivity, drug rash/allergic reactions, hypothyroidism, anemia, leukopenia, infection, cataracts, and teratogenicity.  Patient understands that they will need regular blood tests to check lipid profile, liver function tests, white blood cell count, thyroid function tests and pregnancy test if applicable.
Tremfya Pregnancy And Lactation Text: The risk during pregnancy and breastfeeding is uncertain with this medication.
Dupixent Counseling: I discussed with the patient the risks of dupilumab including but not limited to eye infection and irritation, cold sores, injection site reactions, worsening of asthma, allergic reactions and increased risk of parasitic infection.  Live vaccines should be avoided while taking dupilumab. Dupilumab will also interact with certain medications such as warfarin and cyclosporine. The patient understands that monitoring is required and they must alert us or the primary physician if symptoms of infection or other concerning signs are noted.
Clindamycin Counseling: I counseled the patient regarding use of clindamycin as an antibiotic for prophylactic and/or therapeutic purposes. Clindamycin is active against numerous classes of bacteria, including skin bacteria. Side effects may include nausea, diarrhea, gastrointestinal upset, rash, hives, yeast infections, and in rare cases, colitis.
Doxepin Pregnancy And Lactation Text: This medication is Pregnancy Category C and it isn't known if it is safe during pregnancy. It is also excreted in breast milk and breast feeding isn't recommended.
Adbry Counseling: I discussed with the patient the risks of tralokinumab including but not limited to eye infection and irritation, cold sores, injection site reactions, worsening of asthma, allergic reactions and increased risk of parasitic infection.  Live vaccines should be avoided while taking tralokinumab. The patient understands that monitoring is required and they must alert us or the primary physician if symptoms of infection or other concerning signs are noted.
Otezla Pregnancy And Lactation Text: This medication is Pregnancy Category C and it isn't known if it is safe during pregnancy. It is unknown if it is excreted in breast milk.
Calcipotriene Counseling:  I discussed with the patient the risks of calcipotriene including but not limited to erythema, scaling, itching, and irritation.
Topical Ketoconazole Counseling: Patient counseled that this medication may cause skin irritation or allergic reactions.  In the event of skin irritation, the patient was advised to reduce the amount of the drug applied or use it less frequently.   The patient verbalized understanding of the proper use and possible adverse effects of ketoconazole.  All of the patient's questions and concerns were addressed.
Minocycline Counseling: Patient advised regarding possible photosensitivity and discoloration of the teeth, skin, lips, tongue and gums.  Patient instructed to avoid sunlight, if possible.  When exposed to sunlight, patients should wear protective clothing, sunglasses, and sunscreen.  The patient was instructed to call the office immediately if the following severe adverse effects occur:  hearing changes, easy bruising/bleeding, severe headache, or vision changes.  The patient verbalized understanding of the proper use and possible adverse effects of minocycline.  All of the patient's questions and concerns were addressed.
Protopic Counseling: Patient may experience a mild burning sensation during topical application. Protopic is not approved in children less than 2 years of age. There have been case reports of hematologic and skin malignancies in patients using topical calcineurin inhibitors although causality is questionable.
Gabapentin Counseling: I discussed with the patient the risks of gabapentin including but not limited to dizziness, somnolence, fatigue and ataxia.
Cyclophosphamide Pregnancy And Lactation Text: This medication is Pregnancy Category D and it isn't considered safe during pregnancy. This medication is excreted in breast milk.
Birth Control Pills Pregnancy And Lactation Text: This medication should be avoided if pregnant and for the first 30 days post-partum.
Aklief counseling:  Patient advised to apply a pea-sized amount only at bedtime and wait 30 minutes after washing their face before applying.  If too drying, patient may add a non-comedogenic moisturizer.  The most commonly reported side effects including irritation, redness, scaling, dryness, stinging, burning, itching, and increased risk of sunburn.  The patient verbalized understanding of the proper use and possible adverse effects of retinoids.  All of the patient's questions and concerns were addressed.
Soolantra Counseling: I discussed with the patients the risks of topial Soolantra. This is a medicine which decreases the number of mites and inflammation in the skin. You experience burning, stinging, eye irritation or allergic reactions.  Please call our office if you develop any problems from using this medication.
VTAMA Counseling: I discussed with the patient that VTAMA is not for use in the eyes, mouth or mouth. They should call the office if they develop any signs of allergic reactions to VTAMA. The patient verbalized understanding of the proper use and possible adverse effects of VTAMA.  All of the patient's questions and concerns were addressed.
Tranexamic Acid Counseling:  Patient advised of the small risk of bleeding problems with tranexamic acid. They were also instructed to call if they developed any nausea, vomiting or diarrhea. All of the patient's questions and concerns were addressed.
Minoxidil Counseling: Minoxidil is a topical medication which can increase blood flow where it is applied. It is uncertain how this medication increases hair growth. Side effects are uncommon and include stinging and allergic reactions.
Sotyktu Pregnancy And Lactation Text: There is insufficient data to evaluate whether or not Sotyktu is safe to use during pregnancy.   It is not known if Sotyktu passes into breast milk and whether or not it is safe to use when breastfeeding.  
Topical Metronidazole Counseling: Metronidazole is a topical antibiotic medication. You may experience burning, stinging, redness, or allergic reactions.  Please call our office if you develop any problems from using this medication.
Cibinqo Pregnancy And Lactation Text: It is unknown if this medication will adversely affect pregnancy or breast feeding.  You should not take this medication if you are currently pregnant or planning a pregnancy or while breastfeeding.
Xolair Counseling:  Patient informed of potential adverse effects including but not limited to fever, muscle aches, rash and allergic reactions.  The patient verbalized understanding of the proper use and possible adverse effects of Xolair.  All of the patient's questions and concerns were addressed.
Calcipotriene Pregnancy And Lactation Text: The use of this medication during pregnancy or lactation is not recommended as there is insufficient data.
Arava Counseling:  Patient counseled regarding adverse effects of Arava including but not limited to nausea, vomiting, abnormalities in liver function tests. Patients may develop mouth sores, rash, diarrhea, and abnormalities in blood counts. The patient understands that monitoring is required including LFTs and blood counts.  There is a rare possibility of scarring of the liver and lung problems that can occur when taking methotrexate. Persistent nausea, loss of appetite, pale stools, dark urine, cough, and shortness of breath should be reported immediately. Patient advised to discontinue Arava treatment and consult with a physician prior to attempting conception. The patient will have to undergo a treatment to eliminate Arava from the body prior to conception.
Eucrisa Counseling: Patient may experience a mild burning sensation during topical application. Eucrisa is not approved in children less than 2 years of age.
Bexarotene Pregnancy And Lactation Text: This medication is Pregnancy Category X and should not be given to women who are pregnant or may become pregnant. This medication should not be used if you are breast feeding.
Dupixent Pregnancy And Lactation Text: This medication likely crosses the placenta but the risk for the fetus is uncertain. This medication is excreted in breast milk.
Niacinamide Pregnancy And Lactation Text: These medications are considered safe during pregnancy.
Hydroxyzine Counseling: Patient advised that the medication is sedating and not to drive a car after taking this medication.  Patient informed of potential adverse effects including but not limited to dry mouth, urinary retention, and blurry vision.  The patient verbalized understanding of the proper use and possible adverse effects of hydroxyzine.  All of the patient's questions and concerns were addressed.
Vtama Pregnancy And Lactation Text: It is unknown if this medication can cause problems during pregnancy and breastfeeding.
Oxybutynin Counseling:  I discussed with the patient the risks of oxybutynin including but not limited to skin rash, drowsiness, dry mouth, difficulty urinating, and blurred vision.
Aklief Pregnancy And Lactation Text: It is unknown if this medication is safe to use during pregnancy.  It is unknown if this medication is excreted in breast milk.  Breastfeeding women should use the topical cream on the smallest area of the skin for the shortest time needed while breastfeeding.  Do not apply to nipple and areola.
Spevigo Counseling: I discussed with the patient the risks of Spevigo including but not limited to fatigue, nasuea, vomiting, headache, pruritus, urinary tract infection, an infusion related reactions.  The patient understands that monitoring is required including screening for tuberculosis at baseline and yearly screening thereafter while continuing Spevigo therapy. They should contact us if symptoms of infection or other concerning signs are noted.
Ketoconazole Counseling:   Patient counseled regarding improving absorption with orange juice.  Adverse effects include but are not limited to breast enlargement, headache, diarrhea, nausea, upset stomach, liver function test abnormalities, taste disturbance, and stomach pain.  There is a rare possibility of liver failure that can occur when taking ketoconazole. The patient understands that monitoring of LFTs may be required, especially at baseline. The patient verbalized understanding of the proper use and possible adverse effects of ketoconazole.  All of the patient's questions and concerns were addressed.
Clindamycin Pregnancy And Lactation Text: This medication can be used in pregnancy if certain situations. Clindamycin is also present in breast milk.
Adbry Pregnancy And Lactation Text: It is unknown if this medication will adversely affect pregnancy or breast feeding.
Gabapentin Pregnancy And Lactation Text: This medication is Pregnancy Category C and isn't considered safe during pregnancy. It is excreted in breast milk.
Topical Steroids Applications Pregnancy And Lactation Text: Most topical steroids are considered safe to use during pregnancy and lactation.  Any topical steroid applied to the breast or nipple should be washed off before breastfeeding.
Rituxan Counseling:  I discussed with the patient the risks of Rituxan infusions. Side effects can include infusion reactions, severe drug rashes including mucocutaneous reactions, reactivation of latent hepatitis and other infections and rarely progressive multifocal leukoencephalopathy.  All of the patient's questions and concerns were addressed.
Spironolactone Counseling: Patient advised regarding risks of diarrhea, abdominal pain, hyperkalemia, birth defects (for female patients), liver toxicity and renal toxicity. The patient may need blood work to monitor liver and kidney function and potassium levels while on therapy. The patient verbalized understanding of the proper use and possible adverse effects of spironolactone.  All of the patient's questions and concerns were addressed.
Soolantra Pregnancy And Lactation Text: This medication is Pregnancy Category C. This medication is considered safe during breast feeding.
Tranexamic Acid Pregnancy And Lactation Text: It is unknown if this medication is safe during pregnancy or breast feeding.
Minoxidil Pregnancy And Lactation Text: This medication has not been assigned a Pregnancy Risk Category but animal studies failed to show danger with the topical medication. It is unknown if the medication is excreted in breast milk.
Litfulo Counseling: I discussed with the patient the risks of Litfulo therapy including but not limited to upper respiratory tract infections, shingles, cold sores, and nausea. Live vaccines should be avoided.  This medication has been linked to serious infections; higher rate of mortality; malignancy and lymphoproliferative disorders; major adverse cardiovascular events; thrombosis; gastrointestinal perforations; neutropenia; lymphopenia; anemia; liver enzyme elevations; and lipid elevations.
Dutasteride Male Counseling: Dustasteride Counseling:  I discussed with the patient the risks of use of dutasteride including but not limited to decreased libido, decreased ejaculate volume, and gynecomastia. Women who can become pregnant should not handle medication.  All of the patient's questions and concerns were addressed.
Cyclosporine Counseling:  I discussed with the patient the risks of cyclosporine including but not limited to hypertension, gingival hyperplasia,myelosuppression, immunosuppression, liver damage, kidney damage, neurotoxicity, lymphoma, and serious infections. The patient understands that monitoring is required including baseline blood pressure, CBC, CMP, lipid panel and uric acid, and then 1-2 times monthly CMP and blood pressure.
Xeljanz Counseling: I discussed with the patient the risks of Xeljanz therapy including increased risk of infection, liver issues, headache, diarrhea, or cold symptoms. Live vaccines should be avoided. They were instructed to call if they have any problems.
Topical Metronidazole Pregnancy And Lactation Text: This medication is Pregnancy Category B and considered safe during pregnancy.  It is also considered safe to use while breastfeeding.
Protopic Pregnancy And Lactation Text: This medication is Pregnancy Category C. It is unknown if this medication is excreted in breast milk when applied topically.
Xolair Pregnancy And Lactation Text: This medication is Pregnancy Category B and is considered safe during pregnancy. This medication is excreted in breast milk.
Isotretinoin Counseling: Patient should get monthly blood tests, not donate blood, not drive at night if vision affected, not share medication, and not undergo elective surgery for 6 months after tx completed. Side effects reviewed, pt to contact office should one occur.
Quinolones Counseling:  I discussed with the patient the risks of fluoroquinolones including but not limited to GI upset, allergic reaction, drug rash, diarrhea, dizziness, photosensitivity, yeast infections, liver function test abnormalities, tendonitis/tendon rupture.
Cantharidin Counseling:  I discussed with the patient the risks of Cantharidin including but not limited to pain, redness, burning, itching, and blistering.
Enbrel Counseling:  I discussed with the patient the risks of etanercept including but not limited to myelosuppression, immunosuppression, autoimmune hepatitis, demyelinating diseases, lymphoma, and infections.  The patient understands that monitoring is required including a PPD at baseline and must alert us or the primary physician if symptoms of infection or other concerning signs are noted.
Nsaids Counseling: NSAID Counseling: I discussed with the patient that NSAIDs should be taken with food. Prolonged use of NSAIDs can result in the development of stomach ulcers.  Patient advised to stop taking NSAIDs if abdominal pain occurs.  The patient verbalized understanding of the proper use and possible adverse effects of NSAIDs.  All of the patient's questions and concerns were addressed.
Hydroxyzine Pregnancy And Lactation Text: This medication is not safe during pregnancy and should not be taken. It is also excreted in breast milk and breast feeding isn't recommended.
Zoryve Counseling:  I discussed with the patient that Zoryve is not for use in the eyes, mouth or vagina. The most commonly reported side effects include diarrhea, headache, insomnia, application site pain, upper respiratory tract infections, and urinary tract infections.  All of the patient's questions and concerns were addressed.
Glycopyrrolate Counseling:  I discussed with the patient the risks of glycopyrrolate including but not limited to skin rash, drowsiness, dry mouth, difficulty urinating, and blurred vision.
Doxycycline Counseling:  Patient counseled regarding possible photosensitivity and increased risk for sunburn.  Patient instructed to avoid sunlight, if possible.  When exposed to sunlight, patients should wear protective clothing, sunglasses, and sunscreen.  The patient was instructed to call the office immediately if the following severe adverse effects occur:  hearing changes, easy bruising/bleeding, severe headache, or vision changes.  The patient verbalized understanding of the proper use and possible adverse effects of doxycycline.  All of the patient's questions and concerns were addressed.
Azelaic Acid Counseling: Patient counseled that medicine may cause skin irritation and to avoid applying near the eyes.  In the event of skin irritation, the patient was advised to reduce the amount of the drug applied or use it less frequently.   The patient verbalized understanding of the proper use and possible adverse effects of azelaic acid.  All of the patient's questions and concerns were addressed.
Ketoconazole Pregnancy And Lactation Text: This medication is Pregnancy Category C and it isn't know if it is safe during pregnancy. It is also excreted in breast milk and breast feeding isn't recommended.
Bimzelx Counseling:  I discussed with the patient the risks of Bimzelx including but not limited to depression, immunosuppression, allergic reactions and infections.  The patient understands that monitoring is required including a PPD at baseline and must alert us or the primary physician if symptoms of infection or other concerning signs are noted.
Spevigo Pregnancy And Lactation Text: The risk during pregnancy and breastfeeding is uncertain with this medication. This medication does cross the placenta. It is unknown if this medication is found in breast milk.
Topical Sulfur Applications Counseling: Topical Sulfur Counseling: Patient counseled that this medication may cause skin irritation or allergic reactions.  In the event of skin irritation, the patient was advised to reduce the amount of the drug applied or use it less frequently.   The patient verbalized understanding of the proper use and possible adverse effects of topical sulfur application.  All of the patient's questions and concerns were addressed.
Topical Retinoid counseling:  Patient advised to apply a pea-sized amount only at bedtime and wait 30 minutes after washing their face before applying.  If too drying, patient may add a non-comedogenic moisturizer. The patient verbalized understanding of the proper use and possible adverse effects of retinoids.  All of the patient's questions and concerns were addressed.
Rituxan Pregnancy And Lactation Text: This medication is Pregnancy Category C and it isn't know if it is safe during pregnancy. It is unknown if this medication is excreted in breast milk but similar antibodies are known to be excreted.
Spironolactone Pregnancy And Lactation Text: This medication can cause feminization of the male fetus and should be avoided during pregnancy. The active metabolite is also found in breast milk.
Azithromycin Counseling:  I discussed with the patient the risks of azithromycin including but not limited to GI upset, allergic reaction, drug rash, diarrhea, and yeast infections.
Mirvaso Counseling: Mirvaso is a topical medication which can decrease superficial blood flow where applied. Side effects are uncommon and include stinging, redness and allergic reactions.
Nsaids Pregnancy And Lactation Text: These medications are considered safe up to 30 weeks gestation. It is excreted in breast milk.
Dutasteride Female Counseling: Dutasteride Counseling:  I discussed with the patient the risks of use of dutasteride including but not limited to decreased libido and sexual dysfunction. Explained the teratogenic nature of the medication and stressed the importance of not getting pregnant during treatment. All of the patient's questions and concerns were addressed.
Cyclosporine Pregnancy And Lactation Text: This medication is Pregnancy Category C and it isn't know if it is safe during pregnancy. This medication is excreted in breast milk.
Valtrex Counseling: I discussed with the patient the risks of valacyclovir including but not limited to kidney damage, nausea, vomiting and severe allergy.  The patient understands that if the infection seems to be worsening or is not improving, they are to call.
Cantharidin Pregnancy And Lactation Text: This medication has not been proven safe during pregnancy. It is unknown if this medication is excreted in breast milk.
Topical Steroids Counseling: I discussed with the patient that prolonged use of topical steroids can result in the increased appearance of superficial blood vessels (telangiectasias), lightening (hypopigmentation) and thinning of the skin (atrophy).  Patient understands to avoid using high potency steroids in skin folds, the groin or the face.  The patient verbalized understanding of the proper use and possible adverse effects of topical steroids.  All of the patient's questions and concerns were addressed.
Xeljailenez Pregnancy And Lactation Text: This medication is Pregnancy Category D and is not considered safe during pregnancy.  The risk during breast feeding is also uncertain.
Qbrexza Counseling:  I discussed with the patient the risks of Qbrexza including but not limited to headache, mydriasis, blurred vision, dry eyes, nasal dryness, dry mouth, dry throat, dry skin, urinary hesitation, and constipation.  Local skin reactions including erythema, burning, stinging, and itching can also occur.
Isotretinoin Pregnancy And Lactation Text: This medication is Pregnancy Category X and is considered extremely dangerous during pregnancy. It is unknown if it is excreted in breast milk.
Hydroquinone Counseling:  Patient advised that medication may result in skin irritation, lightening (hypopigmentation), dryness, and burning.  In the event of skin irritation, the patient was advised to reduce the amount of the drug applied or use it less frequently.  Rarely, spots that are treated with hydroquinone can become darker (pseudoochronosis).  Should this occur, patient instructed to stop medication and call the office. The patient verbalized understanding of the proper use and possible adverse effects of hydroquinone.  All of the patient's questions and concerns were addressed.
Terbinafine Counseling: Patient counseling regarding adverse effects of terbinafine including but not limited to headache, diarrhea, rash, upset stomach, liver function test abnormalities, itching, taste/smell disturbance, nausea, abdominal pain, and flatulence.  There is a rare possibility of liver failure that can occur when taking terbinafine.  The patient understands that a baseline LFT and kidney function test may be required. The patient verbalized understanding of the proper use and possible adverse effects of terbinafine.  All of the patient's questions and concerns were addressed.
Litfulo Pregnancy And Lactation Text: Based on animal studies, Lifulo may cause embryo-fetal harm when administered to pregnant women.  The medication should not be used in pregnancy.  Breastfeeding is not recommended during treatment.
Clofazimine Counseling:  I discussed with the patient the risks of clofazimine including but not limited to skin and eye pigmentation, liver damage, nausea/vomiting, gastrointestinal bleeding and allergy.
Stelara Counseling:  I discussed with the patient the risks of ustekinumab including but not limited to immunosuppression, malignancy, posterior leukoencephalopathy syndrome, and serious infections.  The patient understands that monitoring is required including a PPD at baseline and must alert us or the primary physician if symptoms of infection or other concerning signs are noted.
Azelaic Acid Pregnancy And Lactation Text: This medication is considered safe during pregnancy and breast feeding.
5-Fu Counseling: 5-Fluorouracil Counseling:  I discussed with the patient the risks of 5-fluorouracil including but not limited to erythema, scaling, itching, weeping, crusting, and pain.
Fluconazole Counseling:  Patient counseled regarding adverse effects of fluconazole including but not limited to headache, diarrhea, nausea, upset stomach, liver function test abnormalities, taste disturbance, and stomach pain.  There is a rare possibility of liver failure that can occur when taking fluconazole.  The patient understands that monitoring of LFTs and kidney function test may be required, especially at baseline. The patient verbalized understanding of the proper use and possible adverse effects of fluconazole.  All of the patient's questions and concerns were addressed.
Doxycycline Pregnancy And Lactation Text: This medication is Pregnancy Category D and not consider safe during pregnancy. It is also excreted in breast milk but is considered safe for shorter treatment courses.
Bimzelx Pregnancy And Lactation Text: This medication crosses the placenta and the safety is uncertain during pregnancy. It is unknown if this medication is present in breast milk.
Propranolol Counseling:  I discussed with the patient the risks of propranolol including but not limited to low heart rate, low blood pressure, low blood sugar, restlessness and increased cold sensitivity. They should call the office if they experience any of these side effects.
Topical Sulfur Applications Pregnancy And Lactation Text: This medication is Pregnancy Category C and has an unknown safety profile during pregnancy. It is unknown if this topical medication is excreted in breast milk.
Glycopyrrolate Pregnancy And Lactation Text: This medication is Pregnancy Category B and is considered safe during pregnancy. It is unknown if it is excreted breast milk.
Siliq Counseling:  I discussed with the patient the risks of Siliq including but not limited to new or worsening depression, suicidal thoughts and behavior, immunosuppression, malignancy, posterior leukoencephalopathy syndrome, and serious infections.  The patient understands that monitoring is required including a PPD at baseline and must alert us or the primary physician if symptoms of infection or other concerning signs are noted. There is also a special program designed to monitor depression which is required with Siliq.
Erivedge Counseling- I discussed with the patient the risks of Erivedge including but not limited to nausea, vomiting, diarrhea, constipation, weight loss, changes in the sense of taste, decreased appetite, muscle spasms, and hair loss.  The patient verbalized understanding of the proper use and possible adverse effects of Erivedge.  All of the patient's questions and concerns were addressed.
Azithromycin Pregnancy And Lactation Text: This medication is considered safe during pregnancy and is also secreted in breast milk.
Humira Counseling:  I discussed with the patient the risks of adalimumab including but not limited to myelosuppression, immunosuppression, autoimmune hepatitis, demyelinating diseases, lymphoma, and serious infections.  The patient understands that monitoring is required including a PPD at baseline and must alert us or the primary physician if symptoms of infection or other concerning signs are noted.
Albendazole Counseling:  I discussed with the patient the risks of albendazole including but not limited to cytopenia, kidney damage, nausea/vomiting and severe allergy.  The patient understands that this medication is being used in an off-label manner.
Olanzapine Counseling- I discussed with the patient the common side effects of olanzapine including but are not limited to: lack of energy, dry mouth, increased appetite, sleepiness, tremor, constipation, dizziness, changes in behavior, or restlessness.  Explained that teenagers are more likely to experience headaches, abdominal pain, pain in the arms or legs, tiredness, and sleepiness.  Serious side effects include but are not limited: increased risk of death in elderly patients who are confused, have memory loss, or dementia-related psychosis; hyperglycemia; increased cholesterol and triglycerides; and weight gain.
Dutasteride Pregnancy And Lactation Text: This medication is absolutely contraindicated in women, especially during pregnancy and breast feeding. Feminization of male fetuses is possible if taking while pregnant.
Mirvaso Pregnancy And Lactation Text: This medication has not been assigned a Pregnancy Risk Category. It is unknown if the medication is excreted in breast milk.
Qbrexza Pregnancy And Lactation Text: There is no available data on Qbrexza use in pregnant women.  There is no available data on Qbrexza use in lactation.
Valtrex Pregnancy And Lactation Text: this medication is Pregnancy Category B and is considered safe during pregnancy. This medication is not directly found in breast milk but it's metabolite acyclovir is present.
Methotrexate Counseling:  Patient counseled regarding adverse effects of methotrexate including but not limited to nausea, vomiting, abnormalities in liver function tests. Patients may develop mouth sores, rash, diarrhea, and abnormalities in blood counts. The patient understands that monitoring is required including LFT's and blood counts.  There is a rare possibility of scarring of the liver and lung problems that can occur when taking methotrexate. Persistent nausea, loss of appetite, pale stools, dark urine, cough, and shortness of breath should be reported immediately. Patient advised to discontinue methotrexate treatment at least three months before attempting to become pregnant.  I discussed the need for folate supplements while taking methotrexate.  These supplements can decrease side effects during methotrexate treatment. The patient verbalized understanding of the proper use and possible adverse effects of methotrexate.  All of the patient's questions and concerns were addressed.
Propranolol Pregnancy And Lactation Text: This medication is Pregnancy Category C and it isn't known if it is safe during pregnancy. It is excreted in breast milk.
Rifampin Counseling: I discussed with the patient the risks of rifampin including but not limited to liver damage, kidney damage, red-orange body fluids, nausea/vomiting and severe allergy.
Terbinafine Pregnancy And Lactation Text: This medication is Pregnancy Category B and is considered safe during pregnancy. It is also excreted in breast milk and breast feeding isn't recommended.
Cimzia Counseling:  I discussed with the patient the risks of Cimzia including but not limited to immunosuppression, allergic reactions and infections.  The patient understands that monitoring is required including a PPD at baseline and must alert us or the primary physician if symptoms of infection or other concerning signs are noted.
Azathioprine Counseling:  I discussed with the patient the risks of azathioprine including but not limited to myelosuppression, immunosuppression, hepatotoxicity, lymphoma, and infections.  The patient understands that monitoring is required including baseline LFTs, Creatinine, possible TPMP genotyping and weekly CBCs for the first month and then every 2 weeks thereafter.  The patient verbalized understanding of the proper use and possible adverse effects of azathioprine.  All of the patient's questions and concerns were addressed.
Olumiant Counseling: I discussed with the patient the risks of Olumiant therapy including but not limited to upper respiratory tract infections, shingles, cold sores, and nausea. Live vaccines should be avoided.  This medication has been linked to serious infections; higher rate of mortality; malignancy and lymphoproliferative disorders; major adverse cardiovascular events; thrombosis; gastrointestinal perforations; neutropenia; lymphopenia; anemia; liver enzyme elevations; and lipid elevations.
High Dose Vitamin A Counseling: Side effects reviewed, pt to contact office should one occur.
Tazorac Counseling:  Patient advised that medication is irritating and drying.  Patient may need to apply sparingly and wash off after an hour before eventually leaving it on overnight.  The patient verbalized understanding of the proper use and possible adverse effects of tazorac.  All of the patient's questions and concerns were addressed.
Erythromycin Counseling:  I discussed with the patient the risks of erythromycin including but not limited to GI upset, allergic reaction, drug rash, diarrhea, increase in liver enzymes, and yeast infections.
Benzoyl Peroxide Counseling: Patient counseled that medicine may cause skin irritation and bleach clothing.  In the event of skin irritation, the patient was advised to reduce the amount of the drug applied or use it less frequently.   The patient verbalized understanding of the proper use and possible adverse effects of benzoyl peroxide.  All of the patient's questions and concerns were addressed.
Zyclara Counseling:  I discussed with the patient the risks of imiquimod including but not limited to erythema, scaling, itching, weeping, crusting, and pain.  Patient understands that the inflammatory response to imiquimod is variable from person to person and was educated regarded proper titration schedule.  If flu-like symptoms develop, patient knows to discontinue the medication and contact us.
Hydroxychloroquine Counseling:  I discussed with the patient that a baseline ophthalmologic exam is needed at the start of therapy and every year thereafter while on therapy. A CBC may also be warranted for monitoring.  The side effects of this medication were discussed with the patient, including but not limited to agranulocytosis, aplastic anemia, seizures, rashes, retinopathy, and liver toxicity. Patient instructed to call the office should any adverse effect occur.  The patient verbalized understanding of the proper use and possible adverse effects of Plaquenil.  All the patient's questions and concerns were addressed.
Methotrexate Pregnancy And Lactation Text: This medication is Pregnancy Category X and is known to cause fetal harm. This medication is excreted in breast milk.
Wartpeel Counseling:  I discussed with the patient the risks of Wartpeel including but not limited to erythema, scaling, itching, weeping, crusting, and pain.
Rhofade Counseling: Rhofade is a topical medication which can decrease superficial blood flow where applied. Side effects are uncommon and include stinging, redness and allergic reactions.
Bactrim Counseling:  I discussed with the patient the risks of sulfa antibiotics including but not limited to GI upset, allergic reaction, drug rash, diarrhea, dizziness, photosensitivity, and yeast infections.  Rarely, more serious reactions can occur including but not limited to aplastic anemia, agranulocytosis, methemoglobinemia, blood dyscrasias, liver or kidney failure, lung infiltrates or desquamative/blistering drug rashes.
Colchicine Counseling:  Patient counseled regarding adverse effects including but not limited to stomach upset (nausea, vomiting, stomach pain, or diarrhea).  Patient instructed to limit alcohol consumption while taking this medication.  Colchicine may reduce blood counts especially with prolonged use.  The patient understands that monitoring of kidney function and blood counts may be required, especially at baseline. The patient verbalized understanding of the proper use and possible adverse effects of colchicine.  All of the patient's questions and concerns were addressed.
Albendazole Pregnancy And Lactation Text: This medication is Pregnancy Category C and it isn't known if it is safe during pregnancy. It is also excreted in breast milk.
Olanzapine Pregnancy And Lactation Text: This medication is pregnancy category C.   There are no adequate and well controlled trials with olanzapine in pregnant females.  Olanzapine should be used during pregnancy only if the potential benefit justifies the potential risk to the fetus.   In a study in lactating healthy women, olanzapine was excreted in breast milk.  It is recommended that women taking olanzapine should not breast feed.
Opzelura Counseling:  I discussed with the patient the risks of Opzelura including but not limited to nasopharngitis, bronchitis, ear infection, eosinophila, hives, diarrhea, folliculitis, tonsillitis, and rhinorrhea.  Taken orally, this medication has been linked to serious infections; higher rate of mortality; malignancy and lymphoproliferative disorders; major adverse cardiovascular events; thrombosis; thrombocytopenia, anemia, and neutropenia; and lipid elevations.
Finasteride Male Counseling: Finasteride Counseling:  I discussed with the patient the risks of use of finasteride including but not limited to decreased libido, decreased ejaculate volume, gynecomastia, and depression. Women should not handle medication.  All of the patient's questions and concerns were addressed.
Erythromycin Pregnancy And Lactation Text: This medication is Pregnancy Category B and is considered safe during pregnancy. It is also excreted in breast milk.
Benzoyl Peroxide Pregnancy And Lactation Text: This medication is Pregnancy Category C. It is unknown if benzoyl peroxide is excreted in breast milk.
Drysol Counseling:  I discussed with the patient the risks of drysol/aluminum chloride including but not limited to skin rash, itching, irritation, burning.
Cimzia Pregnancy And Lactation Text: This medication crosses the placenta but can be considered safe in certain situations. Cimzia may be excreted in breast milk.
Taltz Counseling: I discussed with the patient the risks of ixekizumab including but not limited to immunosuppression, serious infections, worsening of inflammatory bowel disease and drug reactions.  The patient understands that monitoring is required including a PPD at baseline and must alert us or the primary physician if symptoms of infection or other concerning signs are noted.
SSKI Counseling:  I discussed with the patient the risks of SSKI including but not limited to thyroid abnormalities, metallic taste, GI upset, fever, headache, acne, arthralgias, paraesthesias, lymphadenopathy, easy bleeding, arrhythmias, and allergic reaction.
Azathioprine Pregnancy And Lactation Text: This medication is Pregnancy Category D and isn't considered safe during pregnancy. It is unknown if this medication is excreted in breast milk.
High Dose Vitamin A Pregnancy And Lactation Text: High dose vitamin A therapy is contraindicated during pregnancy and breast feeding.
Imiquimod Counseling:  I discussed with the patient the risks of imiquimod including but not limited to erythema, scaling, itching, weeping, crusting, and pain.  Patient understands that the inflammatory response to imiquimod is variable from person to person and was educated regarded proper titration schedule.  If flu-like symptoms develop, patient knows to discontinue the medication and contact us.
Rifampin Pregnancy And Lactation Text: This medication is Pregnancy Category C and it isn't know if it is safe during pregnancy. It is also excreted in breast milk and should not be used if you are breast feeding.
Olumiant Pregnancy And Lactation Text: Based on animal studies, Olumiant may cause embryo-fetal harm when administered to pregnant women.  The medication should not be used in pregnancy.  Breastfeeding is not recommended during treatment.
Tazorac Pregnancy And Lactation Text: This medication is not safe during pregnancy. It is unknown if this medication is excreted in breast milk.
Libtayo Counseling- I discussed with the patient the risks of Libtayo including but not limited to nausea, vomiting, diarrhea, and bone or muscle pain.  The patient verbalized understanding of the proper use and possible adverse effects of Libtayo.  All of the patient's questions and concerns were addressed.
Bactrim Pregnancy And Lactation Text: This medication is Pregnancy Category D and is known to cause fetal risk.  It is also excreted in breast milk.
Griseofulvin Counseling:  I discussed with the patient the risks of griseofulvin including but not limited to photosensitivity, cytopenia, liver damage, nausea/vomiting and severe allergy.  The patient understands that this medication is best absorbed when taken with a fatty meal (e.g., ice cream or french fries).
Hydroxychloroquine Pregnancy And Lactation Text: This medication has been shown to cause fetal harm but it isn't assigned a Pregnancy Risk Category. There are small amounts excreted in breast milk.
Finasteride Female Counseling: Finasteride Counseling:  I discussed with the patient the risks of use of finasteride including but not limited to decreased libido and sexual dysfunction. Explained the teratogenic nature of the medication and stressed the importance of not getting pregnant during treatment. All of the patient's questions and concerns were addressed.
Simponi Counseling:  I discussed with the patient the risks of golimumab including but not limited to myelosuppression, immunosuppression, autoimmune hepatitis, demyelinating diseases, lymphoma, and serious infections.  The patient understands that monitoring is required including a PPD at baseline and must alert us or the primary physician if symptoms of infection or other concerning signs are noted.
Prednisone Counseling:  I discussed with the patient the risks of prolonged use of prednisone including but not limited to weight gain, insomnia, osteoporosis, mood changes, diabetes, susceptibility to infection, glaucoma and high blood pressure.  In cases where prednisone use is prolonged, patients should be monitored with blood pressure checks, serum glucose levels and an eye exam.  Additionally, the patient may need to be placed on GI prophylaxis, PCP prophylaxis, and calcium and vitamin D supplementation and/or a bisphosphonate.  The patient verbalized understanding of the proper use and the possible adverse effects of prednisone.  All of the patient's questions and concerns were addressed.
Cimetidine Counseling:  I discussed with the patient the risks of Cimetidine including but not limited to gynecomastia, headache, diarrhea, nausea, drowsiness, arrhythmias, pancreatitis, skin rashes, psychosis, bone marrow suppression and kidney toxicity.
Rinvoq Counseling: I discussed with the patient the risks of Rinvoq therapy including but not limited to upper respiratory tract infections, shingles, cold sores, bronchitis, nausea, cough, fever, acne, and headache. Live vaccines should be avoided.  This medication has been linked to serious infections; higher rate of mortality; malignancy and lymphoproliferative disorders; major adverse cardiovascular events; thrombosis; thrombocytopenia, anemia, and neutropenia; lipid elevations; liver enzyme elevations; and gastrointestinal perforations.
Detail Level: Zone
Opzelura Pregnancy And Lactation Text: There is insufficient data to evaluate drug-associated risk for major birth defects, miscarriage, or other adverse maternal or fetal outcomes.  There is a pregnancy registry that monitors pregnancy outcomes in pregnant persons exposed to the medication during pregnancy.  It is unknown if this medication is excreted in breast milk.  Do not breastfeed during treatment and for about 4 weeks after the last dose.
Oral Minoxidil Counseling- I discussed with the patient the risks of oral minoxidil including but not limited to shortness of breath, swelling of the feet or ankles, dizziness, lightheadedness, unwanted hair growth and allergic reaction.  The patient verbalized understanding of the proper use and possible adverse effects of oral minoxidil.  All of the patient's questions and concerns were addressed.
Ivermectin Counseling:  Patient instructed to take medication on an empty stomach with a full glass of water.  Patient informed of potential adverse effects including but not limited to nausea, diarrhea, dizziness, itching, and swelling of the extremities or lymph nodes.  The patient verbalized understanding of the proper use and possible adverse effects of ivermectin.  All of the patient's questions and concerns were addressed.
Ilumya Counseling: I discussed with the patient the risks of tildrakizumab including but not limited to immunosuppression, malignancy, posterior leukoencephalopathy syndrome, and serious infections.  The patient understands that monitoring is required including a PPD at baseline and must alert us or the primary physician if symptoms of infection or other concerning signs are noted.
Cellcept Counseling:  I discussed with the patient the risks of mycophenolate mofetil including but not limited to infection/immunosuppression, GI upset, hypokalemia, hypercholesterolemia, bone marrow suppression, lymphoproliferative disorders, malignancy, GI ulceration/bleed/perforation, colitis, interstitial lung disease, kidney failure, progressive multifocal leukoencephalopathy, and birth defects.  The patient understands that monitoring is required including a baseline creatinine and regular CBC testing. In addition, patient must alert us immediately if symptoms of infection or other concerning signs are noted.
Hyrimoz Counseling:  I discussed with the patient the risks of adalimumab including but not limited to myelosuppression, immunosuppression, autoimmune hepatitis, demyelinating diseases, lymphoma, and serious infections.  The patient understands that monitoring is required including a PPD at baseline and must alert us or the primary physician if symptoms of infection or other concerning signs are noted.
Griseofulvin Pregnancy And Lactation Text: This medication is Pregnancy Category X and is known to cause serious birth defects. It is unknown if this medication is excreted in breast milk but breast feeding should be avoided.
Cosentyx Counseling:  I discussed with the patient the risks of Cosentyx including but not limited to worsening of Crohn's disease, immunosuppression, allergic reactions and infections.  The patient understands that monitoring is required including a PPD at baseline and must alert us or the primary physician if symptoms of infection or other concerning signs are noted.
Metronidazole Counseling:  I discussed with the patient the risks of metronidazole including but not limited to seizures, nausea/vomiting, a metallic taste in the mouth, nausea/vomiting and severe allergy.
Sski Pregnancy And Lactation Text: This medication is Pregnancy Category D and isn't considered safe during pregnancy. It is excreted in breast milk.
Sarecycline Counseling: Patient advised regarding possible photosensitivity and discoloration of the teeth, skin, lips, tongue and gums.  Patient instructed to avoid sunlight, if possible.  When exposed to sunlight, patients should wear protective clothing, sunglasses, and sunscreen.  The patient was instructed to call the office immediately if the following severe adverse effects occur:  hearing changes, easy bruising/bleeding, severe headache, or vision changes.  The patient verbalized understanding of the proper use and possible adverse effects of sarecycline.  All of the patient's questions and concerns were addressed.
Topical Clindamycin Counseling: Patient counseled that this medication may cause skin irritation or allergic reactions.  In the event of skin irritation, the patient was advised to reduce the amount of the drug applied or use it less frequently.   The patient verbalized understanding of the proper use and possible adverse effects of clindamycin.  All of the patient's questions and concerns were addressed.
Low Dose Naltrexone Counseling- I discussed with the patient the potential risks and side effects of low dose naltrexone including but not limited to: more vivid dreams, headaches, nausea, vomiting, abdominal pain, fatigue, dizziness, and anxiety.
Libtayo Pregnancy And Lactation Text: This medication is contraindicated in pregnancy and when breast feeding.
Cephalexin Counseling: I counseled the patient regarding use of cephalexin as an antibiotic for prophylactic and/or therapeutic purposes. Cephalexin (commonly prescribed under brand name Keflex) is a cephalosporin antibiotic which is active against numerous classes of bacteria, including most skin bacteria. Side effects may include nausea, diarrhea, gastrointestinal upset, rash, hives, yeast infections, and in rare cases, hepatitis, kidney disease, seizures, fever, confusion, neurologic symptoms, and others. Patients with severe allergies to penicillin medications are cautioned that there is about a 10% incidence of cross-reactivity with cephalosporins. When possible, patients with penicillin allergies should use alternatives to cephalosporins for antibiotic therapy.
Carac Counseling:  I discussed with the patient the risks of Carac including but not limited to erythema, scaling, itching, weeping, crusting, and pain.
Acitretin Counseling:  I discussed with the patient the risks of acitretin including but not limited to hair loss, dry lips/skin/eyes, liver damage, hyperlipidemia, depression/suicidal ideation, photosensitivity.  Serious rare side effects can include but are not limited to pancreatitis, pseudotumor cerebri, bony changes, clot formation/stroke/heart attack.  Patient understands that alcohol is contraindicated since it can result in liver toxicity and significantly prolong the elimination of the drug by many years.
Finasteride Pregnancy And Lactation Text: This medication is absolutely contraindicated during pregnancy. It is unknown if it is excreted in breast milk.
Solaraze Counseling:  I discussed with the patient the risks of Solaraze including but not limited to erythema, scaling, itching, weeping, crusting, and pain.
Winlevi Counseling:  I discussed with the patient the risks of topical clascoterone including but not limited to erythema, scaling, itching, and stinging. Patient voiced their understanding.
Oral Minoxidil Pregnancy And Lactation Text: This medication should only be used when clearly needed if you are pregnant, attempting to become pregnant or breast feeding.
Rinvoq Pregnancy And Lactation Text: Based on animal studies, Rinvoq may cause embryo-fetal harm when administered to pregnant women.  The medication should not be used in pregnancy.  Breastfeeding is not recommended during treatment and for 6 days after the last dose.
Picato Counseling:  I discussed with the patient the risks of Picato including but not limited to erythema, scaling, itching, weeping, crusting, and pain.
Dapsone Counseling: I discussed with the patient the risks of dapsone including but not limited to hemolytic anemia, agranulocytosis, rashes, methemoglobinemia, kidney failure, peripheral neuropathy, headaches, GI upset, and liver toxicity.  Patients who start dapsone require monitoring including baseline LFTs and weekly CBCs for the first month, then every month thereafter.  The patient verbalized understanding of the proper use and possible adverse effects of dapsone.  All of the patient's questions and concerns were addressed.
Opioid Counseling: I discussed with the patient the potential side effects of opioids including but not limited to addiction, altered mental status, and depression. I stressed avoiding alcohol, benzodiazepines, muscle relaxants and sleep aids unless specifically okayed by a physician. The patient verbalized understanding of the proper use and possible adverse effects of opioids. All of the patient's questions and concerns were addressed. They were instructed to flush the remaining pills down the toilet if they did not need them for pain.

## (undated) DEVICE — SUTURE VCRL SZ 1 L18IN ABSRB UD L36MM CT-1 1/2 CIR J841D

## (undated) DEVICE — 3M™ TEGADERM™ TRANSPARENT FILM DRESSING FRAME STYLE, 1627, 4 IN X 10 IN (10 CM X 25 CM), 20/CT 4CT/CASE: Brand: 3M™ TEGADERM™

## (undated) DEVICE — 3M™ COBAN™ NL STERILE NON-LATEX SELF-ADHERENT WRAP, 2086S, 6 IN X 5 YD (15 CM X 4,5 M), 12 ROLLS/CASE: Brand: 3M™ COBAN™

## (undated) DEVICE — GOWN,PREVENTION PLUS,XLN/2XL,ST,22/CS: Brand: MEDLINE

## (undated) DEVICE — TIP APPL 20GA 4IN GEL PLT 2 CANN

## (undated) DEVICE — TURNOVER KIT RM INF CTRL TECH

## (undated) DEVICE — SUTURE VCRL + SZ 2-0 L18IN ABSRB UD CT1 L36MM 1/2 CIR VCP839D

## (undated) DEVICE — GLOVE SURG SZ 8 L12IN FNGR THK87MIL WHT LTX FREE

## (undated) DEVICE — COVER,TABLE,HEAVY DUTY,77"X90",STRL: Brand: MEDLINE

## (undated) DEVICE — SOLUTION IV 1000ML 0.9% SOD CHL

## (undated) DEVICE — COVER LT HNDL BLU PLAS

## (undated) DEVICE — SYRINGE BULB 50/CS 48/PLT: Brand: MEDEGEN MEDICAL PRODUCTS, LLC

## (undated) DEVICE — KIT PROC 2 SPRY APPL 11:1

## (undated) DEVICE — SUTURE ETHBND EXCEL SZ 5 L30IN NONABSORBABLE GRN L40MM V-37 MB66G

## (undated) DEVICE — DUAL CUT SAGITTAL BLADE

## (undated) DEVICE — SYRINGE MED 10ML TRNSLUC BRL PLUNG BLK MRK POLYPR CTRL

## (undated) DEVICE — INTENDED FOR TISSUE SEPARATION, AND OTHER PROCEDURES THAT REQUIRE A SHARP SURGICAL BLADE TO PUNCTURE OR CUT.: Brand: BARD-PARKER ® CARBON RIB-BACK BLADES

## (undated) DEVICE — ELECTRODE BLDE L6.5IN CAUT EXT DISP

## (undated) DEVICE — SUTURE FIBERWIRE SZ 5 L38IN NONABSORBABLE BLU L48MM 1/2 AR7211

## (undated) DEVICE — PACK PROCEDURE SURG TOT HIP

## (undated) DEVICE — STOCKINETTE,DOUBLE PLY,6X48,STERILE: Brand: MEDLINE

## (undated) DEVICE — SST TWIST DRILL, STANDARD, 2.4MM DIA. X 127MM: Brand: MICROAIRE®

## (undated) DEVICE — HOOD: Brand: FLYTE, SURGICOOL

## (undated) DEVICE — 3 BONE CEMENT MIXER: Brand: MIXEVAC

## (undated) DEVICE — SURGICAL SET UP - SURE SET: Brand: MEDLINE INDUSTRIES, INC.

## (undated) DEVICE — PROTECTOR UNIV FOAM EXTREMITY DEVON

## (undated) DEVICE — SURE SET-DOUBLE BASIN-LF: Brand: MEDLINE INDUSTRIES, INC.

## (undated) DEVICE — APPLICATOR PREP 26ML 0.7% IOD POVACRYLEX 74% ISO ALC ST

## (undated) DEVICE — 3M™ STERI-DRAPE™ INSTRUMENT POUCH 1018: Brand: STERI-DRAPE™

## (undated) DEVICE — 3M™ STERI-DRAPE™ U-DRAPE 1015: Brand: STERI-DRAPE™

## (undated) DEVICE — ORTHO PRE OP PACK: Brand: MEDLINE INDUSTRIES, INC.

## (undated) DEVICE — SURGICAL PROCEDURE PACK PROC SMARTPREP 2

## (undated) DEVICE — TRAY CATHETER 16FR F INCLUDE BARDX IC COMPLT CARE DRNGE BG

## (undated) DEVICE — GLOVE SURG SZ 75 L12IN FNGR THK87MIL DK GRN LTX POLYMER W

## (undated) DEVICE — SKIN AFFIX SURG ADHESIVE 72/CS 0.55ML: Brand: MEDLINE

## (undated) DEVICE — STRIP,CLOSURE,WOUND,MEDI-STRIP,1/2X4: Brand: MEDLINE

## (undated) DEVICE — DECANTER BAG 9": Brand: MEDLINE INDUSTRIES, INC.

## (undated) DEVICE — SUTURE MCRYL SZ 3-0 L27IN ABSRB UD L24MM PS-1 3/8 CIR PRIM Y936H

## (undated) DEVICE — STANDARD HYPODERMIC NEEDLE,POLYPROPYLENE HUB: Brand: MONOJECT

## (undated) DEVICE — BLADE ES L2.75IN ELASTOMERIC COAT DURABLE BEND UPTO 90DEG

## (undated) DEVICE — DRESSING THERABOND 3D ANTIMIC CNTCT SYS 15INCHX10INCH

## (undated) DEVICE — YANKAUER SUCTION INSTRUMENT WITHOUT CONTROL VENT, OPEN TIP, CLEAR: Brand: YANKAUER

## (undated) DEVICE — INTENDED TO AID IN THE PASSING OF SUTURES THROUGH BONE AND SOFT TISSUE DURING ORTHOPEDIC SURGERY: Brand: HOFFEE SUTURE RETRIEVER

## (undated) DEVICE — BLADE, TONGUE, 6", STERILE: Brand: MEDLINE

## (undated) DEVICE — ELECTRODE PT RET AD L9FT HI MOIST COND ADH HYDRGEL CORDED

## (undated) DEVICE — SUTURE PDS II SZ 1 L96IN ABSRB VLT TP-1 L65MM 1/2 CIR Z880G

## (undated) DEVICE — GARMENT,MEDLINE,DVT,INT,CALF,MED, GEN2: Brand: MEDLINE

## (undated) DEVICE — SYRINGE, LUER LOCK, 10ML: Brand: MEDLINE